# Patient Record
Sex: MALE | Race: ASIAN | NOT HISPANIC OR LATINO | URBAN - METROPOLITAN AREA
[De-identification: names, ages, dates, MRNs, and addresses within clinical notes are randomized per-mention and may not be internally consistent; named-entity substitution may affect disease eponyms.]

---

## 2018-07-05 ENCOUNTER — INPATIENT (INPATIENT)
Facility: HOSPITAL | Age: 48
LOS: 8 days | Discharge: HOME | DRG: 234 | End: 2018-07-14
Attending: THORACIC SURGERY (CARDIOTHORACIC VASCULAR SURGERY) | Admitting: HOSPITALIST
Payer: COMMERCIAL

## 2018-07-05 VITALS
SYSTOLIC BLOOD PRESSURE: 145 MMHG | OXYGEN SATURATION: 99 % | HEART RATE: 75 BPM | TEMPERATURE: 98 F | DIASTOLIC BLOOD PRESSURE: 94 MMHG | RESPIRATION RATE: 20 BRPM | HEIGHT: 64 IN | WEIGHT: 134.92 LBS

## 2018-07-05 DIAGNOSIS — E11.9 TYPE 2 DIABETES MELLITUS WITHOUT COMPLICATIONS: ICD-10-CM

## 2018-07-05 DIAGNOSIS — I20.0 UNSTABLE ANGINA: ICD-10-CM

## 2018-07-05 DIAGNOSIS — I10 ESSENTIAL (PRIMARY) HYPERTENSION: ICD-10-CM

## 2018-07-05 LAB
ALBUMIN SERPL ELPH-MCNC: 4 G/DL — SIGNIFICANT CHANGE UP (ref 3.3–5.2)
ALP SERPL-CCNC: 51 U/L — SIGNIFICANT CHANGE UP (ref 40–120)
ALT FLD-CCNC: 18 U/L — SIGNIFICANT CHANGE UP
ANION GAP SERPL CALC-SCNC: 14 MMOL/L — SIGNIFICANT CHANGE UP (ref 5–17)
APTT BLD: 27.7 SEC — SIGNIFICANT CHANGE UP (ref 27.5–37.4)
AST SERPL-CCNC: 21 U/L — SIGNIFICANT CHANGE UP
BASOPHILS # BLD AUTO: 0 K/UL — SIGNIFICANT CHANGE UP (ref 0–0.2)
BASOPHILS NFR BLD AUTO: 0.2 % — SIGNIFICANT CHANGE UP (ref 0–2)
BILIRUB SERPL-MCNC: 0.3 MG/DL — LOW (ref 0.4–2)
BUN SERPL-MCNC: 16 MG/DL — SIGNIFICANT CHANGE UP (ref 8–20)
CALCIUM SERPL-MCNC: 9.3 MG/DL — SIGNIFICANT CHANGE UP (ref 8.6–10.2)
CHLORIDE SERPL-SCNC: 102 MMOL/L — SIGNIFICANT CHANGE UP (ref 98–107)
CK MB CFR SERPL CALC: 3.9 NG/ML — SIGNIFICANT CHANGE UP (ref 0–6.7)
CK SERPL-CCNC: 422 U/L — HIGH (ref 30–200)
CO2 SERPL-SCNC: 25 MMOL/L — SIGNIFICANT CHANGE UP (ref 22–29)
CREAT SERPL-MCNC: 0.97 MG/DL — SIGNIFICANT CHANGE UP (ref 0.5–1.3)
EOSINOPHIL # BLD AUTO: 0.3 K/UL — SIGNIFICANT CHANGE UP (ref 0–0.5)
EOSINOPHIL NFR BLD AUTO: 6.2 % — HIGH (ref 0–5)
GLUCOSE SERPL-MCNC: 161 MG/DL — HIGH (ref 70–115)
HCT VFR BLD CALC: 38.2 % — LOW (ref 42–52)
HGB BLD-MCNC: 13.2 G/DL — LOW (ref 14–18)
INR BLD: 0.99 RATIO — SIGNIFICANT CHANGE UP (ref 0.88–1.16)
LYMPHOCYTES # BLD AUTO: 2.1 K/UL — SIGNIFICANT CHANGE UP (ref 1–4.8)
LYMPHOCYTES # BLD AUTO: 44.1 % — SIGNIFICANT CHANGE UP (ref 20–55)
MCHC RBC-ENTMCNC: 28.9 PG — SIGNIFICANT CHANGE UP (ref 27–31)
MCHC RBC-ENTMCNC: 34.6 G/DL — SIGNIFICANT CHANGE UP (ref 32–36)
MCV RBC AUTO: 83.6 FL — SIGNIFICANT CHANGE UP (ref 80–94)
MONOCYTES # BLD AUTO: 0.4 K/UL — SIGNIFICANT CHANGE UP (ref 0–0.8)
MONOCYTES NFR BLD AUTO: 8.5 % — SIGNIFICANT CHANGE UP (ref 3–10)
NEUTROPHILS # BLD AUTO: 2 K/UL — SIGNIFICANT CHANGE UP (ref 1.8–8)
NEUTROPHILS NFR BLD AUTO: 40.8 % — SIGNIFICANT CHANGE UP (ref 37–73)
NT-PROBNP SERPL-SCNC: 59 PG/ML — SIGNIFICANT CHANGE UP (ref 0–300)
PLATELET # BLD AUTO: 151 K/UL — SIGNIFICANT CHANGE UP (ref 150–400)
POTASSIUM SERPL-MCNC: 3.8 MMOL/L — SIGNIFICANT CHANGE UP (ref 3.5–5.3)
POTASSIUM SERPL-SCNC: 3.8 MMOL/L — SIGNIFICANT CHANGE UP (ref 3.5–5.3)
PROT SERPL-MCNC: 7 G/DL — SIGNIFICANT CHANGE UP (ref 6.6–8.7)
PROTHROM AB SERPL-ACNC: 10.9 SEC — SIGNIFICANT CHANGE UP (ref 9.8–12.7)
RBC # BLD: 4.57 M/UL — LOW (ref 4.6–6.2)
RBC # FLD: 13.5 % — SIGNIFICANT CHANGE UP (ref 11–15.6)
SODIUM SERPL-SCNC: 141 MMOL/L — SIGNIFICANT CHANGE UP (ref 135–145)
TROPONIN T SERPL-MCNC: 0.06 NG/ML — SIGNIFICANT CHANGE UP (ref 0–0.06)
WBC # BLD: 4.8 K/UL — SIGNIFICANT CHANGE UP (ref 4.8–10.8)
WBC # FLD AUTO: 4.8 K/UL — SIGNIFICANT CHANGE UP (ref 4.8–10.8)

## 2018-07-05 PROCEDURE — 71045 X-RAY EXAM CHEST 1 VIEW: CPT | Mod: 26

## 2018-07-05 PROCEDURE — 99223 1ST HOSP IP/OBS HIGH 75: CPT

## 2018-07-05 PROCEDURE — 93010 ELECTROCARDIOGRAM REPORT: CPT

## 2018-07-05 PROCEDURE — 99285 EMERGENCY DEPT VISIT HI MDM: CPT

## 2018-07-05 RX ORDER — LOSARTAN POTASSIUM 100 MG/1
50 TABLET, FILM COATED ORAL DAILY
Qty: 0 | Refills: 0 | Status: DISCONTINUED | OUTPATIENT
Start: 2018-07-05 | End: 2018-07-06

## 2018-07-05 RX ORDER — ATORVASTATIN CALCIUM 80 MG/1
40 TABLET, FILM COATED ORAL AT BEDTIME
Qty: 0 | Refills: 0 | Status: DISCONTINUED | OUTPATIENT
Start: 2018-07-05 | End: 2018-07-09

## 2018-07-05 RX ORDER — NITROGLYCERIN 6.5 MG
0.4 CAPSULE, EXTENDED RELEASE ORAL
Qty: 0 | Refills: 0 | Status: DISCONTINUED | OUTPATIENT
Start: 2018-07-05 | End: 2018-07-09

## 2018-07-05 RX ORDER — INSULIN LISPRO 100/ML
VIAL (ML) SUBCUTANEOUS
Qty: 0 | Refills: 0 | Status: DISCONTINUED | OUTPATIENT
Start: 2018-07-05 | End: 2018-07-09

## 2018-07-05 RX ORDER — DEXTROSE 50 % IN WATER 50 %
12.5 SYRINGE (ML) INTRAVENOUS ONCE
Qty: 0 | Refills: 0 | Status: DISCONTINUED | OUTPATIENT
Start: 2018-07-05 | End: 2018-07-09

## 2018-07-05 RX ORDER — GLUCAGON INJECTION, SOLUTION 0.5 MG/.1ML
1 INJECTION, SOLUTION SUBCUTANEOUS ONCE
Qty: 0 | Refills: 0 | Status: DISCONTINUED | OUTPATIENT
Start: 2018-07-05 | End: 2018-07-09

## 2018-07-05 RX ORDER — ASPIRIN/CALCIUM CARB/MAGNESIUM 324 MG
325 TABLET ORAL DAILY
Qty: 0 | Refills: 0 | Status: DISCONTINUED | OUTPATIENT
Start: 2018-07-05 | End: 2018-07-09

## 2018-07-05 RX ORDER — DEXTROSE 50 % IN WATER 50 %
25 SYRINGE (ML) INTRAVENOUS ONCE
Qty: 0 | Refills: 0 | Status: DISCONTINUED | OUTPATIENT
Start: 2018-07-05 | End: 2018-07-09

## 2018-07-05 RX ORDER — SODIUM CHLORIDE 9 MG/ML
1000 INJECTION, SOLUTION INTRAVENOUS
Qty: 0 | Refills: 0 | Status: DISCONTINUED | OUTPATIENT
Start: 2018-07-05 | End: 2018-07-09

## 2018-07-05 RX ORDER — SODIUM CHLORIDE 9 MG/ML
3 INJECTION INTRAMUSCULAR; INTRAVENOUS; SUBCUTANEOUS ONCE
Qty: 0 | Refills: 0 | Status: COMPLETED | OUTPATIENT
Start: 2018-07-05 | End: 2018-07-05

## 2018-07-05 RX ORDER — DEXTROSE 50 % IN WATER 50 %
15 SYRINGE (ML) INTRAVENOUS ONCE
Qty: 0 | Refills: 0 | Status: DISCONTINUED | OUTPATIENT
Start: 2018-07-05 | End: 2018-07-09

## 2018-07-05 RX ORDER — METOPROLOL TARTRATE 50 MG
25 TABLET ORAL
Qty: 0 | Refills: 0 | Status: DISCONTINUED | OUTPATIENT
Start: 2018-07-05 | End: 2018-07-09

## 2018-07-05 RX ORDER — SODIUM CHLORIDE 9 MG/ML
1000 INJECTION INTRAMUSCULAR; INTRAVENOUS; SUBCUTANEOUS
Qty: 0 | Refills: 0 | Status: COMPLETED | OUTPATIENT
Start: 2018-07-06 | End: 2018-07-06

## 2018-07-05 RX ADMIN — SODIUM CHLORIDE 3 MILLILITER(S): 9 INJECTION INTRAMUSCULAR; INTRAVENOUS; SUBCUTANEOUS at 21:48

## 2018-07-05 NOTE — CONSULT NOTE ADULT - PROBLEM SELECTOR RECOMMENDATION 9
Admit to monitored setting, check CBC, BMP, trend C@ x2, TFTs, A1C and FLP in am  ECG in am with CXR, O2 NC 2L  Continue ASA daily, with Statin therapy  Keep NPO after MN for Cath in am in light of acute ECG changes and ongoing chest pain,   case d/w Dr. Solis Admit to monitored setting, check CBC, BMP, trend C@ x2, TFTs, A1C and FLP in am  ECG in am with CXR, O2 NC 2L  Continue ASA daily, with Statin therapy  Keep NPO after MN for Cath in am in light of acute ECG changes and ongoing exertional chest pain,   case d/w Dr. Solis

## 2018-07-05 NOTE — ED PROVIDER NOTE - MEDICAL DECISION MAKING DETAILS
A 48 year old male pt presents to the ED c/o CP. Will check labs, EKG, cardiac enzymes, admit pt for cardiac evaluation in the am.

## 2018-07-05 NOTE — ED PROVIDER NOTE - OBJECTIVE STATEMENT
A 48 year old male pt with a hx of HTN, IDDM, on Insulin and Metformin, presents to the ED c/o CP, SOB. Over the past month the pt has been experiencing intermittent episodes of CP. Yesterday, the pt was playing "[employer] when he had a sudden onset of achy, non radiating substernal CP and SOB. The pt states that the episode lasted approx. 1 minute before dissipating. He was seen by his PMD Dr. Maxwell today where had an EKG done. Pt was told that his EKG had changes from a prior EKG 1 year ago and was told to come to [facility] to be evaluated by Dr. Caldera. He denies any current CP and has not had any associated N/V/D, recent fevers or chills. No further complaints at this time.

## 2018-07-05 NOTE — H&P ADULT - FAMILY HISTORY
Father  Still living? Unknown  Family history of cerebrovascular accident (CVA), Age at diagnosis: Age Unknown     Mother  Still living? Unknown  Family history of cerebrovascular accident (CVA), Age at diagnosis: Age Unknown

## 2018-07-05 NOTE — CONSULT NOTE ADULT - PROBLEM SELECTOR RECOMMENDATION 3
Check A1C, Insulin sliding scale, hold Metformin for cardiac procedure  Low carb diet once able to tolerate orals  Patient education provided for need of strict dietary adherence, weight goals and daily exercise, patient aware and understands

## 2018-07-05 NOTE — H&P ADULT - NSHPPHYSICALEXAM_GEN_ALL_CORE
Vital Signs   T(C): 36.6 (05 Jul 2018 18:07), Max: 36.6 (05 Jul 2018 18:07)  T(F): 97.9 (05 Jul 2018 18:07), Max: 97.9 (05 Jul 2018 18:07)  HR: 75 (05 Jul 2018 18:07) (75 - 75)  BP: 145/94 (05 Jul 2018 18:07) (145/94 - 145/94)  RR: 20 (05 Jul 2018 18:07) (20 - 20)  SpO2: 99% (05 Jul 2018 18:07) (99% - 99%)  General: Well developed. Well nourished. No acute distress  HEENT: PERRLA. EOMI. Clear conjunctivae. Moist mucus membrane  Neck: Supple. No JVD. No Thyromegaly   Chest: CTA bilaterally - no wheezing, rales or rhonchi. No chest wall tenderness.  Heart: Normal S1 & S2 with RRR. No murmur.   Abdomen: Soft. Non-tender. Non-distended. + BS  Ext: No pedal edema. No calf tenderness   Neuro: AAO x 3. No focal deficit. CN II-XII grossly WNL.  No speech disorder.  Skin: Warm and Dry  Psychiatry: Normal mood and affect

## 2018-07-05 NOTE — H&P ADULT - ASSESSMENT
48 years old male with PMH of DM, HTN and HLD comes with Chest Pain. As per patient, he has intermittent retrosternal chest pain for a month. It is mostly on exertion - dull and achy, non radiating, relieves with rest and is associated with shortness of breath and dizziness. Today, he went to his PMD for routine follow up and was found to have new EKG changes so he was sent to ER for further evaluation.  Denies fever, cough, palpitations, nausea, vomiting, diaphoresis, sick contacts or recent travel.    1) Chest Pain rule out ACS  - Cardiac Enzymes and ECHO  - HbA1c 7.2 on 7/3/18  - Lipid Profile (T. Chol 157; HDL 32; LDL 83 and ) on 7/3/18  - Aspirin 325 mg daily  - Lipitor 40 mg at bedtime  - Metoprolol 25 mg twice a day  - Continue Losartan 50 mg daily  - Cardiology Consult appreciated. NPO after midnight for cath in am.   2) DM  - HbA1c 7.2 on 7/3/18  - Accu checks, RISS and Lantus  3) HTN  - Losartan 50 mg daily  - Metoprolol 25 mg twice daily  4) HLD  - Lipid Profile (T. Chol 157; HDL 32; LDL 83 and ) on 7/3/18  - Lipitor 40 mg at bedtime  5) Anemia  - Outpatient work up  DVT Prophylaxis -- Lovenox 40 mg

## 2018-07-05 NOTE — H&P ADULT - NSHPLABSRESULTS_GEN_ALL_CORE
LABS:                        13.2   4.8   )-----------( 151      ( 05 Jul 2018 21:48 )             38.2     07-05    141  |  102  |  16.0  ----------------------------<  161<H>  3.8   |  25.0  |  0.97    Ca    9.3      05 Jul 2018 21:48    TPro  7.0  /  Alb  4.0  /  TBili  0.3<L>  /  DBili  x   /  AST  21  /  ALT  18  /  AlkPhos  51  07-05    PT/INR - ( 05 Jul 2018 21:48 )   PT: 10.9 sec;   INR: 0.99 ratio         PTT - ( 05 Jul 2018 21:48 )  PTT:27.7 sec  CARDIAC MARKERS ( 05 Jul 2018 21:48 )  x     / 0.06 ng/mL / 422 U/L / x     / 3.9 ng/mL    EKG: NSR at 74 bpm. Left axis deviation. TWI in lateral leads.

## 2018-07-05 NOTE — ED PROVIDER NOTE - PROGRESS NOTE DETAILS
Case d/w Cardio/Dr. Solis and Cardio PA will eval pt in ED and pt will most likely have cardiac cath tomorrow.  Case d/w Hospitalist/Dr. Augustine and will admit to Tele

## 2018-07-05 NOTE — CONSULT NOTE ADULT - SUBJECTIVE AND OBJECTIVE BOX
[facility]                                                        Office: [address] [address]                                                       Telephone: [phone]. Fax:[phone]                                                              CARDIOLOGY CONSULTATION NOTE                                                                                                   Chief complaint:  Male who presents with a chief complaint of chest pain     HPI: Patient is a 47 yo M sent in from PCPs office after experiencing intermittent left sided chest pain and CABALLERO.  About a month ago patient noticed that he was having chest pain "like heaviness in the middle" while doing physical activity.  Describes pain as 7/10 in intensity, dull and tightness is experienced without radiation.  For the past month he noticed that his symptoms are becaming worse; and yesterday while playing an outdoor game he again developed chest pressure along with palpitations and had to stop the activity due to the severity of his symptoms.  Went to see his PCP who performed an ECG and found that patient developed new changes (inverted Ts) from last year.  Patient sent in for further evaluation and management.  Currently, sitting up in bed and denies fever, chills, CP, palpitations, N/V, diaphoresis, leg edema or weakness. Family h/o CAD, CVA and early death.            REVIEW OF SYMPTOMS:   General: No distress presently  Cardiovascular: + chest pain, + dyspnea,  + palpitations, denies dizziness, orthopnea, Paroxsymal nocturnal dyspnea  Respiratory: + dyspnea, denies fever, cough, wheezing or PNA     Gastrointestinal: denies nausea, vomiting, diarrhea, abdominal pain  Neurological: denies headache, dizziness, or slurred speech;  Psychiatric: No agitation, no anxiety.  ALL OTHER REVIEW OF SYSTEMS ARE NEGATIVE.    ALLERGIES: No Known Allergies    HOME MEDICATIONS:  Insulin Lantus 16 Units SQ QHS  Hyzaar 50-12.5mg oral daily  Metformin 750 mg oral BID  Pravachol 20 mg oral QHS  ASA 81 mg oral daily     PAST MEDICAL HISTORY  DM T2  Essential HTN    PAST SURGICAL HISTORY      FAMILY HISTORY: Dad  at age 55--> CVA   Mom 75 alive -->CVA      SOCIAL HISTORY:  Denies smoking/alcohol/drugs    Vital Signs Last 24 Hrs  T(C): 36.6 (2018 18:07), Max: 36.6 (2018 18:07)  T(F): 97.9 (2018 18:07), Max: 97.9 (2018 18:07)  HR: 75 (2018 18:07) (75 - 75)  BP: 145/94 (2018 18:07) (145/94 - 145/94)  BP(mean): --  RR: 20 (2018 18:07) (20 - 20)  SpO2: 99% (2018 18:07) (99% - 99%)      PHYSICAL EXAM:  Constitutional: Comfortable and no acute distress   HEENT: Atraumatic, neck supple no JVD and no carotid bruit   CNS: A&Ox3, No focal deficits, PERRLA and EOMI Cranial nerves II-IX are intact   Respiratory: CTA b/l no wheezing noted  Cardiovascular: S1S2 RRR No murmur/rubs   Gastrointestinal: Soft non-tender and non distended with +Bowel sounds  Extremities: No edema noted  Psychiatric: Calm and no agitation  Skin: No skin rash/ulcers visualized to face, hands or feet.    LABS:                        13.2   4.8   )-----------( 151      ( 2018 21:48 )             38.2     141  |  102  |  16.0  ----------------------------<  161<H>  3.8   |  25.0  |  0.97    Ca    9.3      2018 21:48    TPro  7.0  /  Alb  4.0  /  TBili  0.3<L>  /  DBili  x   /  AST  21  /  ALT  18  /  AlkPhos  51  07-05    CARDIAC MARKERS ( 2018 21:48 )  x     / 0.06 ng/mL / 422 U/L / x     / x        ;p-BNP=Serum Pro-Brain Natriuretic Peptide: 59 pg/mL ( @ 21:48)    ECG: NSR @ 74 bpm with inverted T's in V4-V6, I, aVL (new)      RADIOLOGY & ADDITIONAL STUDIES:   X-ray:  Clear       ECHO FINDINGS: Date:                : LVEF=          ; RV function:       ; Valvular abnormalities: No significant valvular abnormality.  Mitral valve:           ;  Aortic valve:              ;Tricuspid valve:         ; Pulmonary pressures:        mm Hg. Pericardium:      STRESS  FINDINGS: Date:            ;      CATHETERIZATION FINDINGS:  Date:              :  LAD:                       ;  LCx:                        ; RCA:                ;

## 2018-07-05 NOTE — ED ADULT TRIAGE NOTE - CHIEF COMPLAINT QUOTE
Patient sent to the ED from urgent care, patient has been having intermittent CP and SOB with exertion x 1 month.

## 2018-07-06 ENCOUNTER — TRANSCRIPTION ENCOUNTER (OUTPATIENT)
Age: 48
End: 2018-07-06

## 2018-07-06 DIAGNOSIS — I25.118 ATHEROSCLEROTIC HEART DISEASE OF NATIVE CORONARY ARTERY WITH OTHER FORMS OF ANGINA PECTORIS: ICD-10-CM

## 2018-07-06 DIAGNOSIS — I24.9 ACUTE ISCHEMIC HEART DISEASE, UNSPECIFIED: ICD-10-CM

## 2018-07-06 LAB
CK MB CFR SERPL CALC: 3.1 NG/ML — SIGNIFICANT CHANGE UP (ref 0–6.7)
CK SERPL-CCNC: 212 U/L — HIGH (ref 30–200)
GLUCOSE BLDC GLUCOMTR-MCNC: 143 MG/DL — HIGH (ref 70–99)
GLUCOSE BLDC GLUCOMTR-MCNC: 166 MG/DL — HIGH (ref 70–99)
GLUCOSE BLDC GLUCOMTR-MCNC: 170 MG/DL — HIGH (ref 70–99)
TROPONIN T SERPL-MCNC: 0.05 NG/ML — SIGNIFICANT CHANGE UP (ref 0–0.06)
TSH SERPL-MCNC: 1.65 UIU/ML — SIGNIFICANT CHANGE UP (ref 0.27–4.2)

## 2018-07-06 PROCEDURE — 99152 MOD SED SAME PHYS/QHP 5/>YRS: CPT

## 2018-07-06 PROCEDURE — 99223 1ST HOSP IP/OBS HIGH 75: CPT

## 2018-07-06 PROCEDURE — 99232 SBSQ HOSP IP/OBS MODERATE 35: CPT

## 2018-07-06 PROCEDURE — 93458 L HRT ARTERY/VENTRICLE ANGIO: CPT | Mod: 26

## 2018-07-06 PROCEDURE — 99233 SBSQ HOSP IP/OBS HIGH 50: CPT

## 2018-07-06 RX ORDER — SODIUM CHLORIDE 9 MG/ML
1000 INJECTION INTRAMUSCULAR; INTRAVENOUS; SUBCUTANEOUS
Qty: 0 | Refills: 0 | Status: DISCONTINUED | OUTPATIENT
Start: 2018-07-06 | End: 2018-07-06

## 2018-07-06 RX ORDER — CHLORHEXIDINE GLUCONATE 213 G/1000ML
15 SOLUTION TOPICAL
Qty: 0 | Refills: 0 | Status: DISCONTINUED | OUTPATIENT
Start: 2018-07-06 | End: 2018-07-09

## 2018-07-06 RX ORDER — ENOXAPARIN SODIUM 100 MG/ML
40 INJECTION SUBCUTANEOUS DAILY
Qty: 0 | Refills: 0 | Status: DISCONTINUED | OUTPATIENT
Start: 2018-07-06 | End: 2018-07-09

## 2018-07-06 RX ORDER — PANTOPRAZOLE SODIUM 20 MG/1
40 TABLET, DELAYED RELEASE ORAL
Qty: 0 | Refills: 0 | Status: DISCONTINUED | OUTPATIENT
Start: 2018-07-06 | End: 2018-07-09

## 2018-07-06 RX ORDER — CHLORHEXIDINE GLUCONATE 213 G/1000ML
1 SOLUTION TOPICAL
Qty: 0 | Refills: 0 | Status: DISCONTINUED | OUTPATIENT
Start: 2018-07-07 | End: 2018-07-09

## 2018-07-06 RX ORDER — SODIUM CHLORIDE 9 MG/ML
200 INJECTION INTRAMUSCULAR; INTRAVENOUS; SUBCUTANEOUS
Qty: 0 | Refills: 0 | Status: DISCONTINUED | OUTPATIENT
Start: 2018-07-06 | End: 2018-07-09

## 2018-07-06 RX ORDER — INSULIN GLARGINE 100 [IU]/ML
16 INJECTION, SOLUTION SUBCUTANEOUS AT BEDTIME
Qty: 0 | Refills: 0 | Status: DISCONTINUED | OUTPATIENT
Start: 2018-07-06 | End: 2018-07-08

## 2018-07-06 RX ORDER — HYDRALAZINE HCL 50 MG
25 TABLET ORAL
Qty: 0 | Refills: 0 | Status: DISCONTINUED | OUTPATIENT
Start: 2018-07-06 | End: 2018-07-09

## 2018-07-06 RX ORDER — POTASSIUM CHLORIDE 20 MEQ
40 PACKET (EA) ORAL ONCE
Qty: 0 | Refills: 0 | Status: COMPLETED | OUTPATIENT
Start: 2018-07-06 | End: 2018-07-06

## 2018-07-06 RX ADMIN — Medication 325 MILLIGRAM(S): at 01:24

## 2018-07-06 RX ADMIN — Medication 25 MILLIGRAM(S): at 05:28

## 2018-07-06 RX ADMIN — ENOXAPARIN SODIUM 40 MILLIGRAM(S): 100 INJECTION SUBCUTANEOUS at 21:49

## 2018-07-06 RX ADMIN — Medication 1: at 21:48

## 2018-07-06 RX ADMIN — INSULIN GLARGINE 16 UNIT(S): 100 INJECTION, SOLUTION SUBCUTANEOUS at 21:49

## 2018-07-06 RX ADMIN — Medication 40 MILLIEQUIVALENT(S): at 16:13

## 2018-07-06 RX ADMIN — Medication 25 MILLIGRAM(S): at 18:06

## 2018-07-06 RX ADMIN — SODIUM CHLORIDE 50 MILLILITER(S): 9 INJECTION INTRAMUSCULAR; INTRAVENOUS; SUBCUTANEOUS at 17:24

## 2018-07-06 RX ADMIN — LOSARTAN POTASSIUM 50 MILLIGRAM(S): 100 TABLET, FILM COATED ORAL at 05:28

## 2018-07-06 RX ADMIN — ATORVASTATIN CALCIUM 40 MILLIGRAM(S): 80 TABLET, FILM COATED ORAL at 21:48

## 2018-07-06 RX ADMIN — Medication 325 MILLIGRAM(S): at 12:20

## 2018-07-06 RX ADMIN — SODIUM CHLORIDE 125 MILLILITER(S): 9 INJECTION INTRAMUSCULAR; INTRAVENOUS; SUBCUTANEOUS at 12:22

## 2018-07-06 NOTE — DISCHARGE NOTE ADULT - CARE PLAN
Principal Discharge DX:	Coronary artery disease of native artery of native heart with stable angina pectoris  Goal:	rest and recover  Assessment and plan of treatment:	Please come to ED or Call Cardio thoracic office at [phone] if Chest pain, Shortness of Breath,  Nausea & vomiting, oozing from wounds, 2 lb increase in weight in 24 hours.  No Ointments creams lotions to wounds  Shower daily, no bathing swimming in a pool or the ocean until instructed by MD.  We advise that you do not drive until instructed by MD. You may not return to work until instructed by MD. Please eat a low fat low salt diet. Weigh yourself daily and record it in the weight log in your red folder.

## 2018-07-06 NOTE — DISCHARGE NOTE ADULT - CARE PROVIDERS DIRECT ADDRESSES
[email or web address]

## 2018-07-06 NOTE — DISCHARGE NOTE ADULT - PLAN OF CARE
Please come to ED or Call Cardio thoracic office at [phone] if Chest pain, Shortness of Breath,  Nausea & vomiting, oozing from wounds, 2 lb increase in weight in 24 hours.  No Ointments creams lotions to wounds  Shower daily, no bathing swimming in a pool or the ocean until instructed by MD.  We advise that you do not drive until instructed by MD. You may not return to work until instructed by MD. Please eat a low fat low salt diet. Weigh yourself daily and record it in the weight log in your red folder. rest and recover

## 2018-07-06 NOTE — DISCHARGE NOTE ADULT - PATIENT PORTAL LINK FT
You can access the [facility] Patient Portal, offered by [facility], by registering with the following website: [email or web address]

## 2018-07-06 NOTE — DISCHARGE NOTE ADULT - MEDICATION SUMMARY - MEDICATIONS TO STOP TAKING
I will STOP taking the medications listed below when I get home from the hospital:    Hyzaar 50 mg-12.5 mg oral tablet  -- 1 tab(s) by mouth once a day

## 2018-07-06 NOTE — PROGRESS NOTE ADULT - SUBJECTIVE AND OBJECTIVE BOX
s/p LHC RRA  Severe and diffuse obstructive CAD  Centricity pending  Dr. Solis to discuss options with patient  Patient tolerated procedure well w/o complications          REVIEW OF SYSTEMS:  Denies SOB, CP, NV, HA, dizziness, palpitations, site pain    PHYSICAL EXAM: A&Ox3 NAD Skin warm and dry  NEURO: Speech intact +gag +swallow Tongue midline CRUZ  NECK: No JVD, trachea midline. Eupneic  HEART: RRR S12 no gm Tele w/o ectopy  PULMONARY:  CTA merlene  ABDOMEN: Soft nontender X4 +BS Vdg/eating  EXTREMITIES: Rt Radial site: Rt radial pulse + w/pulse ox on right index finger SaO2>95% RUE w/oneurovascular deficit. Capillary refill <3 sec

## 2018-07-06 NOTE — DISCHARGE NOTE ADULT - NS AS ACTIVITY OBS
Do not make important decisions/Do not drive or operate machinery/Showering allowed/No Heavy lifting/straining Walking-Outdoors allowed/Stairs allowed/Do not make important decisions/Do not drive or operate machinery/Showering allowed/Walking-Indoors allowed/No Heavy lifting/straining

## 2018-07-06 NOTE — CONSULT NOTE ADULT - ATTENDING COMMENTS
Above H& P reviewed and independently verified. Multivessel diffuse CAD. He has DM  and HT.    Extensively discussed the need for CABG and all questions answered. He will reach out to me if he has further questions.
Pt is seen and examined. Records reviewed.  Agre with H&P.  d/w Dr. Solis.  [facility] today.

## 2018-07-06 NOTE — DISCHARGE NOTE ADULT - ADDITIONAL INSTRUCTIONS
Follow up appointment with Dr Muhammad  Cardiac surgery office second floor at [facility]   Please follow up with your Cardiologist and Primary care Doctor 4 weeks from discharge Follow up appointment with Dr Muhammad *** Please call office Monday at [phone]  to arrange  Cardiac surgery office second floor at [facility]   Please follow up with your Cardiologist and Primary care Doctor 4 weeks from discharge

## 2018-07-06 NOTE — DISCHARGE NOTE ADULT - MEDICATION SUMMARY - MEDICATIONS TO TAKE
I will START or STAY ON the medications listed below when I get home from the hospital:    aspirin 325 mg oral delayed release tablet  -- 1 tab(s) by mouth once a day  -- Indication: For Aspirin    oxyCODONE-acetaminophen 5 mg-325 mg oral tablet  -- 1 tab(s) by mouth every 4 hours, As needed, Moderate Pain (4 - 6) MDD:4 tablets  -- Indication: For Pain    metFORMIN 1000 mg oral tablet  -- 1 tab(s) by mouth 2 times a day  -- Indication: For Dm    Lantus 100 units/mL subcutaneous solution  -- 20 unit(s) subcutaneous once a day (at bedtime)   -- Indication: For Dm    Pravachol 20 mg oral tablet  -- 1 tab(s) by mouth once a day  -- Indication: For Statin    clopidogrel 75 mg oral tablet  -- 1 tab(s) by mouth once a day  -- Indication: For Antiplatelet    atenolol 50 mg oral tablet  -- 1 tab(s) by mouth 2 times a day  -- Indication: For betablocker    ferrous sulfate 325 mg (65 mg elemental iron) oral tablet  -- orally 2 times a day  -- Indication: For Supplement  OTC    docusate sodium 100 mg oral capsule  -- 1 cap(s) by mouth 3 times a day  -- Indication: For OTC stool softener    polyethylene glycol 3350 oral powder for reconstitution  -- 17 gram(s) by mouth once a day  -- Indication: For OTC stool softener    senna oral tablet  -- 2 tab(s) by mouth once a day (at bedtime)  -- Indication: For OTC    pantoprazole 40 mg oral delayed release tablet  -- 1 tab(s) by mouth once a day  -- Indication: For GERD    ascorbic acid 500 mg oral tablet  -- 1 tab(s) by mouth 3 times a day  -- Indication: For Supplement    folic acid 0.8 mg oral tablet  -- 1 tab(s) by mouth once a day  -- Indication: For OTC supplement

## 2018-07-06 NOTE — DISCHARGE NOTE ADULT - MEDICATION SUMMARY - MEDICATIONS TO CHANGE
I will SWITCH the dose or number of times a day I take the medications listed below when I get home from the hospital:    Lantus 100 units/mL subcutaneous solution  -- 16 unit(s) subcutaneous once a day (at bedtime)    metFORMIN 750 mg oral tablet, extended release  -- 1 tab(s) by mouth 2 times a day    aspirin 81 mg oral tablet  -- 1 tab(s) by mouth once a day

## 2018-07-06 NOTE — DISCHARGE NOTE ADULT - HOSPITAL COURSE
s/p LHC RRA 7/9 C4L (om, diag, lad, rpda)  7/5 ER c/o exertional CP/dizziness x 1 month. 7/6 cath> Diffuse, severe obstructive CAD Patient is a 48 year old male with significant past medical history of DMII A1C 7.8%, HTN, HLD,   Inpatient History:  7/9/18- Four vessel CABG with Dr. Muhammad (SVG-OM, SVG-Diag, SVG- RPDA, LIMA-LAD), extubated without issue, HD stable, lactate down trending

## 2018-07-06 NOTE — CONSULT NOTE ADULT - SUBJECTIVE AND OBJECTIVE BOX
Surgeon: Jb    Consult requesting by: Irma    HISTORY OF PRESENT ILLNESS:  48y Male with PMH DM (On insulin), HTN,  and HLD.  Presented to the ER yesterday from PCP office c/o exertional CP and dizziness x 1 month.  Underwent cardiac cath today which revealed severe and diffuse obstructive CAD (offical report pending).  Currently sitting on the stretcher.  Wife and children at bedside.  Denies SOB or CP.  NAD noted.     PAST MEDICAL & SURGICAL HISTORY:  HLD (hyperlipidemia)  HTN (hypertension)  IDDM (insulin dependent diabetes mellitus)  No significant past surgical history      MEDICATIONS  (STANDING):  aspirin 325 milliGRAM(s) Oral daily  atorvastatin 40 milliGRAM(s) Oral at bedtime  dextrose 5%. 1000 milliLiter(s) (50 mL/Hr) IV Continuous <Continuous>  dextrose 50% Injectable 12.5 Gram(s) IV Push once  dextrose 50% Injectable 25 Gram(s) IV Push once  dextrose 50% Injectable 25 Gram(s) IV Push once  enoxaparin Injectable 40 milliGRAM(s) SubCutaneous daily  hydrALAZINE 25 milliGRAM(s) Oral two times a day  insulin glargine Injectable (LANTUS) 16 Unit(s) SubCutaneous at bedtime  insulin lispro (HumaLOG) corrective regimen sliding scale   SubCutaneous Before meals and at bedtime  metoprolol tartrate 25 milliGRAM(s) Oral two times a day  pantoprazole    Tablet 40 milliGRAM(s) Oral before breakfast  sodium chloride 0.9%. 200 milliLiter(s) (50 mL/Hr) IV Continuous <Continuous>    MEDICATIONS  (PRN):  dextrose 40% Gel 15 Gram(s) Oral once PRN Blood Glucose LESS THAN 70 milliGRAM(s)/deciLiter  glucagon  Injectable 1 milliGRAM(s) IntraMuscular once PRN Glucose <70 milliGRAM(s)/deciLiter  nitroglycerin     SubLingual 0.4 milliGRAM(s) SubLingual every 5 minutes PRN Chest Pain    Antiplatelet therapy:   ASA 81mg last dose today.                        Last dose/amt:    Allergies    No Known Allergies    Intolerances        SOCIAL HISTORY:  Smoker: [ ] Yes  [x ] No        PACK YEARS:                         WHEN QUIT?  ETOH use: [ ] Yes  [ x] No              FREQUENCY / QUANTITY:  Ilicit Drug use:  [ ] Yes  [ x] No  Occupation:  for a company.  Live with: Wife and children  Assisted device use: denies use    FAMILY HISTORY:  Family history of cerebrovascular accident (CVA) (Father, Mother)      Review of Systems  CONSTITUTIONAL:  Fevers[ ] chills[ ] sweats[ ] fatigue[ ] weight loss[ ] weight gain [ ]                                     NEGATIVE [x ]   NEURO:  parathesias[ ] seizures [ ]  syncope [ ]  confusion [ ]        dizziness with the CP episodes                        NEGATIVE[ ]   EYES: glasses[ ]  blurry vision[ ]  discharge[ ] pain[ ] glaucoma [ ]                                                                          NEGATIVE[ x]   ENMT:  difficulty hearing [ ]  vertigo[ ]  dysphagia[ ] epistaxis[ ] recent dental work [ ]                                    NEGATIVE[x ]   CV:  chest pain[x ] palpitations[ ] CABALLERO [ ] diaphoresis [ ]                                                                                           NEGATIVE[ ]   RESPIRATORY:  wheezing[ ] SOB[ ] cough [ ] sputum[ ] hemoptysis[ ]                                                                  NEGATIVE[x ]   GI:  nausea[ ]  vommiting [ ]  diarrhea[ ] constipation [ ] melena [ ]                                                                      NEGATIVE[x ]   : hematuria[ ]  dysuria[ ] urgency[ ] incontinence[ ]                                                                                            NEGATIVE[x ]   MUSKULOSKELETAL:  arthritis[ ]  joint swelling [ ] muscle weakness [ ]                                                                NEGATIVE[x ]   SKIN/BREAST:  rash[ ] itching [ ]  hair loss[ ] masses[ ]                                                                                              NEGATIVE[x ]   PSYCH:  dementia [ ] depresion [ ] anxiety[ ]                                                                                                               NEGATIVE[x ]   HEME/LYMPH:  bruises easily[ ] enlarged lymph nodes[ ] tender lymph nodes[ ]                                               NEGATIVE[x ]   ENDOCRINE:  cold intolerance[ ] heat intolerance[ ] polydipsia[ ]                                                                          NEGATIVE[x ]     PHYSICAL EXAM  Vital Signs Last 24 Hrs  T(C): 36.4 (06 Jul 2018 15:35), Max: 36.7 (06 Jul 2018 04:16)  T(F): 97.6 (06 Jul 2018 15:35), Max: 98.1 (06 Jul 2018 04:16)  HR: 77 (06 Jul 2018 19:00) (66 - 88)  BP: 159/80 (06 Jul 2018 19:00) (114/72 - 159/99)  BP(mean): --  RR: 18 (06 Jul 2018 19:00) (10 - 19)  SpO2: 100% (06 Jul 2018 19:00) (96% - 100%) on room air at rest.    CONSTITUTIONAL:                                                                          WNL[ x]   Neuro: WNL[x ] Normal exam oriented to person/place & time with no focal motor or sensory  deficits. Other                     Eyes: WNL[ x]   Normal exam of conjunctiva & lids, pupils equally reactive. Other     ENT: WNL[x ]    Normal exam of nasal/oral mucosa with absence of cyanosis. Other  Neck: WNL[x ]  Normal exam of jugular veins, trachea & thyroid. Other  Chest: WNL[x ] Normal lung exam with good air movement absence of wheezes, rales, or rhonchi: Other                                                                                CV:  Auscultation: normal [x ] S3[ ] S4[ ] Irregular [ ] Rub[ ] Clicks[ ]    Murmurs none:[x ]systolic [ ]  diastolic [ ] holosystolic [ ]  Carotids: No Bruits[x ] Other                 Abdominal Aorta: normal [x ] nonpalpable[ ]Other                                                                                      GI:           WNL[x ] Normal exam of abdomen, liver & spleen with no noted masses or tenderness. Other                                                                                                        Extremities: WNL[x ] Normal no evidence of cyanosis or deformity Edema: none[ ]trace[ ]1+[ ]2+[ ]3+[ ]4+[ ]  Lower Extremity Pulses: Right[ pp] Left[pp ]Varicosities[ -]  SKIN :WNL[ x] Normal exam to inspection & palpation. Other:                                                          LABS:                        13.2   4.8   )-----------( 151      ( 05 Jul 2018 21:48 )             38.2     07-05    141  |  102  |  16.0  ----------------------------<  161<H>  3.8   |  25.0  |  0.97    Ca    9.3      05 Jul 2018 21:48    TPro  7.0  /  Alb  4.0  /  TBili  0.3<L>  /  DBili  x   /  AST  21  /  ALT  18  /  AlkPhos  51  07-05    PT/INR - ( 05 Jul 2018 21:48 )   PT: 10.9 sec;   INR: 0.99 ratio         PTT - ( 05 Jul 2018 21:48 )  PTT:27.7 sec    CARDIAC MARKERS ( 06 Jul 2018 08:15 )  x     / 0.05 ng/mL / 212 U/L / x     / 3.1 ng/mL  CARDIAC MARKERS ( 05 Jul 2018 21:48 )  x     / 0.06 ng/mL / 422 U/L / x     / 3.9 ng/mL          Cardiac Cath: Awaiting official report    TTE / NITISH: pending.

## 2018-07-06 NOTE — PROGRESS NOTE ADULT - SUBJECTIVE AND OBJECTIVE BOX
Patient: SIMRAN VELASCO [id number] 48y Male                           Internal Medicine Hospitalist Progress Note  Chief Complaint: Patient is a 48y old  Male who presents with a chief complaint of Chest Pain (05 Jul 2018 23:34)    HPI:  48 years old male with PMH of DM, HTN and HLD comes with Chest Pain. As per patient, he has intermittent retrosternal chest pain for a month. It is mostly on exertion - dull and achy, non radiating, relieves with rest and is associated with shortness of breath and dizziness. Today, he went to his PMD for routine follow up and was found to have new EKG changes so he was sent to ER for further evaluation.  Denies fever, cough, palpitations, nausea, vomiting, diaphoresis, sick contacts or recent travel. (05 Jul 2018 23:34)    Interim History:   Pt seen with wife at bedside.  Chest pain resolved.  No sob.  No additional complaints.     ____________________PHYSICAL EXAM:  Vitals reviewed as indicated below  GENERAL:  NAD Alert and Oriented x 3   HEENT: NCAT  CARDIOVASCULAR:  S1, S2  LUNGS: CTAB  ABDOMEN:  soft, (-) tenderness, (-) distension, (+) bowel sounds, (-) guarding, (-) rebound (-) rigidity  EXTREMITIES:  no cyanosis / clubbing / edema.   ____________________      BACKGROUND:  HEALTH ISSUES - PROBLEM Dx:  Diabetes mellitus type 2, insulin dependent: Diabetes mellitus type 2, insulin dependent  Essential hypertension: Essential hypertension  Unstable angina pectoris: Unstable angina pectoris        Allergies    No Known Allergies    Intolerances      PAST MEDICAL & SURGICAL HISTORY:  HLD (hyperlipidemia)  HTN (hypertension)  IDDM (insulin dependent diabetes mellitus)  No significant past surgical history        VITALS:  Vital Signs Last 24 Hrs  T(C): 36.7 (06 Jul 2018 11:27), Max: 36.7 (06 Jul 2018 04:16)  T(F): 98.1 (06 Jul 2018 11:27), Max: 98.1 (06 Jul 2018 04:16)  HR: 66 (06 Jul 2018 11:27) (66 - 77)  BP: 159/99 (06 Jul 2018 11:27) (132/86 - 159/99)  BP(mean): --  RR: 16 (06 Jul 2018 11:27) (16 - 20)  SpO2: 99% (06 Jul 2018 11:27) (98% - 99%) Daily Height in cm: 162.56 (05 Jul 2018 18:07)    Daily   CAPILLARY BLOOD GLUCOSE      POCT Blood Glucose.: 143 mg/dL (06 Jul 2018 08:13)    I&O's Summary        LABS:                        13.2   4.8   )-----------( 151      ( 05 Jul 2018 21:48 )             38.2     07-05    141  |  102  |  16.0  ----------------------------<  161<H>  3.8   |  25.0  |  0.97    Ca    9.3      05 Jul 2018 21:48    TPro  7.0  /  Alb  4.0  /  TBili  0.3<L>  /  DBili  x   /  AST  21  /  ALT  18  /  AlkPhos  51  07-05    PT/INR - ( 05 Jul 2018 21:48 )   PT: 10.9 sec;   INR: 0.99 ratio         PTT - ( 05 Jul 2018 21:48 )  PTT:27.7 sec  LIVER FUNCTIONS - ( 05 Jul 2018 21:48 )  Alb: 4.0 g/dL / Pro: 7.0 g/dL / ALK PHOS: 51 U/L / ALT: 18 U/L / AST: 21 U/L / GGT: x             CARDIAC MARKERS ( 06 Jul 2018 08:15 )  x     / 0.05 ng/mL / 212 U/L / x     / 3.1 ng/mL  CARDIAC MARKERS ( 05 Jul 2018 21:48 )  x     / 0.06 ng/mL / 422 U/L / x     / 3.9 ng/mL          MEDICATIONS:  MEDICATIONS  (STANDING):  aspirin 325 milliGRAM(s) Oral daily  atorvastatin 40 milliGRAM(s) Oral at bedtime  dextrose 5%. 1000 milliLiter(s) (50 mL/Hr) IV Continuous <Continuous>  dextrose 50% Injectable 12.5 Gram(s) IV Push once  dextrose 50% Injectable 25 Gram(s) IV Push once  dextrose 50% Injectable 25 Gram(s) IV Push once  enoxaparin Injectable 40 milliGRAM(s) SubCutaneous daily  insulin glargine Injectable (LANTUS) 16 Unit(s) SubCutaneous at bedtime  insulin lispro (HumaLOG) corrective regimen sliding scale   SubCutaneous Before meals and at bedtime  losartan 50 milliGRAM(s) Oral daily  metoprolol tartrate 25 milliGRAM(s) Oral two times a day    MEDICATIONS  (PRN):  dextrose 40% Gel 15 Gram(s) Oral once PRN Blood Glucose LESS THAN 70 milliGRAM(s)/deciLiter  glucagon  Injectable 1 milliGRAM(s) IntraMuscular once PRN Glucose <70 milliGRAM(s)/deciLiter  nitroglycerin     SubLingual 0.4 milliGRAM(s) SubLingual every 5 minutes PRN Chest Pain

## 2018-07-06 NOTE — DISCHARGE NOTE ADULT - INSTRUCTIONS
Restricted use with no heavy lifting of affected arm for 48 hours.  No submerging the arm in water for 48 hours.  You may start showering today.  Call your doctor for any bleeding, swelling, loss of sensation in the hand or fingers, or fingers turning blue.  If heavy bleeding or large lumps form, hold pressure at the spot and come to the Emergency Room. A registered dietician can help you create a personalized plan for healthy eating. Make your calories count by choosin. Healthy carbohydrates such as fruits, vegetables, whole grains, legumes (beans, peas and lentils) and low-fat dairy products.  2. Fiber-rich foods such as vegetables, fruits, nuts, legumes, whole-wheat flour and wheat bran.  3. Heart-healthy fish at least twice a week such as cod, tuna and halibut, which are low-fat options as well as salmon, mackerel, tuna, sardines and bluefish, which are rich in omega-3 fatty acids. Avoid fish with high levels of mercury.  4. "Good" fats such as avocados, almonds, pecans, walnuts, olives and canola, olive and peanut oils.     Minimize foods with high saturated fats (beef, hot dogs, sausage and schaefer), trans fats (processed snacks, baked goods, shortening and stick margarines), high cholesterol foods (high fat dairy products and animal proteins, fried food) and high sodium foods (fast food, many frozen foods, processed food). NO CONCENTRATED SWEETS  LOW CARBOHYDRATES  1. Healthy carbohydrates such as fruits, vegetables, whole grains, legumes (beans, peas and lentils) and low-fat dairy products.  2. Fiber-rich foods such as vegetables, fruits, nuts, legumes, whole-wheat flour and wheat bran.  3. Heart-healthy fish at least twice a week such as cod, tuna and halibut, which are low-fat options as well as salmon, mackerel, tuna, sardines and bluefish, which are rich in omega-3 fatty acids. Avoid fish with high levels of mercury.  4. "Good" fats such as avocados, almonds, pecans, walnuts, olives and canola, olive and peanut oils.     Minimize foods with high saturated fats (beef, hot dogs, sausage and schaefer), trans fats (processed snacks, baked goods, shortening and stick margarines), high cholesterol foods (high fat dairy products and animal proteins, fried food) and high sodium foods (fast food, many frozen foods, processed food).

## 2018-07-06 NOTE — ED ADULT NURSE NOTE - OBJECTIVE STATEMENT
Pt states "Noelle been having chest pains for a few weeks, noelle been trying to do yard work and play [employer] but I got short of breath and my chest pains came back", pt denies n/v, denies dizziness/weakness, hx of DM, no recent injury/trauma to chest, went to PMD and they sent him to ED for possible cath

## 2018-07-06 NOTE — DISCHARGE NOTE ADULT - CARE PROVIDER_API CALL
Manny Martinez), Cardiovascular Disease  [address] [place] [address]  Phone: [phone]  Fax: [phone] Manny Martinez), Cardiovascular Disease  [address] [facility]  [address]  [place] [address]  Phone: [phone]  Fax: [phone]    Dusty Muhammad), [facility]  [address] [place] [address]  Phone: [phone]  Fax: [phone]

## 2018-07-06 NOTE — CONSULT NOTE ADULT - PROBLEM SELECTOR RECOMMENDATION 9
Preop workup including labs, TTE, carotid duplex, and PFT's.  Maintain on medicine service for now.  Plan for possible surgery Monday or Tuesday.  Discussed with Dr. Muhammad.    Further plans to follow.

## 2018-07-06 NOTE — PROGRESS NOTE ADULT - SUBJECTIVE AND OBJECTIVE BOX
CARDIOLOGY PROGRESS NOTE   ([facility])                                                                                                        Subjective:     laying in bed  Denied dyspnea, CP, palpitation  Denied SOB.  NAD    Vitals:  T(C): 36.7 (07-06-18 @ 04:16), Max: 36.7 (07-06-18 @ 04:16)  HR: 77 (07-06-18 @ 04:16) (75 - 77)  BP: 132/86 (07-06-18 @ 04:16) (132/86 - 145/94)  RR: 18 (07-06-18 @ 04:16) (18 - 20)  SpO2: 98% (07-06-18 @ 04:16) (98% - 99%)  Wt(kg): --  I&O's Summary    Height (cm): 162.56 (07-05 @ 18:07)  Weight (kg): 61.2 (07-05 @ 18:07)  BMI (kg/m2): 23.2 (07-05 @ 18:07)  BSA (m2): 1.65 (07-05 @ 18:07)    PHYSICAL EXAM:  Appearance: Normal	  HEENT:   Atraumatic  Cardiovascular: Normal S1 S2, No JVD, No murmurs, No edema  Respiratory: Lungs clear to auscultation	  Gastrointestinal:  Soft, Non-tender, + BS	  Skin: No rashes, No ecchymoses, No cyanosis  Neurologic: Alert and awake, able to move extremities  Extremities: No edema    TELEMETRY: 	      CURRENT MEDICATIONS:  losartan 50 milliGRAM(s) Oral daily  metoprolol tartrate 25 milliGRAM(s) Oral two times a day  nitroglycerin     SubLingual 0.4 milliGRAM(s) SubLingual every 5 minutes PRN        aspirin 325 milliGRAM(s) Oral daily      atorvastatin 40 milliGRAM(s) Oral at bedtime  dextrose 40% Gel 15 Gram(s) Oral once PRN  dextrose 50% Injectable 12.5 Gram(s) IV Push once  dextrose 50% Injectable 25 Gram(s) IV Push once  dextrose 50% Injectable 25 Gram(s) IV Push once  glucagon  Injectable 1 milliGRAM(s) IntraMuscular once PRN  insulin glargine Injectable (LANTUS) 16 Unit(s) SubCutaneous at bedtime  insulin lispro (HumaLOG) corrective regimen sliding scale   SubCutaneous Before meals and at bedtime    dextrose 5%. 1000 milliLiter(s) IV Continuous <Continuous>  enoxaparin Injectable 40 milliGRAM(s) SubCutaneous daily  sodium chloride 0.9%. 1000 milliLiter(s) IV Continuous <Continuous>      LABS:	 	                            13.2   4.8   )-----------( 151      ( 05 Jul 2018 21:48 )             38.2     07-05    141  |  102  |  16.0  ----------------------------<  161<H>  3.8   |  25.0  |  0.97    Ca    9.3      05 Jul 2018 21:48    TPro  7.0  /  Alb  4.0  /  TBili  0.3<L>  /  DBili  x   /  AST  21  /  ALT  18  /  AlkPhos  51  07-05    proBNP: Serum Pro-Brain Natriuretic Peptide: 59 pg/mL (07-05 @ 21:48)

## 2018-07-06 NOTE — PROGRESS NOTE ADULT - PROBLEM SELECTOR PLAN 1
DC ARB in the event of CTS  Add PPI/hydralazine  RRA site care w/instructions to pt  ECHO pending  Dr. Solis to review findings/options with patient  FU outpt [place] [facility] Dr. Dickerson

## 2018-07-06 NOTE — DISCHARGE NOTE ADULT - OTHER SIGNIFICANT FINDINGS
Patient assessment, examination, discharge planning, coordination of care, patient and family education and counseling took me 45 minutes to complete. Patient assessment, examination, discharge planning, coordination of care, patient and family education and counseling took me 45 minutes to complete. c

## 2018-07-06 NOTE — ED ADULT NURSE REASSESSMENT NOTE - NS ED NURSE REASSESS COMMENT FT1
Francoise 195, 1 unit of humalog given, pt NPO starting at midnight and aware of plan of care, resting comfortably in stretcher, awaiting room assignment.
Pt resting comfortably in stretcher, resp even and unlabored, pt aware of plan of care, continues to show NSR on monitor, offers no complaints at this time, awaiting room assignment, will continue to monitor.

## 2018-07-07 LAB
ALBUMIN SERPL ELPH-MCNC: 3.9 G/DL — SIGNIFICANT CHANGE UP (ref 3.3–5.2)
ALP SERPL-CCNC: 43 U/L — SIGNIFICANT CHANGE UP (ref 40–120)
ALT FLD-CCNC: 19 U/L — SIGNIFICANT CHANGE UP
ANION GAP SERPL CALC-SCNC: 11 MMOL/L — SIGNIFICANT CHANGE UP (ref 5–17)
APPEARANCE UR: CLEAR — SIGNIFICANT CHANGE UP
APTT BLD: 33.3 SEC — SIGNIFICANT CHANGE UP (ref 27.5–37.4)
AST SERPL-CCNC: 19 U/L — SIGNIFICANT CHANGE UP
BACTERIA # UR AUTO: ABNORMAL
BILIRUB SERPL-MCNC: 0.4 MG/DL — SIGNIFICANT CHANGE UP (ref 0.4–2)
BILIRUB UR-MCNC: NEGATIVE — SIGNIFICANT CHANGE UP
BUN SERPL-MCNC: 16 MG/DL — SIGNIFICANT CHANGE UP (ref 8–20)
CALCIUM SERPL-MCNC: 8.7 MG/DL — SIGNIFICANT CHANGE UP (ref 8.6–10.2)
CHLORIDE SERPL-SCNC: 104 MMOL/L — SIGNIFICANT CHANGE UP (ref 98–107)
CO2 SERPL-SCNC: 26 MMOL/L — SIGNIFICANT CHANGE UP (ref 22–29)
COLOR SPEC: YELLOW — SIGNIFICANT CHANGE UP
CREAT SERPL-MCNC: 0.89 MG/DL — SIGNIFICANT CHANGE UP (ref 0.5–1.3)
DIFF PNL FLD: NEGATIVE — SIGNIFICANT CHANGE UP
EOSINOPHIL # BLD AUTO: 0.3 K/UL — SIGNIFICANT CHANGE UP (ref 0–0.5)
EOSINOPHIL NFR BLD AUTO: 4.9 % — SIGNIFICANT CHANGE UP (ref 0–5)
EPI CELLS # UR: SIGNIFICANT CHANGE UP
GLUCOSE BLDC GLUCOMTR-MCNC: 117 MG/DL — HIGH (ref 70–99)
GLUCOSE BLDC GLUCOMTR-MCNC: 157 MG/DL — HIGH (ref 70–99)
GLUCOSE BLDC GLUCOMTR-MCNC: 189 MG/DL — HIGH (ref 70–99)
GLUCOSE BLDC GLUCOMTR-MCNC: 88 MG/DL — SIGNIFICANT CHANGE UP (ref 70–99)
GLUCOSE SERPL-MCNC: 111 MG/DL — SIGNIFICANT CHANGE UP (ref 70–115)
GLUCOSE UR QL: NEGATIVE MG/DL — SIGNIFICANT CHANGE UP
HBA1C BLD-MCNC: 7.4 % — HIGH (ref 4–5.6)
HCT VFR BLD CALC: 40.2 % — LOW (ref 42–52)
HGB BLD-MCNC: 13.5 G/DL — LOW (ref 14–18)
INR BLD: 1.04 RATIO — SIGNIFICANT CHANGE UP (ref 0.88–1.16)
KETONES UR-MCNC: NEGATIVE — SIGNIFICANT CHANGE UP
LEUKOCYTE ESTERASE UR-ACNC: NEGATIVE — SIGNIFICANT CHANGE UP
LYMPHOCYTES # BLD AUTO: 2.1 K/UL — SIGNIFICANT CHANGE UP (ref 1–4.8)
LYMPHOCYTES # BLD AUTO: 34.9 % — SIGNIFICANT CHANGE UP (ref 20–55)
MAGNESIUM SERPL-MCNC: 2 MG/DL — SIGNIFICANT CHANGE UP (ref 1.6–2.6)
MCHC RBC-ENTMCNC: 28.1 PG — SIGNIFICANT CHANGE UP (ref 27–31)
MCHC RBC-ENTMCNC: 33.6 G/DL — SIGNIFICANT CHANGE UP (ref 32–36)
MCV RBC AUTO: 83.6 FL — SIGNIFICANT CHANGE UP (ref 80–94)
MONOCYTES # BLD AUTO: 0.4 K/UL — SIGNIFICANT CHANGE UP (ref 0–0.8)
MONOCYTES NFR BLD AUTO: 5.9 % — SIGNIFICANT CHANGE UP (ref 3–10)
MRSA PCR RESULT.: SIGNIFICANT CHANGE UP
NEUTROPHILS # BLD AUTO: 3.2 K/UL — SIGNIFICANT CHANGE UP (ref 1.8–8)
NEUTROPHILS NFR BLD AUTO: 54.1 % — SIGNIFICANT CHANGE UP (ref 37–73)
NITRITE UR-MCNC: NEGATIVE — SIGNIFICANT CHANGE UP
NT-PROBNP SERPL-SCNC: 103 PG/ML — SIGNIFICANT CHANGE UP (ref 0–300)
PH UR: 6 — SIGNIFICANT CHANGE UP (ref 5–8)
PHOSPHATE SERPL-MCNC: 3.3 MG/DL — SIGNIFICANT CHANGE UP (ref 2.4–4.7)
PLATELET # BLD AUTO: 157 K/UL — SIGNIFICANT CHANGE UP (ref 150–400)
POTASSIUM SERPL-MCNC: 4 MMOL/L — SIGNIFICANT CHANGE UP (ref 3.5–5.3)
POTASSIUM SERPL-SCNC: 4 MMOL/L — SIGNIFICANT CHANGE UP (ref 3.5–5.3)
PREALB SERPL-MCNC: 24 MG/DL — SIGNIFICANT CHANGE UP (ref 18–38)
PROT SERPL-MCNC: 6.8 G/DL — SIGNIFICANT CHANGE UP (ref 6.6–8.7)
PROT UR-MCNC: NEGATIVE MG/DL — SIGNIFICANT CHANGE UP
PROTHROM AB SERPL-ACNC: 11.5 SEC — SIGNIFICANT CHANGE UP (ref 9.8–12.7)
RBC # BLD: 4.81 M/UL — SIGNIFICANT CHANGE UP (ref 4.6–6.2)
RBC # FLD: 13.4 % — SIGNIFICANT CHANGE UP (ref 11–15.6)
RBC CASTS # UR COMP ASSIST: SIGNIFICANT CHANGE UP /HPF (ref 0–4)
S AUREUS DNA NOSE QL NAA+PROBE: SIGNIFICANT CHANGE UP
SODIUM SERPL-SCNC: 141 MMOL/L — SIGNIFICANT CHANGE UP (ref 135–145)
SP GR SPEC: 1.01 — SIGNIFICANT CHANGE UP (ref 1.01–1.02)
UROBILINOGEN FLD QL: NEGATIVE MG/DL — SIGNIFICANT CHANGE UP
WBC # BLD: 5.9 K/UL — SIGNIFICANT CHANGE UP (ref 4.8–10.8)
WBC # FLD AUTO: 5.9 K/UL — SIGNIFICANT CHANGE UP (ref 4.8–10.8)
WBC UR QL: SIGNIFICANT CHANGE UP

## 2018-07-07 PROCEDURE — 99232 SBSQ HOSP IP/OBS MODERATE 35: CPT

## 2018-07-07 PROCEDURE — 93306 TTE W/DOPPLER COMPLETE: CPT | Mod: 26

## 2018-07-07 PROCEDURE — 93880 EXTRACRANIAL BILAT STUDY: CPT | Mod: 26

## 2018-07-07 RX ADMIN — CHLORHEXIDINE GLUCONATE 1 APPLICATION(S): 213 SOLUTION TOPICAL at 21:27

## 2018-07-07 RX ADMIN — CHLORHEXIDINE GLUCONATE 15 MILLILITER(S): 213 SOLUTION TOPICAL at 05:58

## 2018-07-07 RX ADMIN — Medication 1: at 12:35

## 2018-07-07 RX ADMIN — CHLORHEXIDINE GLUCONATE 15 MILLILITER(S): 213 SOLUTION TOPICAL at 17:55

## 2018-07-07 RX ADMIN — Medication 1: at 22:27

## 2018-07-07 RX ADMIN — ATORVASTATIN CALCIUM 40 MILLIGRAM(S): 80 TABLET, FILM COATED ORAL at 22:26

## 2018-07-07 RX ADMIN — PANTOPRAZOLE SODIUM 40 MILLIGRAM(S): 20 TABLET, DELAYED RELEASE ORAL at 05:58

## 2018-07-07 RX ADMIN — INSULIN GLARGINE 16 UNIT(S): 100 INJECTION, SOLUTION SUBCUTANEOUS at 22:26

## 2018-07-07 RX ADMIN — Medication 325 MILLIGRAM(S): at 17:55

## 2018-07-07 RX ADMIN — ENOXAPARIN SODIUM 40 MILLIGRAM(S): 100 INJECTION SUBCUTANEOUS at 22:26

## 2018-07-07 RX ADMIN — Medication 25 MILLIGRAM(S): at 05:58

## 2018-07-07 RX ADMIN — Medication 25 MILLIGRAM(S): at 17:55

## 2018-07-07 RX ADMIN — CHLORHEXIDINE GLUCONATE 1 APPLICATION(S): 213 SOLUTION TOPICAL at 09:21

## 2018-07-07 NOTE — PROGRESS NOTE ADULT - SUBJECTIVE AND OBJECTIVE BOX
Post cath check  Feels fine  aware he needs cabg  no further cp sob or palp  Cm sinus sharon  Labs reviewed    b/p 124/70  Hr 56  Lungs clear  Heart s1&s2 bradycardic  Abd soft non tender  Ext no edema right radial site benign  Neuro alert and oriented    49y/o male with DM, Htn, Hld presents with cp, neg trop  cathed and found to have multi vessel cad    CAD  seen by CV team  CAbg planned early next week  Echo, carotids pending  continue asa, metoprolol and lipitor  Discussed plan of care with wife    DM  metformin d/haylie  sliding scale coverage [facility]                                                                          [facility]                                                                                                   [address], [place]  [address]                                                                                           T [phone]  F [phone]      Patient seen follow up on cath    Feels fine  aware he needs cabg        ROS:  CV:  No cp, sob or palpitations  Resp:  No cough, wheeze, mucus production  GI:  no abd pain, nausea, melana  :  no hematuria no dysuria  SKIN: no rashes, hematomas          b/p 124/70  Hr 56  Lungs clear  Heart s1&s2 bradycardic  Abd soft non tender  Ext no edema right radial site benign  Neuro alert and oriented      aspirin 325 milliGRAM(s) Oral daily  atorvastatin 40 milliGRAM(s) Oral at bedtime  chlorhexidine 0.12% Liquid 15 milliLiter(s) Swish and Spit two times a day  chlorhexidine 4% Liquid 1 Application(s) Topical two times a day  enoxaparin Injectable 40 milliGRAM(s) SubCutaneous daily  glucagon  Injectable 1 milliGRAM(s) IntraMuscular once PRN  hydrALAZINE 25 milliGRAM(s) Oral two times a day  insulin glargine Injectable (LANTUS) 16 Unit(s) SubCutaneous at bedtime  insulin lispro (HumaLOG) corrective regimen sliding scale   SubCutaneous Before meals and at bedtime  metoprolol tartrate 25 milliGRAM(s) Oral two times a day  nitroglycerin     SubLingual 0.4 milliGRAM(s) SubLingual every 5 minutes PRN  pantoprazole    Tablet 40 milliGRAM(s) Oral before breakfast  sodium chloride 0.9%. 200 milliLiter(s) IV Continuous <Continuous>    I&O's Summary    2018 07:01  -  2018 07:00  --------------------------------------------------------  IN: 240 mL / OUT: 0 mL / NET: 240 mL      Daily Height in cm: 160.02 (2018 05:55)    Daily Weight in k.6 (2018 04:21)    LABS    CBC Full  -  ( 2018 06:20 )  WBC Count : 5.9 K/uL  Hemoglobin : 13.5 g/dL  Hematocrit : 40.2 %  Platelet Count - Automated : 157 K/uL  Mean Cell Volume : 83.6 fl  Mean Cell Hemoglobin : 28.1 pg  Mean Cell Hemoglobin Concentration : 33.6 g/dL  Auto Neutrophil # : 3.2 K/uL  Auto Lymphocyte # : 2.1 K/uL  Auto Monocyte # : 0.4 K/uL  Auto Eosinophil # : 0.3 K/uL  Auto Basophil # : x  Auto Neutrophil % : 54.1 %  Auto Lymphocyte % : 34.9 %  Auto Monocyte % : 5.9 %  Auto Eosinophil % : 4.9 %  Auto Basophil % : x    07-    141  |  104  |  16.0  ----------------------------<  111  4.0   |  26.0  |  0.89    Ca    8.7      2018 06:20  Phos  3.3     07-  Mg     2.0     07-    TPro  6.8  /  Alb  3.9  /  TBili  0.4  /  DBili  x   /  AST  19  /  ALT  19  /  AlkPhos  43  07-07    CARDIAC MARKERS ( 2018 08:15 )  x     / 0.05 ng/mL / 212 U/L / x     / 3.1 ng/mL  CARDIAC MARKERS ( 2018 21:48 )  x     / 0.06 ng/mL / 422 U/L / x     / 3.9 ng/mL        TELEMETRY:  Ekg:  Echo:  Cath:    PE Exam  NECK supple no jvd  Lungs clear to ausculatation  Heart s1&s2   Abd soft active bowel sounds non tender  Ext No c/c/e  right radial site benign  Neuro alert and oriented no focal findings    A/P  49y/o male with DM, Htn, Hld presents with cp, neg trop  cathed and found to have multi vessel cad    CAD  seen by CV team  CAbg planned early next week  Echo, carotids pending  continue asa, metoprolol and lipitor  Discussed plan of care with wife    DM  metformin d/haylie  sliding scale coverage [facility]                                                                          [facility]                                                                                                   [address], [place]  [address]                                                                                           T [phone]  F [phone]      Patient seen follow up on cath    Feels fine  aware he needs cabg        ROS:  CV:  No cp, sob or palpitations  Resp:  No cough, wheeze, mucus production  GI:  no abd pain, nausea, melana  :  no hematuria no dysuria  SKIN: no rashes, hematomas          b/p 124/70  Hr 56  Lungs clear  Heart s1&s2 bradycardic  Abd soft non tender  Ext no edema right radial site benign  Neuro alert and oriented      aspirin 325 milliGRAM(s) Oral daily  atorvastatin 40 milliGRAM(s) Oral at bedtime  chlorhexidine 0.12% Liquid 15 milliLiter(s) Swish and Spit two times a day  chlorhexidine 4% Liquid 1 Application(s) Topical two times a day  enoxaparin Injectable 40 milliGRAM(s) SubCutaneous daily  glucagon  Injectable 1 milliGRAM(s) IntraMuscular once PRN  hydrALAZINE 25 milliGRAM(s) Oral two times a day  insulin glargine Injectable (LANTUS) 16 Unit(s) SubCutaneous at bedtime  insulin lispro (HumaLOG) corrective regimen sliding scale   SubCutaneous Before meals and at bedtime  metoprolol tartrate 25 milliGRAM(s) Oral two times a day  nitroglycerin     SubLingual 0.4 milliGRAM(s) SubLingual every 5 minutes PRN  pantoprazole    Tablet 40 milliGRAM(s) Oral before breakfast  sodium chloride 0.9%. 200 milliLiter(s) IV Continuous <Continuous>    I&O's Summary    2018 07:01  -  2018 07:00  --------------------------------------------------------  IN: 240 mL / OUT: 0 mL / NET: 240 mL      Daily Height in cm: 160.02 (2018 05:55)    Daily Weight in k.6 (2018 04:21)    LABS    CBC Full  -  ( 2018 06:20 )  WBC Count : 5.9 K/uL  Hemoglobin : 13.5 g/dL  Hematocrit : 40.2 %  Platelet Count - Automated : 157 K/uL  Mean Cell Volume : 83.6 fl  Mean Cell Hemoglobin : 28.1 pg  Mean Cell Hemoglobin Concentration : 33.6 g/dL  Auto Neutrophil # : 3.2 K/uL  Auto Lymphocyte # : 2.1 K/uL  Auto Monocyte # : 0.4 K/uL  Auto Eosinophil # : 0.3 K/uL  Auto Basophil # : x  Auto Neutrophil % : 54.1 %  Auto Lymphocyte % : 34.9 %  Auto Monocyte % : 5.9 %  Auto Eosinophil % : 4.9 %  Auto Basophil % : x    07-    141  |  104  |  16.0  ----------------------------<  111  4.0   |  26.0  |  0.89    Ca    8.7      2018 06:20  Phos  3.3     07-  Mg     2.0     07-    TPro  6.8  /  Alb  3.9  /  TBili  0.4  /  DBili  x   /  AST  19  /  ALT  19  /  AlkPhos  43  07-07    CARDIAC MARKERS ( 2018 08:15 )  x     / 0.05 ng/mL / 212 U/L / x     / 3.1 ng/mL  CARDIAC MARKERS ( 2018 21:48 )  x     / 0.06 ng/mL / 422 U/L / x     / 3.9 ng/mL          PE Exam  NECK supple no jvd  Lungs clear to ausculatation  Heart s1&s2   Abd soft active bowel sounds non tender  Ext No c/c/e  right radial site benign  Neuro alert and oriented no focal findings    A/P  49y/o male with DM, Htn, Hld presents with cp, neg trop  cathed and found to have multi vessel cad    CAD  seen by CV team  CAbg planned early next week  Echo, carotids pending  continue asa, metoprolol and lipitor  Discussed plan of care with wife    DM  metformin d/haylie  sliding scale coverage

## 2018-07-07 NOTE — PROGRESS NOTE ADULT - SUBJECTIVE AND OBJECTIVE BOX
Patient: SIMRAN VELASCO [id number] 48y Male                           Internal Medicine Hospitalist Progress Note  Chief Complaint: Patient is a 48y old  Male who presents with a chief complaint of Chest Pain (2018 23:34)    HPI:  48 years old male with PMH of DM, HTN and HLD comes with Chest Pain. As per patient, he has intermittent retrosternal chest pain for a month. It is mostly on exertion - dull and achy, non radiating, relieves with rest and is associated with shortness of breath and dizziness. Today, he went to his PMD for routine follow up and was found to have new EKG changes so he was sent to ER for further evaluation.  Denies fever, cough, palpitations, nausea, vomiting, diaphoresis, sick contacts or recent travel. (2018 23:34)    Interim History:   s/p cath showing multivessel CAD per cardiology.  Pt denies chest pain / palpitations.  no SOB.  Awaiting workup for possible CABG.      ____________________PHYSICAL EXAM:  Vitals reviewed as indicated below  GENERAL:  NAD Alert and Oriented x 3   HEENT: NCAT  CARDIOVASCULAR:  S1, S2  LUNGS: CTAB  ABDOMEN:  soft, (-) tenderness, (-) distension, (+) bowel sounds, (-) guarding, (-) rebound (-) rigidity  EXTREMITIES:  no cyanosis / clubbing / edema.   ____________________    VITALS:  Vital Signs Last 24 Hrs  T(C): 36.7 (2018 10:13), Max: 37.1 (2018 21:43)  T(F): 98.1 (2018 10:13), Max: 98.7 (2018 21:43)  HR: 60 (2018 10:13) (60 - 88)  BP: 129/89 (2018 10:13) (114/72 - 159/80)  BP(mean): --  RR: 17 (2018 10:13) (10 - 19)  SpO2: 98% (2018 10:13) (96% - 100%) Daily Height in cm: 160.02 (2018 05:55)    Daily Weight in k.6 (2018 04:21)  CAPILLARY BLOOD GLUCOSE      POCT Blood Glucose.: 189 mg/dL (2018 12:24)  POCT Blood Glucose.: 117 mg/dL (2018 08:20)  POCT Blood Glucose.: 170 mg/dL (2018 21:07)  POCT Blood Glucose.: 166 mg/dL (2018 15:44)    I&O's Summary    2018 07:01  -  2018 07:00  --------------------------------------------------------  IN: 240 mL / OUT: 0 mL / NET: 240 mL        LABS:                        13.5   5.9   )-----------( 157      ( 2018 06:20 )             40.2     07-07    141  |  104  |  16.0  ----------------------------<  111  4.0   |  26.0  |  0.89    Ca    8.7      2018 06:20  Phos  3.3     07-07  Mg     2.0     07-07    TPro  6.8  /  Alb  3.9  /  TBili  0.4  /  DBili  x   /  AST  19  /  ALT  19  /  AlkPhos  43  07-07    PT/INR - ( 2018 06:05 )   PT: 11.5 sec;   INR: 1.04 ratio         PTT - ( 2018 06:05 )  PTT:33.3 sec  LIVER FUNCTIONS - ( 2018 06:20 )  Alb: 3.9 g/dL / Pro: 6.8 g/dL / ALK PHOS: 43 U/L / ALT: 19 U/L / AST: 19 U/L / GGT: x           Urinalysis Basic - ( 2018 11:56 )    Color: Yellow / Appearance: Clear / S.010 / pH: x  Gluc: x / Ketone: Negative  / Bili: Negative / Urobili: Negative mg/dL   Blood: x / Protein: Negative mg/dL / Nitrite: Negative   Leuk Esterase: Negative / RBC: x / WBC x   Sq Epi: x / Non Sq Epi: x / Bacteria: x      CARDIAC MARKERS ( 2018 08:15 )  x     / 0.05 ng/mL / 212 U/L / x     / 3.1 ng/mL  CARDIAC MARKERS ( 2018 21:48 )  x     / 0.06 ng/mL / 422 U/L / x     / 3.9 ng/mL        MEDICATIONS:  aspirin 325 milliGRAM(s) Oral daily  atorvastatin 40 milliGRAM(s) Oral at bedtime  chlorhexidine 0.12% Liquid 15 milliLiter(s) Swish and Spit two times a day  chlorhexidine 4% Liquid 1 Application(s) Topical two times a day  dextrose 40% Gel 15 Gram(s) Oral once PRN  dextrose 5%. 1000 milliLiter(s) IV Continuous <Continuous>  dextrose 50% Injectable 12.5 Gram(s) IV Push once  dextrose 50% Injectable 25 Gram(s) IV Push once  dextrose 50% Injectable 25 Gram(s) IV Push once  enoxaparin Injectable 40 milliGRAM(s) SubCutaneous daily  glucagon  Injectable 1 milliGRAM(s) IntraMuscular once PRN  hydrALAZINE 25 milliGRAM(s) Oral two times a day  insulin glargine Injectable (LANTUS) 16 Unit(s) SubCutaneous at bedtime  insulin lispro (HumaLOG) corrective regimen sliding scale   SubCutaneous Before meals and at bedtime  metoprolol tartrate 25 milliGRAM(s) Oral two times a day  nitroglycerin     SubLingual 0.4 milliGRAM(s) SubLingual every 5 minutes PRN  pantoprazole    Tablet 40 milliGRAM(s) Oral before breakfast  sodium chloride 0.9%. 200 milliLiter(s) IV Continuous <Continuous>

## 2018-07-07 NOTE — PROGRESS NOTE ADULT - PROBLEM SELECTOR PLAN 2
COnt Dm Diet, MARK   may need endocrine consult COnt Dm Diet, MARK   may need endocrine consult  cont lantus

## 2018-07-07 NOTE — PROGRESS NOTE ADULT - SUBJECTIVE AND OBJECTIVE BOX
Subjective: " Hello" Pt in bed NAD     VITAL SIGNS  T(C): 36.7 (18 @ 10:13), Max: 37.1 (18 @ 21:43)  HR: 60 (18 @ 10:13) (60 - 88)  BP: 129/89 (18 @ 10:13) (114/72 - 159/80)  RR: 17 (18 @ 10:13) (10 - 19)  SpO2: 98% (18 @ 10:13) (96% - 100%) RA        Daily Height in cm: 160.02 (2018 05:55)    Daily Weight in k.6 (2018 04:21)Height (cm): 160.02 ( @ 05:55)  Weight (kg): 61.2 ( 05:55)  BMI (kg/m2): 23.9 ( 05:55)  BSA (m2): 1.64 ( 05:55)  Admit Wt: Drug Dosing Weight  Height (cm): 160.02 (2018 05:55)  Weight (kg): 61.2 (2018 05:55)    Telemetry:   SR   LVEF: Pending     MEDICATIONS  aspirin 325 milliGRAM(s) Oral daily  atorvastatin 40 milliGRAM(s) Oral at bedtime  chlorhexidine 0.12% Liquid 15 milliLiter(s) Swish and Spit two times a day  chlorhexidine 4% Liquid 1 Application(s) Topical two times a day  dextrose 40% Gel 15 Gram(s) Oral once PRN  dextrose 5%. 1000 milliLiter(s) IV Continuous <Continuous>  dextrose 50% Injectable 12.5 Gram(s) IV Push once  dextrose 50% Injectable 25 Gram(s) IV Push once  dextrose 50% Injectable 25 Gram(s) IV Push once  enoxaparin Injectable 40 milliGRAM(s) SubCutaneous daily  glucagon  Injectable 1 milliGRAM(s) IntraMuscular once PRN  hydrALAZINE 25 milliGRAM(s) Oral two times a day  insulin glargine Injectable (LANTUS) 16 Unit(s) SubCutaneous at bedtime  insulin lispro (HumaLOG) corrective regimen sliding scale   SubCutaneous Before meals and at bedtime  metoprolol tartrate 25 milliGRAM(s) Oral two times a day  nitroglycerin     SubLingual 0.4 milliGRAM(s) SubLingual every 5 minutes PRN  pantoprazole    Tablet 40 milliGRAM(s) Oral before breakfast  sodium chloride 0.9%. 200 milliLiter(s) IV Continuous <Continuous>    MEDICATIONS  (PRN):  dextrose 40% Gel 15 Gram(s) Oral once PRN Blood Glucose LESS THAN 70 milliGRAM(s)/deciLiter  glucagon  Injectable 1 milliGRAM(s) IntraMuscular once PRN Glucose <70 milliGRAM(s)/deciLiter  nitroglycerin     SubLingual 0.4 milliGRAM(s) SubLingual every 5 minutes PRN Chest Pain        PHYSICAL EXAM  General :Alert  Awake NAD   Respiratory:  clear b/l   CV: RRR S1 S2   Abdomen:  soft NT ND + BS   Extremities: trace edema b/l LE             I&O's Detail    2018 07:01  -  2018 07:00  --------------------------------------------------------  IN:    Oral Fluid: 240 mL  Total IN: 240 mL    OUT:  Total OUT: 0 mL    Total NET: 240 mL          LABS      141  |  104  |  16.0  ----------------------------<  111  4.0   |  26.0  |  0.89    Ca    8.7      2018 06:20  Phos  3.3     07-  Mg     2.0         TPro  6.8  /  Alb  3.9  /  TBili  0.4  /  DBili  x   /  AST  19  /  ALT  19  /  AlkPhos  43  -07                                 13.5   5.9   )-----------( 157      ( 2018 06:20 )             40.2          PT/INR - ( 2018 06:05 )   PT: 11.5 sec;   INR: 1.04 ratio         PTT - ( 2018 06:05 )  PTT:33.3 sec      LIVER FUNCTIONS - ( 2018 06:20 )  Alb: 3.9 g/dL / Pro: 6.8 g/dL / ALK PHOS: 43 U/L / ALT: 19 U/L / AST: 19 U/L / GGT: x              CAPILLARY BLOOD GLUCOSE      POCT Blood Glucose.: 189 mg/dL (2018 12:24)  POCT Blood Glucose.: 117 mg/dL (2018 08:20)  POCT Blood Glucose.: 170 mg/dL (2018 21:07)  POCT Blood Glucose.: 166 mg/dL (2018 15:44)           Today's CXR: < from: Xray Chest 1 View- PORTABLE-Urgent (18 @ 21:53) >  Single frontal view of the chest demonstrates the lungs to be clear. The   cardiomediastinal silhouette is normal. No acute osseous abnormalities.   Overlying EKG leads and wires are noted.    IMPRESSION: No acute cardiopulmonary disease process.    < end of copied text >      PAST MEDICAL & SURGICAL HISTORY:  HLD (hyperlipidemia)  HTN (hypertension)  IDDM (insulin dependent diabetes mellitus)  No significant past surgical history       ASSESSMENT  48y Male       PLAN  Neuro: Pain management  Pulm: Encourage coughing, deep breathing and use of incentive spirometry. Wean off supplemental oxygen as able. Daily CXR.   Cardio: Continue Aspirin, Lipitor. (BB)  GI: Tolerating diet. Continue stool softeners.  Renal: Keep negative fluid balance. (Diuretics) Trend BUN/Cr. Supplement electrolytes prn.   :   Vasc: Lovenox/SCDs for DVT prophylaxis  Heme: Stable H/H. Trend CBC daily.   Endo:  ID: Off antibiotics. Stable.  Therapy: PT daily as tolerated.  Tubes/Wires:   Disposition: plan to d/c (?)  Discussed with Cardiothoracic Team at AM rounds.

## 2018-07-07 NOTE — PROGRESS NOTE ADULT - PROBLEM SELECTOR PLAN 1
pre op workup in progress  Plan for surgery Monday/Tuesday. Awaiting Dr Hammond decision  Cont lipitor BB and statin

## 2018-07-08 ENCOUNTER — TRANSCRIPTION ENCOUNTER (OUTPATIENT)
Age: 48
End: 2018-07-08

## 2018-07-08 LAB
ABO RH CONFIRMATION: SIGNIFICANT CHANGE UP
BLD GP AB SCN SERPL QL: SIGNIFICANT CHANGE UP
GLUCOSE BLDC GLUCOMTR-MCNC: 146 MG/DL — HIGH (ref 70–99)
GLUCOSE BLDC GLUCOMTR-MCNC: 185 MG/DL — HIGH (ref 70–99)
GLUCOSE BLDC GLUCOMTR-MCNC: 234 MG/DL — HIGH (ref 70–99)
GLUCOSE BLDC GLUCOMTR-MCNC: 96 MG/DL — SIGNIFICANT CHANGE UP (ref 70–99)
TYPE + AB SCN PNL BLD: SIGNIFICANT CHANGE UP

## 2018-07-08 PROCEDURE — 99232 SBSQ HOSP IP/OBS MODERATE 35: CPT | Mod: 24

## 2018-07-08 RX ORDER — VANCOMYCIN HCL 1 G
1000 VIAL (EA) INTRAVENOUS ONCE
Qty: 0 | Refills: 0 | Status: COMPLETED | OUTPATIENT
Start: 2018-07-09 | End: 2018-07-09

## 2018-07-08 RX ORDER — INSULIN LISPRO 100/ML
4 VIAL (ML) SUBCUTANEOUS
Qty: 0 | Refills: 0 | Status: DISCONTINUED | OUTPATIENT
Start: 2018-07-08 | End: 2018-07-08

## 2018-07-08 RX ORDER — CEFUROXIME AXETIL 250 MG
1500 TABLET ORAL ONCE
Qty: 0 | Refills: 0 | Status: COMPLETED | OUTPATIENT
Start: 2018-07-09 | End: 2018-07-09

## 2018-07-08 RX ADMIN — CHLORHEXIDINE GLUCONATE 1 APPLICATION(S): 213 SOLUTION TOPICAL at 21:06

## 2018-07-08 RX ADMIN — Medication 1: at 12:36

## 2018-07-08 RX ADMIN — CHLORHEXIDINE GLUCONATE 1 APPLICATION(S): 213 SOLUTION TOPICAL at 10:34

## 2018-07-08 RX ADMIN — CHLORHEXIDINE GLUCONATE 15 MILLILITER(S): 213 SOLUTION TOPICAL at 06:07

## 2018-07-08 RX ADMIN — ENOXAPARIN SODIUM 40 MILLIGRAM(S): 100 INJECTION SUBCUTANEOUS at 21:11

## 2018-07-08 RX ADMIN — Medication 2: at 21:58

## 2018-07-08 RX ADMIN — Medication 25 MILLIGRAM(S): at 06:07

## 2018-07-08 RX ADMIN — PANTOPRAZOLE SODIUM 40 MILLIGRAM(S): 20 TABLET, DELAYED RELEASE ORAL at 06:07

## 2018-07-08 RX ADMIN — Medication 25 MILLIGRAM(S): at 18:56

## 2018-07-08 RX ADMIN — CHLORHEXIDINE GLUCONATE 15 MILLILITER(S): 213 SOLUTION TOPICAL at 21:11

## 2018-07-08 RX ADMIN — ATORVASTATIN CALCIUM 40 MILLIGRAM(S): 80 TABLET, FILM COATED ORAL at 21:11

## 2018-07-08 RX ADMIN — Medication 325 MILLIGRAM(S): at 18:57

## 2018-07-08 NOTE — PROGRESS NOTE ADULT - SUBJECTIVE AND OBJECTIVE BOX
Cardiac Surgery Pre-op Note:                                                                                                                  Surgeon: Jb    Procedure: CABG    Allergies    No Known Allergies    Intolerances      HPI:  48y Male with PMH DM (On insulin), HTN,  and HLD.  Presented to the ER yesterday from PCP office c/o exertional CP and dizziness x 1 month.  Underwent cardiac cath, which revealed severe and diffuse obstructive CAD (offical report pending).  Currently ambulating around room.  Denies SOB or CP.  NAD noted.       PAST MEDICAL & SURGICAL HISTORY:  HLD (hyperlipidemia)  HTN (hypertension)  IDDM (insulin dependent diabetes mellitus)  No significant past surgical history      MEDICATIONS  (STANDING):  aspirin 325 milliGRAM(s) Oral daily  atorvastatin 40 milliGRAM(s) Oral at bedtime  chlorhexidine 0.12% Liquid 15 milliLiter(s) Swish and Spit two times a day  chlorhexidine 4% Liquid 1 Application(s) Topical two times a day  dextrose 5%. 1000 milliLiter(s) (50 mL/Hr) IV Continuous <Continuous>  dextrose 50% Injectable 12.5 Gram(s) IV Push once  dextrose 50% Injectable 25 Gram(s) IV Push once  dextrose 50% Injectable 25 Gram(s) IV Push once  enoxaparin Injectable 40 milliGRAM(s) SubCutaneous daily  hydrALAZINE 25 milliGRAM(s) Oral two times a day  insulin glargine Injectable (LANTUS) 16 Unit(s) SubCutaneous at bedtime  insulin lispro (HumaLOG) corrective regimen sliding scale   SubCutaneous Before meals and at bedtime  metoprolol tartrate 25 milliGRAM(s) Oral two times a day  pantoprazole    Tablet 40 milliGRAM(s) Oral before breakfast  sodium chloride 0.9%. 200 milliLiter(s) (50 mL/Hr) IV Continuous <Continuous>    MEDICATIONS  (PRN):  dextrose 40% Gel 15 Gram(s) Oral once PRN Blood Glucose LESS THAN 70 milliGRAM(s)/deciLiter  glucagon  Injectable 1 milliGRAM(s) IntraMuscular once PRN Glucose <70 milliGRAM(s)/deciLiter  nitroglycerin     SubLingual 0.4 milliGRAM(s) SubLingual every 5 minutes PRN Chest Pain        Labs:                        13.5   5.9   )-----------( 157      ( 2018 06:20 )             40.2     07-07    141  |  104  |  16.0  ----------------------------<  111  4.0   |  26.0  |  0.89    Ca    8.7      2018 06:20  Phos  3.3     -  Mg     2.0     -    TPro  6.8  /  Alb  3.9  /  TBili  0.4  /  DBili  x   /  AST  19  /  ALT  19  /  AlkPhos  43  07-07    PT/INR - ( 2018 06:05 )   PT: 11.5 sec;   INR: 1.04 ratio         PTT - ( 2018 06:05 )  PTT:33.3 sec  Urinalysis Basic - ( 2018 11:56 )    Color: Yellow / Appearance: Clear / S.010 / pH: x  Gluc: x / Ketone: Negative  / Bili: Negative / Urobili: Negative mg/dL   Blood: x / Protein: Negative mg/dL / Nitrite: Negative   Leuk Esterase: Negative / RBC: 0-2 /HPF / WBC 0-2   Sq Epi: x / Non Sq Epi: Occasional / Bacteria: Occasional        Hemoglobin A1C, Whole Blood: 7.4 % ( @ 06:05)      Serum Pro-Brain Natriuretic Peptide: 103 pg/mL (18 @ 06:05)  Serum Pro-Brain Natriuretic Peptide: 59 pg/mL (18 @ 21:48)      MRSA PCR Result.: NotDetec (18 @ 06:56)    Prealbumin, Serum: 24 mg/dL (18 @ 06:05)        CXR: < from: Xray Chest 1 View- PORTABLE-Urgent (18 @ 21:53) >  EXAM:  XR CHEST PORTABLE URGENT 1V                          PROCEDURE DATE:  2018          INTERPRETATION:  TECHNIQUE: Single portable view of the chest.    COMPARISON: None.    CLINICAL HISTORY: Chest pain.     FINDINGS:    Single frontal view of the chest demonstrates the lungs to be clear. The   cardiomediastinal silhouette is normal. No acute osseous abnormalities.   Overlying EKG leads and wires are noted.    IMPRESSION: No acute cardiopulmonary disease process.      HARLEEN CARTWRIGHT M.D., ATTENDING RADIOLOGIST  This document has been electronically signed. 2018  9:46AM    < end of copied text >      EKG:   < from: 12 Lead ECG (18 @ 18:18) >  Ventricular Rate 74 BPM    Atrial Rate 74 BPM    P-R Interval 166 ms    QRS Duration 102 ms    Q-T Interval 392 ms    QTC Calculation(Bezet) 435 ms    P Axis 47 degrees    R Axis -21 degrees    T Axis 130 degrees    Diagnosis Line Normal sinus rhythm  ST & T wave abnormality, consider lateral ischemia  Abnormal ECG    Confirmed by ADOLPH HOOKS ([id number]) on 2018 11:29:52 AM    < end of copied text >    Carotid Duplex: < from: US Duplex Carotid Arteries Complete, Bilateral (18 @ 11:45) >  Impression: No evidence of hemodynamically significant stenosis.                 THEO PABLO M.D., ATTENDING RADIOLOGIST  This document has been electronically signed. 2018 12:13PM    < end of copied text >      PFT's: FEV1 67%   FVC 67%    Echo: < from: TTE Echo Complete w/Doppler (18 @ 10:50) >  Summary:   1. There is moderate septal left ventricular hypertrophy.   2. Left ventricular ejection fraction, by visual estimation, is 55 to   60%.   3. Normal right ventricular size and function.   4. The left atrium is normal in size.   5. The right atrium is normal in size.   6. Thickening of the anterior and posterior mitral valve leaflets.   7. Normal trileaflet aortic valve with normal opening.   8. Mild mitral valve regurgitation.   9. Trace tricuspid regurgitation.  10. There is no evidence of pericardial effusion.    [id number] Emmanuel Ambriz MD, Electronically signed on 2018 at 4:03:54 PM       < end of copied text >      Cath report: < from: Cardiac Cath Lab - Adult (18 @ 16:16) >  VENTRICLES: EF calculated by contrast ventriculography was 55 %.  CORONARY VESSELS: The coronary circulation is right dominant.  LM:   --  Distal left main: There was a 20 % stenosis.  LAD:   --  LAD: The vessel was medium sized and mildly calcified.  --  Proximal LAD: There was a tubular 50 % stenosis in the proximal third  of the vessel segment. In a second lesion, there was a discrete 90 %  stenosis in the middle third of the vessel segment.  --  Mid LAD: There was a tubular 50 % stenosis in the distal third of the  vessel segment.  --  Distal LAD: There was a tubular 80 % stenosis in the middle third of  the vessel segment.  --  D1: The vessel was medium sized. There was a tubular 90 % stenosis in  the proximal third of the vessel segment.  CX:   --  Proximal circumflex: There was a 70 % stenosis.  --  Mid circumflex: There was a tubular 90 % stenosis.  --  OM1: There was a tubular 80 % stenosis in the proximal third of the  vessel segment.  --  OM2: The vessel was medium sized. There was a discrete 60 % stenosis in  the middle third of the vessel segment.  RCA:   --  Proximal RCA: There was a 30 % stenosis.  --  Mid RCA: There was a 40 % stenosis.  --  Distal RCA: There was a discrete 90 % stenosis.  --  RPDA: The vessel was medium sized. There was a tubular 80 % stenosis in  the proximal third of the vessel segment. In a second lesion, there was a  tubular 70 % stenosis in the middle third of the vessel segment. In a  third lesion, there was a discrete 100 % stenosis. Distally fills by  collaterals from the left coronary system  --  RPLS: The vessel was small to medium sized. Angiography showed moderate  atherosclerosis. The artery was supplied by collaterals.  COMPLICATIONS: No complications occurred during the cath lab visit.  DIAGNOSTIC IMPRESSIONS: Multivessel CAD  Normal LV systolic function  DIAGNOSTIC RECOMMENDATIONS: Recommend evaluation for CABG with LIMA to LAD  and grafts to Diagonal 1, OM1, OM2 and if possible RPDA.  Prepared and signed by  Escobar Solis MD  Signed 2018 11:21:27    < end of copied text >        Neurology: A&Ox3, nonfocal, CRUZ x 4  Respiratory: CTA B/L  CV: RRR, S1S2,   Abdominal: Soft, NT, ND +BS  Extremities: No edema, + peripheral pulses   Right radial cath site without hematoma or drainage          Pt has AICD/PPm [ ] Yes  [x ] No             Brand Name:  Pre-op Beta Blocker ordered within 24 hrs of surgery?  [x ] Yes  [ ] No  If not, Why?  Type & Cross  [x ] Yes  [ ] No  NPO after Midnight [x ] Yes  [ ] No  Pre-op ABX ordered, to be taped on chart:  [ x] Yes  [ ] No   ( Vanco only if Anaphylaxis, or MRSA)  Consent obtained  [ ] Yes  [x ] No

## 2018-07-08 NOTE — PROGRESS NOTE ADULT - SUBJECTIVE AND OBJECTIVE BOX
Patient: SIMRAN VELASCO [id number] 48y Male                           Internal Medicine Hospitalist Progress Note  Chief Complaint: Patient is a 48y old  Male who presents with a chief complaint of Chest Pain (2018 23:34)    HPI:  48 years old male with PMH of DM, HTN and HLD comes with Chest Pain. As per patient, he has intermittent retrosternal chest pain for a month. It is mostly on exertion - dull and achy, non radiating, relieves with rest and is associated with shortness of breath and dizziness. Today, he went to his PMD for routine follow up and was found to have new EKG changes so he was sent to ER for further evaluation.  Denies fever, cough, palpitations, nausea, vomiting, diaphoresis, sick contacts or recent travel. (2018 23:34)    Interim History:   s/p cath showing multivessel CAD per cardiology.  Pt denies chest pain / palpitations.  no SOB.  TTE showed EF 55-60%.  Carotid dopplers unremarkable.     ____________________PHYSICAL EXAM:  Vitals reviewed as indicated below  GENERAL:  NAD Alert and Oriented x 3   HEENT: NCAT  CARDIOVASCULAR:  S1, S2  LUNGS: CTAB  ABDOMEN:  soft, (-) tenderness, (-) distension, (+) bowel sounds, (-) guarding, (-) rebound (-) rigidity  EXTREMITIES:  no cyanosis / clubbing / edema.   ____________________    VITALS:  Vital Signs Last 24 Hrs  T(C): 36.6 (2018 10:28), Max: 36.8 (2018 22:25)  T(F): 97.9 (2018 10:28), Max: 98.3 (2018 22:25)  HR: 65 (2018 10:28) (61 - 81)  BP: 122/83 (2018 10:28) (122/83 - 135/88)  BP(mean): --  RR: 16 (2018 10:28) (16 - 18)  SpO2: 100% (2018 10:28) (97% - 100%) Daily     Daily   CAPILLARY BLOOD GLUCOSE      POCT Blood Glucose.: 185 mg/dL (2018 11:57)  POCT Blood Glucose.: 96 mg/dL (2018 08:06)  POCT Blood Glucose.: 157 mg/dL (2018 22:19)  POCT Blood Glucose.: 88 mg/dL (2018 17:18)    I&O's Summary    2018 07:01  -  2018 07:00  --------------------------------------------------------  IN: 240 mL / OUT: 0 mL / NET: 240 mL        LABS:                        13.5   5.9   )-----------( 157      ( 2018 06:20 )             40.2     07-07    141  |  104  |  16.0  ----------------------------<  111  4.0   |  26.0  |  0.89    Ca    8.7      2018 06:20  Phos  3.3     07-07  Mg     2.0     07-07    TPro  6.8  /  Alb  3.9  /  TBili  0.4  /  DBili  x   /  AST  19  /  ALT  19  /  AlkPhos  43  07-07    PT/INR - ( 2018 06:05 )   PT: 11.5 sec;   INR: 1.04 ratio         PTT - ( 2018 06:05 )  PTT:33.3 sec  LIVER FUNCTIONS - ( 2018 06:20 )  Alb: 3.9 g/dL / Pro: 6.8 g/dL / ALK PHOS: 43 U/L / ALT: 19 U/L / AST: 19 U/L / GGT: x           Urinalysis Basic - ( 2018 11:56 )    Color: Yellow / Appearance: Clear / S.010 / pH: x  Gluc: x / Ketone: Negative  / Bili: Negative / Urobili: Negative mg/dL   Blood: x / Protein: Negative mg/dL / Nitrite: Negative   Leuk Esterase: Negative / RBC: 0-2 /HPF / WBC 0-2   Sq Epi: x / Non Sq Epi: Occasional / Bacteria: Occasional            MEDICATIONS:  aspirin 325 milliGRAM(s) Oral daily  atorvastatin 40 milliGRAM(s) Oral at bedtime  chlorhexidine 0.12% Liquid 15 milliLiter(s) Swish and Spit two times a day  chlorhexidine 4% Liquid 1 Application(s) Topical two times a day  dextrose 40% Gel 15 Gram(s) Oral once PRN  dextrose 5%. 1000 milliLiter(s) IV Continuous <Continuous>  dextrose 50% Injectable 12.5 Gram(s) IV Push once  dextrose 50% Injectable 25 Gram(s) IV Push once  dextrose 50% Injectable 25 Gram(s) IV Push once  enoxaparin Injectable 40 milliGRAM(s) SubCutaneous daily  glucagon  Injectable 1 milliGRAM(s) IntraMuscular once PRN  hydrALAZINE 25 milliGRAM(s) Oral two times a day  insulin glargine Injectable (LANTUS) 16 Unit(s) SubCutaneous at bedtime  insulin lispro (HumaLOG) corrective regimen sliding scale   SubCutaneous Before meals and at bedtime  metoprolol tartrate 25 milliGRAM(s) Oral two times a day  nitroglycerin     SubLingual 0.4 milliGRAM(s) SubLingual every 5 minutes PRN  pantoprazole    Tablet 40 milliGRAM(s) Oral before breakfast  sodium chloride 0.9%. 200 milliLiter(s) IV Continuous <Continuous>

## 2018-07-08 NOTE — PROGRESS NOTE ADULT - SUBJECTIVE AND OBJECTIVE BOX
[facility]                                                        Office: [address] [address]                                                       Telephone: [phone]. Fax:[phone]                                                                             PROGRESS NOTE   Reason for follow up: Severe Multi vessel CAD                            Overnight: No new events.   Update: Patient agreed to OHS    Subjective: " I feel nervous about the surgery"   Complains of: No complaints at this time  Review of symptoms: Cardiac:  No chest pain. No dyspnea. No palpitations.  Respiratory: no cough. No dyspnea  Gastrointestinal: No diarrhea. No abdominal pain. No bleeding.     Past medical history: No updates.   Chronic conditions:  Hypertension: controlled. CHF: Stable. CAD: Stable ischemic heart disease. DM: Stable;    	  Vitals:  T(C): 36.6 (07-08-18 @ 10:28), Max: 36.8 (07-07-18 @ 22:25)  HR: 65 (07-08-18 @ 10:28) (61 - 81)  BP: 122/83 (07-08-18 @ 10:28) (122/83 - 135/88)  RR: 16 (07-08-18 @ 10:28) (16 - 18)  SpO2: 100% (07-08-18 @ 10:28) (97% - 100%)  Wt(kg): --  I&O's Summary    07 Jul 2018 07:01  -  08 Jul 2018 07:00  --------------------------------------------------------  IN: 240 mL / OUT: 0 mL / NET: 240 mL      Weight (kg): 61.2 (07-07 @ 05:55)    PHYSICAL EXAM:  Appearance: Comfortable. No acute distress  HEENT:  Head and neck: Atraumatic. Normocephalic.  Normal oral mucosa, PERRL, Neck is supple. No JVD, No carotid bruit.   Neurologic: A & O x 3, no focal deficits. EOMI , Cranial nerves are intact.  Lymphatic: No cervical lymphadenopathy  Cardiovascular: Normal S1 S2, No murmur, rubs/gallops. No JVD, No edema  Respiratory: Lungs clear to auscultation  Gastrointestinal:  Soft, Non-tender, + BS  Lower Extremities: No edema  Psychiatry: Patient is calm. No agitation. Mood & affect appropriate  Skin: No rashes/ ecchymoses/cyanosis/ulcers visualized on the face, hands or feet.    CURRENT MEDICATIONS:  hydrALAZINE 25 milliGRAM(s) Oral two times a day  metoprolol tartrate 25 milliGRAM(s) Oral two times a day  nitroglycerin     SubLingual 0.4 milliGRAM(s) SubLingual every 5 minutes PRN    aspirin  pantoprazole    Tablet  atorvastatin  dextrose 50% Injectable  dextrose 50% Injectable  dextrose 50% Injectable  insulin lispro (HumaLOG) corrective regimen sliding scale  chlorhexidine 0.12% Liquid  chlorhexidine 4% Liquid  dextrose 5%.  enoxaparin Injectable  sodium chloride 0.9%.      LABS:	 	  CARDIAC MARKERS ( 07 Jul 2018 06:05 )  x     / x     / x     / x     / x      p-BNP 07 Jul 2018 06:05: 103 pg/mL, CARDIAC MARKERS ( 06 Jul 2018 08:15 )  x     / 0.05 ng/mL / 212 U/L / x     / 3.1 ng/mL  p-BNP 06 Jul 2018 08:15: x    , CARDIAC MARKERS ( 05 Jul 2018 21:48 )  x     / 0.06 ng/mL / 422 U/L / x     / 3.9 ng/mL  p-BNP 05 Jul 2018 21:48: 59 pg/mL                         13.5   5.9   )-----------( 157      ( 07 Jul 2018 06:20 )             40.2     07-07    141  |  104  |  16.0  ----------------------------<  111  4.0   |  26.0  |  0.89    Ca    8.7      07 Jul 2018 06:20  Phos  3.3     07-07  Mg     2.0     07-07    TPro  6.8  /  Alb  3.9  /  TBili  0.4  /  DBili  x   /  AST  19  /  ALT  19  /  AlkPhos  43  07-07    proBNP: Serum Pro-Brain Natriuretic Peptide: 103 pg/mL (07-07 @ 06:05)  Serum Pro-Brain Natriuretic Peptide: 59 pg/mL (07-05 @ 21:48)    Lipid Profile:   HgA1c: Hemoglobin A1C, Whole Blood: 7.4 %  TSH: Thyroid Stimulating Hormone, Serum: 1.65 uIU/mL      TELEMETRY: Reviewed  no events  ECG:  Reviewed by me. 	    DIAGNOSTIC TESTING: no new testing today  [ ] Echocardiogram:   [ ]  Catheterization:  [ ] Stress Test:    OTHER:

## 2018-07-09 ENCOUNTER — APPOINTMENT (OUTPATIENT)
Dept: CARDIOTHORACIC SURGERY | Facility: HOSPITAL | Age: 48
End: 2018-07-09
Payer: MEDICAID

## 2018-07-09 LAB
ALBUMIN SERPL ELPH-MCNC: 3.1 G/DL — LOW (ref 3.3–5.2)
ALP SERPL-CCNC: 28 U/L — LOW (ref 40–120)
ALT FLD-CCNC: 25 U/L — SIGNIFICANT CHANGE UP
ANION GAP SERPL CALC-SCNC: 12 MMOL/L — SIGNIFICANT CHANGE UP (ref 5–17)
APTT BLD: 27.9 SEC — SIGNIFICANT CHANGE UP (ref 27.5–37.4)
AST SERPL-CCNC: 52 U/L — HIGH
BASOPHILS # BLD AUTO: 0 K/UL — SIGNIFICANT CHANGE UP (ref 0–0.2)
BASOPHILS NFR BLD AUTO: 0.1 % — SIGNIFICANT CHANGE UP (ref 0–2)
BILIRUB SERPL-MCNC: 1.1 MG/DL — SIGNIFICANT CHANGE UP (ref 0.4–2)
BUN SERPL-MCNC: 11 MG/DL — SIGNIFICANT CHANGE UP (ref 8–20)
CALCIUM SERPL-MCNC: 7.9 MG/DL — LOW (ref 8.6–10.2)
CHLORIDE SERPL-SCNC: 105 MMOL/L — SIGNIFICANT CHANGE UP (ref 98–107)
CK MB CFR SERPL CALC: 36.8 NG/ML — HIGH (ref 0–6.7)
CK SERPL-CCNC: 489 U/L — HIGH (ref 30–200)
CO2 SERPL-SCNC: 23 MMOL/L — SIGNIFICANT CHANGE UP (ref 22–29)
CREAT SERPL-MCNC: 0.76 MG/DL — SIGNIFICANT CHANGE UP (ref 0.5–1.3)
EOSINOPHIL # BLD AUTO: 0.1 K/UL — SIGNIFICANT CHANGE UP (ref 0–0.5)
EOSINOPHIL NFR BLD AUTO: 0.5 % — SIGNIFICANT CHANGE UP (ref 0–5)
GAS PNL BLDA: SIGNIFICANT CHANGE UP
GLUCOSE BLDC GLUCOMTR-MCNC: 130 MG/DL — HIGH (ref 70–99)
GLUCOSE BLDC GLUCOMTR-MCNC: 136 MG/DL — HIGH (ref 70–99)
GLUCOSE BLDC GLUCOMTR-MCNC: 137 MG/DL — HIGH (ref 70–99)
GLUCOSE BLDC GLUCOMTR-MCNC: 139 MG/DL — HIGH (ref 70–99)
GLUCOSE BLDC GLUCOMTR-MCNC: 139 MG/DL — HIGH (ref 70–99)
GLUCOSE SERPL-MCNC: 157 MG/DL — HIGH (ref 70–115)
HCT VFR BLD CALC: 31.4 % — LOW (ref 42–52)
HGB BLD-MCNC: 10.7 G/DL — LOW (ref 14–18)
INR BLD: 1.46 RATIO — HIGH (ref 0.88–1.16)
LYMPHOCYTES # BLD AUTO: 2.7 K/UL — SIGNIFICANT CHANGE UP (ref 1–4.8)
LYMPHOCYTES # BLD AUTO: 20.7 % — SIGNIFICANT CHANGE UP (ref 20–55)
MAGNESIUM SERPL-MCNC: 2.2 MG/DL — SIGNIFICANT CHANGE UP (ref 1.6–2.6)
MCHC RBC-ENTMCNC: 28.2 PG — SIGNIFICANT CHANGE UP (ref 27–31)
MCHC RBC-ENTMCNC: 34.1 G/DL — SIGNIFICANT CHANGE UP (ref 32–36)
MCV RBC AUTO: 82.8 FL — SIGNIFICANT CHANGE UP (ref 80–94)
MONOCYTES # BLD AUTO: 1.1 K/UL — HIGH (ref 0–0.8)
MONOCYTES NFR BLD AUTO: 8.5 % — SIGNIFICANT CHANGE UP (ref 3–10)
NEUTROPHILS # BLD AUTO: 9.1 K/UL — HIGH (ref 1.8–8)
NEUTROPHILS NFR BLD AUTO: 69.7 % — SIGNIFICANT CHANGE UP (ref 37–73)
PLATELET # BLD AUTO: 105 K/UL — LOW (ref 150–400)
POTASSIUM SERPL-MCNC: 3.9 MMOL/L — SIGNIFICANT CHANGE UP (ref 3.5–5.3)
POTASSIUM SERPL-SCNC: 3.9 MMOL/L — SIGNIFICANT CHANGE UP (ref 3.5–5.3)
PROT SERPL-MCNC: 4.9 G/DL — LOW (ref 6.6–8.7)
PROTHROM AB SERPL-ACNC: 16.2 SEC — HIGH (ref 9.8–12.7)
RBC # BLD: 3.79 M/UL — LOW (ref 4.6–6.2)
RBC # FLD: 13.2 % — SIGNIFICANT CHANGE UP (ref 11–15.6)
SODIUM SERPL-SCNC: 140 MMOL/L — SIGNIFICANT CHANGE UP (ref 135–145)
TROPONIN T SERPL-MCNC: 0.7 NG/ML — HIGH (ref 0–0.06)
WBC # BLD: 13.1 K/UL — HIGH (ref 4.8–10.8)
WBC # FLD AUTO: 13.1 K/UL — HIGH (ref 4.8–10.8)

## 2018-07-09 PROCEDURE — 33519 CABG ARTERY-VEIN THREE: CPT | Mod: AS

## 2018-07-09 PROCEDURE — 33508 ENDOSCOPIC VEIN HARVEST: CPT

## 2018-07-09 PROCEDURE — 33519 CABG ARTERY-VEIN THREE: CPT

## 2018-07-09 PROCEDURE — 71045 X-RAY EXAM CHEST 1 VIEW: CPT | Mod: 26

## 2018-07-09 PROCEDURE — 33508 ENDOSCOPIC VEIN HARVEST: CPT | Mod: AS

## 2018-07-09 PROCEDURE — 33533 CABG ARTERIAL SINGLE: CPT | Mod: AS

## 2018-07-09 PROCEDURE — 33533 CABG ARTERIAL SINGLE: CPT

## 2018-07-09 PROCEDURE — 93010 ELECTROCARDIOGRAM REPORT: CPT

## 2018-07-09 RX ORDER — FENTANYL CITRATE 50 UG/ML
25 INJECTION INTRAVENOUS ONCE
Qty: 0 | Refills: 0 | Status: DISCONTINUED | OUTPATIENT
Start: 2018-07-09 | End: 2018-07-09

## 2018-07-09 RX ORDER — POTASSIUM CHLORIDE 20 MEQ
10 PACKET (EA) ORAL
Qty: 0 | Refills: 0 | Status: COMPLETED | OUTPATIENT
Start: 2018-07-09 | End: 2018-07-09

## 2018-07-09 RX ORDER — POTASSIUM CHLORIDE 20 MEQ
10 PACKET (EA) ORAL
Qty: 0 | Refills: 0 | Status: DISCONTINUED | OUTPATIENT
Start: 2018-07-09 | End: 2018-07-10

## 2018-07-09 RX ORDER — FENTANYL CITRATE 50 UG/ML
12.5 INJECTION INTRAVENOUS ONCE
Qty: 0 | Refills: 0 | Status: DISCONTINUED | OUTPATIENT
Start: 2018-07-09 | End: 2018-07-09

## 2018-07-09 RX ORDER — ASPIRIN/CALCIUM CARB/MAGNESIUM 324 MG
325 TABLET ORAL DAILY
Qty: 0 | Refills: 0 | Status: DISCONTINUED | OUTPATIENT
Start: 2018-07-10 | End: 2018-07-14

## 2018-07-09 RX ORDER — ASPIRIN/CALCIUM CARB/MAGNESIUM 324 MG
325 TABLET ORAL ONCE
Qty: 0 | Refills: 0 | Status: COMPLETED | OUTPATIENT
Start: 2018-07-09 | End: 2018-07-09

## 2018-07-09 RX ORDER — PANTOPRAZOLE SODIUM 20 MG/1
40 TABLET, DELAYED RELEASE ORAL DAILY
Qty: 0 | Refills: 0 | Status: DISCONTINUED | OUTPATIENT
Start: 2018-07-10 | End: 2018-07-14

## 2018-07-09 RX ORDER — ATORVASTATIN CALCIUM 80 MG/1
40 TABLET, FILM COATED ORAL AT BEDTIME
Qty: 0 | Refills: 0 | Status: DISCONTINUED | OUTPATIENT
Start: 2018-07-09 | End: 2018-07-14

## 2018-07-09 RX ORDER — CLOPIDOGREL BISULFATE 75 MG/1
75 TABLET, FILM COATED ORAL DAILY
Qty: 0 | Refills: 0 | Status: DISCONTINUED | OUTPATIENT
Start: 2018-07-10 | End: 2018-07-14

## 2018-07-09 RX ORDER — DEXTROSE 50 % IN WATER 50 %
25 SYRINGE (ML) INTRAVENOUS
Qty: 0 | Refills: 0 | Status: DISCONTINUED | OUTPATIENT
Start: 2018-07-09 | End: 2018-07-11

## 2018-07-09 RX ORDER — PANTOPRAZOLE SODIUM 20 MG/1
40 TABLET, DELAYED RELEASE ORAL ONCE
Qty: 0 | Refills: 0 | Status: COMPLETED | OUTPATIENT
Start: 2018-07-09 | End: 2018-07-09

## 2018-07-09 RX ORDER — DOCUSATE SODIUM 100 MG
100 CAPSULE ORAL THREE TIMES A DAY
Qty: 0 | Refills: 0 | Status: DISCONTINUED | OUTPATIENT
Start: 2018-07-09 | End: 2018-07-14

## 2018-07-09 RX ORDER — ALBUMIN HUMAN 25 %
250 VIAL (ML) INTRAVENOUS ONCE
Qty: 0 | Refills: 0 | Status: COMPLETED | OUTPATIENT
Start: 2018-07-09 | End: 2018-07-09

## 2018-07-09 RX ORDER — SODIUM CHLORIDE 9 MG/ML
500 INJECTION, SOLUTION INTRAVENOUS ONCE
Qty: 0 | Refills: 0 | Status: COMPLETED | OUTPATIENT
Start: 2018-07-09 | End: 2018-07-09

## 2018-07-09 RX ORDER — CALCIUM GLUCONATE 100 MG/ML
2 VIAL (ML) INTRAVENOUS ONCE
Qty: 0 | Refills: 0 | Status: COMPLETED | OUTPATIENT
Start: 2018-07-09 | End: 2018-07-09

## 2018-07-09 RX ORDER — DEXTROSE 50 % IN WATER 50 %
50 SYRINGE (ML) INTRAVENOUS
Qty: 0 | Refills: 0 | Status: DISCONTINUED | OUTPATIENT
Start: 2018-07-09 | End: 2018-07-11

## 2018-07-09 RX ORDER — CLOPIDOGREL BISULFATE 75 MG/1
75 TABLET, FILM COATED ORAL DAILY
Qty: 0 | Refills: 0 | Status: DISCONTINUED | OUTPATIENT
Start: 2018-07-09 | End: 2018-07-09

## 2018-07-09 RX ORDER — INSULIN HUMAN 100 [IU]/ML
1 INJECTION, SOLUTION SUBCUTANEOUS
Qty: 50 | Refills: 0 | Status: DISCONTINUED | OUTPATIENT
Start: 2018-07-09 | End: 2018-07-11

## 2018-07-09 RX ORDER — SODIUM CHLORIDE 9 MG/ML
200 INJECTION INTRAMUSCULAR; INTRAVENOUS; SUBCUTANEOUS
Qty: 0 | Refills: 0 | Status: DISCONTINUED | OUTPATIENT
Start: 2018-07-09 | End: 2018-07-11

## 2018-07-09 RX ORDER — MEPERIDINE HYDROCHLORIDE 50 MG/ML
25 INJECTION INTRAMUSCULAR; INTRAVENOUS; SUBCUTANEOUS ONCE
Qty: 0 | Refills: 0 | Status: DISCONTINUED | OUTPATIENT
Start: 2018-07-09 | End: 2018-07-10

## 2018-07-09 RX ORDER — CEFUROXIME AXETIL 250 MG
1500 TABLET ORAL EVERY 8 HOURS
Qty: 0 | Refills: 0 | Status: COMPLETED | OUTPATIENT
Start: 2018-07-09 | End: 2018-07-10

## 2018-07-09 RX ORDER — CHLORHEXIDINE GLUCONATE 213 G/1000ML
5 SOLUTION TOPICAL EVERY 4 HOURS
Qty: 0 | Refills: 0 | Status: DISCONTINUED | OUTPATIENT
Start: 2018-07-09 | End: 2018-07-10

## 2018-07-09 RX ORDER — NOREPINEPHRINE BITARTRATE/D5W 8 MG/250ML
0.05 PLASTIC BAG, INJECTION (ML) INTRAVENOUS
Qty: 8 | Refills: 0 | Status: DISCONTINUED | OUTPATIENT
Start: 2018-07-09 | End: 2018-07-10

## 2018-07-09 RX ORDER — SODIUM CHLORIDE 9 MG/ML
1000 INJECTION INTRAMUSCULAR; INTRAVENOUS; SUBCUTANEOUS
Qty: 0 | Refills: 0 | Status: DISCONTINUED | OUTPATIENT
Start: 2018-07-09 | End: 2018-07-11

## 2018-07-09 RX ORDER — PROPOFOL 10 MG/ML
27.23 INJECTION, EMULSION INTRAVENOUS
Qty: 1000 | Refills: 0 | Status: DISCONTINUED | OUTPATIENT
Start: 2018-07-09 | End: 2018-07-09

## 2018-07-09 RX ORDER — CLOPIDOGREL BISULFATE 75 MG/1
75 TABLET, FILM COATED ORAL ONCE
Qty: 0 | Refills: 0 | Status: COMPLETED | OUTPATIENT
Start: 2018-07-09 | End: 2018-07-09

## 2018-07-09 RX ADMIN — FENTANYL CITRATE 12.5 MICROGRAM(S): 50 INJECTION INTRAVENOUS at 22:00

## 2018-07-09 RX ADMIN — FENTANYL CITRATE 12.5 MICROGRAM(S): 50 INJECTION INTRAVENOUS at 21:45

## 2018-07-09 RX ADMIN — PANTOPRAZOLE SODIUM 40 MILLIGRAM(S): 20 TABLET, DELAYED RELEASE ORAL at 16:46

## 2018-07-09 RX ADMIN — ATORVASTATIN CALCIUM 40 MILLIGRAM(S): 80 TABLET, FILM COATED ORAL at 21:00

## 2018-07-09 RX ADMIN — Medication 125 MILLILITER(S): at 22:56

## 2018-07-09 RX ADMIN — CHLORHEXIDINE GLUCONATE 5 MILLILITER(S): 213 SOLUTION TOPICAL at 17:47

## 2018-07-09 RX ADMIN — Medication 325 MILLIGRAM(S): at 21:00

## 2018-07-09 RX ADMIN — FENTANYL CITRATE 25 MICROGRAM(S): 50 INJECTION INTRAVENOUS at 22:45

## 2018-07-09 RX ADMIN — Medication 25 MILLIGRAM(S): at 06:24

## 2018-07-09 RX ADMIN — SODIUM CHLORIDE 2000 MILLILITER(S): 9 INJECTION, SOLUTION INTRAVENOUS at 15:37

## 2018-07-09 RX ADMIN — Medication 100 MILLIGRAM(S): at 23:06

## 2018-07-09 RX ADMIN — Medication 500 MILLILITER(S): at 16:47

## 2018-07-09 RX ADMIN — Medication 200 GRAM(S): at 23:06

## 2018-07-09 RX ADMIN — Medication 100 MILLIGRAM(S): at 16:32

## 2018-07-09 RX ADMIN — CHLORHEXIDINE GLUCONATE 15 MILLILITER(S): 213 SOLUTION TOPICAL at 06:23

## 2018-07-09 RX ADMIN — FENTANYL CITRATE 25 MICROGRAM(S): 50 INJECTION INTRAVENOUS at 22:30

## 2018-07-09 RX ADMIN — Medication 25 MILLIGRAM(S): at 06:23

## 2018-07-09 RX ADMIN — CLOPIDOGREL BISULFATE 75 MILLIGRAM(S): 75 TABLET, FILM COATED ORAL at 21:00

## 2018-07-09 RX ADMIN — Medication 100 MILLIEQUIVALENT(S): at 18:33

## 2018-07-09 RX ADMIN — Medication 100 MILLIEQUIVALENT(S): at 15:34

## 2018-07-09 RX ADMIN — Medication 100 MILLIEQUIVALENT(S): at 16:08

## 2018-07-09 RX ADMIN — Medication 100 MILLIEQUIVALENT(S): at 16:33

## 2018-07-09 RX ADMIN — CHLORHEXIDINE GLUCONATE 1 APPLICATION(S): 213 SOLUTION TOPICAL at 06:23

## 2018-07-09 RX ADMIN — Medication 125 MILLILITER(S): at 21:32

## 2018-07-09 RX ADMIN — PANTOPRAZOLE SODIUM 40 MILLIGRAM(S): 20 TABLET, DELAYED RELEASE ORAL at 06:24

## 2018-07-09 RX ADMIN — CHLORHEXIDINE GLUCONATE 5 MILLILITER(S): 213 SOLUTION TOPICAL at 21:01

## 2018-07-09 RX ADMIN — SODIUM CHLORIDE 1000 MILLILITER(S): 9 INJECTION, SOLUTION INTRAVENOUS at 16:46

## 2018-07-09 NOTE — BRIEF OPERATIVE NOTE - OPERATION/FINDINGS
coronary artery disease    ** Cross clamp times 138 coronary artery disease  Diffuse coronary disease with small and calcified targets.  ** Cross clamp times 138

## 2018-07-09 NOTE — PRE-OP CHECKLIST - SELECT TESTS ORDERED
POCT Blood Glucose/CXR/INR/Type and Cross/EKG/CBC/Spirometry/BMP/CMP/PT/PTT/Type and Screen/Urinalysis

## 2018-07-09 NOTE — BRIEF OPERATIVE NOTE - COMMENTS
Invasive lines placed intra op: right IJ double lumen with slick, r radial willy   Bellamy placed intra op     gtts: levophed, insulin to CICU   intubated

## 2018-07-09 NOTE — BRIEF OPERATIVE NOTE - PROCEDURE
<<-----Click on this checkbox to enter Procedure CABG  07/09/2018    Active  MHOERNING  Endoscopic harvest of vein  07/09/2018  EVH of right greater saphenous vein  Active  MHOERNING

## 2018-07-09 NOTE — BRIEF OPERATIVE NOTE - PRE-OP DX
Coronary artery disease involving native coronary artery of native heart without angina pectoris  07/09/2018    Active  Reena Butler

## 2018-07-10 DIAGNOSIS — Z29.9 ENCOUNTER FOR PROPHYLACTIC MEASURES, UNSPECIFIED: ICD-10-CM

## 2018-07-10 DIAGNOSIS — R79.89 OTHER SPECIFIED ABNORMAL FINDINGS OF BLOOD CHEMISTRY: ICD-10-CM

## 2018-07-10 DIAGNOSIS — Z95.1 PRESENCE OF AORTOCORONARY BYPASS GRAFT: ICD-10-CM

## 2018-07-10 DIAGNOSIS — E78.00 PURE HYPERCHOLESTEROLEMIA, UNSPECIFIED: ICD-10-CM

## 2018-07-10 DIAGNOSIS — D62 ACUTE POSTHEMORRHAGIC ANEMIA: ICD-10-CM

## 2018-07-10 LAB
ANION GAP SERPL CALC-SCNC: 14 MMOL/L — SIGNIFICANT CHANGE UP (ref 5–17)
ANION GAP SERPL CALC-SCNC: 16 MMOL/L — SIGNIFICANT CHANGE UP (ref 5–17)
APTT BLD: 30.6 SEC — SIGNIFICANT CHANGE UP (ref 27.5–37.4)
BUN SERPL-MCNC: 13 MG/DL — SIGNIFICANT CHANGE UP (ref 8–20)
BUN SERPL-MCNC: 15 MG/DL — SIGNIFICANT CHANGE UP (ref 8–20)
CALCIUM SERPL-MCNC: 7.6 MG/DL — LOW (ref 8.6–10.2)
CALCIUM SERPL-MCNC: 8.2 MG/DL — LOW (ref 8.6–10.2)
CHLORIDE SERPL-SCNC: 103 MMOL/L — SIGNIFICANT CHANGE UP (ref 98–107)
CHLORIDE SERPL-SCNC: 99 MMOL/L — SIGNIFICANT CHANGE UP (ref 98–107)
CK MB CFR SERPL CALC: 29.2 NG/ML — HIGH (ref 0–6.7)
CK SERPL-CCNC: 703 U/L — HIGH (ref 30–200)
CO2 SERPL-SCNC: 20 MMOL/L — LOW (ref 22–29)
CO2 SERPL-SCNC: 23 MMOL/L — SIGNIFICANT CHANGE UP (ref 22–29)
CREAT SERPL-MCNC: 0.84 MG/DL — SIGNIFICANT CHANGE UP (ref 0.5–1.3)
CREAT SERPL-MCNC: 1.06 MG/DL — SIGNIFICANT CHANGE UP (ref 0.5–1.3)
EOSINOPHIL # BLD AUTO: 0 K/UL — SIGNIFICANT CHANGE UP (ref 0–0.5)
EOSINOPHIL NFR BLD AUTO: 0 % — SIGNIFICANT CHANGE UP (ref 0–5)
GAS PNL BLDA: SIGNIFICANT CHANGE UP
GAS PNL BLDA: SIGNIFICANT CHANGE UP
GLUCOSE BLDC GLUCOMTR-MCNC: 101 MG/DL — HIGH (ref 70–99)
GLUCOSE BLDC GLUCOMTR-MCNC: 106 MG/DL — HIGH (ref 70–99)
GLUCOSE BLDC GLUCOMTR-MCNC: 109 MG/DL — HIGH (ref 70–99)
GLUCOSE BLDC GLUCOMTR-MCNC: 125 MG/DL — HIGH (ref 70–99)
GLUCOSE BLDC GLUCOMTR-MCNC: 132 MG/DL — HIGH (ref 70–99)
GLUCOSE BLDC GLUCOMTR-MCNC: 139 MG/DL — HIGH (ref 70–99)
GLUCOSE BLDC GLUCOMTR-MCNC: 140 MG/DL — HIGH (ref 70–99)
GLUCOSE BLDC GLUCOMTR-MCNC: 145 MG/DL — HIGH (ref 70–99)
GLUCOSE BLDC GLUCOMTR-MCNC: 154 MG/DL — HIGH (ref 70–99)
GLUCOSE BLDC GLUCOMTR-MCNC: 168 MG/DL — HIGH (ref 70–99)
GLUCOSE BLDC GLUCOMTR-MCNC: 168 MG/DL — HIGH (ref 70–99)
GLUCOSE BLDC GLUCOMTR-MCNC: 170 MG/DL — HIGH (ref 70–99)
GLUCOSE BLDC GLUCOMTR-MCNC: 211 MG/DL — HIGH (ref 70–99)
GLUCOSE BLDC GLUCOMTR-MCNC: 223 MG/DL — HIGH (ref 70–99)
GLUCOSE BLDC GLUCOMTR-MCNC: 226 MG/DL — HIGH (ref 70–99)
GLUCOSE BLDC GLUCOMTR-MCNC: 249 MG/DL — HIGH (ref 70–99)
GLUCOSE BLDC GLUCOMTR-MCNC: 270 MG/DL — HIGH (ref 70–99)
GLUCOSE BLDC GLUCOMTR-MCNC: 292 MG/DL — HIGH (ref 70–99)
GLUCOSE SERPL-MCNC: 127 MG/DL — HIGH (ref 70–115)
GLUCOSE SERPL-MCNC: 277 MG/DL — HIGH (ref 70–115)
HCT VFR BLD CALC: 23 % — LOW (ref 42–52)
HCT VFR BLD CALC: 24 % — LOW (ref 42–52)
HGB BLD-MCNC: 7.8 G/DL — LOW (ref 14–18)
HGB BLD-MCNC: 8.1 G/DL — LOW (ref 14–18)
INR BLD: 1.31 RATIO — HIGH (ref 0.88–1.16)
LYMPHOCYTES # BLD AUTO: 0.7 K/UL — LOW (ref 1–4.8)
LYMPHOCYTES # BLD AUTO: 7.8 % — LOW (ref 20–55)
MAGNESIUM SERPL-MCNC: 1.9 MG/DL — SIGNIFICANT CHANGE UP (ref 1.6–2.6)
MAGNESIUM SERPL-MCNC: 2.2 MG/DL — SIGNIFICANT CHANGE UP (ref 1.6–2.6)
MCHC RBC-ENTMCNC: 28.1 PG — SIGNIFICANT CHANGE UP (ref 27–31)
MCHC RBC-ENTMCNC: 28.1 PG — SIGNIFICANT CHANGE UP (ref 27–31)
MCHC RBC-ENTMCNC: 33.8 G/DL — SIGNIFICANT CHANGE UP (ref 32–36)
MCHC RBC-ENTMCNC: 33.9 G/DL — SIGNIFICANT CHANGE UP (ref 32–36)
MCV RBC AUTO: 82.7 FL — SIGNIFICANT CHANGE UP (ref 80–94)
MCV RBC AUTO: 83.3 FL — SIGNIFICANT CHANGE UP (ref 80–94)
MONOCYTES # BLD AUTO: 0.7 K/UL — SIGNIFICANT CHANGE UP (ref 0–0.8)
MONOCYTES NFR BLD AUTO: 8.3 % — SIGNIFICANT CHANGE UP (ref 3–10)
NEUTROPHILS # BLD AUTO: 7.2 K/UL — SIGNIFICANT CHANGE UP (ref 1.8–8)
NEUTROPHILS NFR BLD AUTO: 83.7 % — HIGH (ref 37–73)
PLATELET # BLD AUTO: 80 K/UL — LOW (ref 150–400)
PLATELET # BLD AUTO: 89 K/UL — LOW (ref 150–400)
POTASSIUM SERPL-MCNC: 4.4 MMOL/L — SIGNIFICANT CHANGE UP (ref 3.5–5.3)
POTASSIUM SERPL-MCNC: 4.8 MMOL/L — SIGNIFICANT CHANGE UP (ref 3.5–5.3)
POTASSIUM SERPL-SCNC: 4.4 MMOL/L — SIGNIFICANT CHANGE UP (ref 3.5–5.3)
POTASSIUM SERPL-SCNC: 4.8 MMOL/L — SIGNIFICANT CHANGE UP (ref 3.5–5.3)
PROTHROM AB SERPL-ACNC: 14.5 SEC — HIGH (ref 9.8–12.7)
RBC # BLD: 2.78 M/UL — LOW (ref 4.6–6.2)
RBC # BLD: 2.88 M/UL — LOW (ref 4.6–6.2)
RBC # FLD: 13.3 % — SIGNIFICANT CHANGE UP (ref 11–15.6)
RBC # FLD: 13.8 % — SIGNIFICANT CHANGE UP (ref 11–15.6)
SODIUM SERPL-SCNC: 135 MMOL/L — SIGNIFICANT CHANGE UP (ref 135–145)
SODIUM SERPL-SCNC: 140 MMOL/L — SIGNIFICANT CHANGE UP (ref 135–145)
TROPONIN T SERPL-MCNC: 0.62 NG/ML — HIGH (ref 0–0.06)
WBC # BLD: 8.6 K/UL — SIGNIFICANT CHANGE UP (ref 4.8–10.8)
WBC # BLD: 9.9 K/UL — SIGNIFICANT CHANGE UP (ref 4.8–10.8)
WBC # FLD AUTO: 8.6 K/UL — SIGNIFICANT CHANGE UP (ref 4.8–10.8)
WBC # FLD AUTO: 9.9 K/UL — SIGNIFICANT CHANGE UP (ref 4.8–10.8)

## 2018-07-10 PROCEDURE — 71045 X-RAY EXAM CHEST 1 VIEW: CPT | Mod: 26

## 2018-07-10 PROCEDURE — 93010 ELECTROCARDIOGRAM REPORT: CPT

## 2018-07-10 RX ORDER — FENTANYL CITRATE 50 UG/ML
25 INJECTION INTRAVENOUS ONCE
Qty: 0 | Refills: 0 | Status: DISCONTINUED | OUTPATIENT
Start: 2018-07-10 | End: 2018-07-10

## 2018-07-10 RX ORDER — METOPROLOL TARTRATE 50 MG
25 TABLET ORAL
Qty: 0 | Refills: 0 | Status: DISCONTINUED | OUTPATIENT
Start: 2018-07-10 | End: 2018-07-10

## 2018-07-10 RX ORDER — INSULIN LISPRO 100/ML
VIAL (ML) SUBCUTANEOUS
Qty: 0 | Refills: 0 | Status: DISCONTINUED | OUTPATIENT
Start: 2018-07-10 | End: 2018-07-14

## 2018-07-10 RX ORDER — FUROSEMIDE 40 MG
40 TABLET ORAL ONCE
Qty: 0 | Refills: 0 | Status: COMPLETED | OUTPATIENT
Start: 2018-07-10 | End: 2018-07-10

## 2018-07-10 RX ORDER — FUROSEMIDE 40 MG
20 TABLET ORAL
Qty: 0 | Refills: 0 | Status: DISCONTINUED | OUTPATIENT
Start: 2018-07-11 | End: 2018-07-11

## 2018-07-10 RX ORDER — METOPROLOL TARTRATE 50 MG
25 TABLET ORAL ONCE
Qty: 0 | Refills: 0 | Status: COMPLETED | OUTPATIENT
Start: 2018-07-10 | End: 2018-07-10

## 2018-07-10 RX ORDER — OXYCODONE AND ACETAMINOPHEN 5; 325 MG/1; MG/1
2 TABLET ORAL EVERY 4 HOURS
Qty: 0 | Refills: 0 | Status: DISCONTINUED | OUTPATIENT
Start: 2018-07-10 | End: 2018-07-14

## 2018-07-10 RX ORDER — FOLIC ACID 0.8 MG
1 TABLET ORAL DAILY
Qty: 0 | Refills: 0 | Status: DISCONTINUED | OUTPATIENT
Start: 2018-07-10 | End: 2018-07-14

## 2018-07-10 RX ORDER — ASCORBIC ACID 60 MG
500 TABLET,CHEWABLE ORAL THREE TIMES A DAY
Qty: 0 | Refills: 0 | Status: DISCONTINUED | OUTPATIENT
Start: 2018-07-10 | End: 2018-07-14

## 2018-07-10 RX ORDER — GLUCAGON INJECTION, SOLUTION 0.5 MG/.1ML
1 INJECTION, SOLUTION SUBCUTANEOUS ONCE
Qty: 0 | Refills: 0 | Status: DISCONTINUED | OUTPATIENT
Start: 2018-07-10 | End: 2018-07-11

## 2018-07-10 RX ORDER — POTASSIUM CHLORIDE 20 MEQ
10 PACKET (EA) ORAL ONCE
Qty: 0 | Refills: 0 | Status: COMPLETED | OUTPATIENT
Start: 2018-07-10 | End: 2018-07-10

## 2018-07-10 RX ORDER — DEXTROSE 50 % IN WATER 50 %
25 SYRINGE (ML) INTRAVENOUS ONCE
Qty: 0 | Refills: 0 | Status: DISCONTINUED | OUTPATIENT
Start: 2018-07-10 | End: 2018-07-14

## 2018-07-10 RX ORDER — INSULIN LISPRO 100/ML
2 VIAL (ML) SUBCUTANEOUS
Qty: 0 | Refills: 0 | Status: DISCONTINUED | OUTPATIENT
Start: 2018-07-10 | End: 2018-07-10

## 2018-07-10 RX ORDER — ACETAMINOPHEN 500 MG
1000 TABLET ORAL ONCE
Qty: 0 | Refills: 0 | Status: COMPLETED | OUTPATIENT
Start: 2018-07-10 | End: 2018-07-10

## 2018-07-10 RX ORDER — DEXTROSE 50 % IN WATER 50 %
15 SYRINGE (ML) INTRAVENOUS ONCE
Qty: 0 | Refills: 0 | Status: DISCONTINUED | OUTPATIENT
Start: 2018-07-10 | End: 2018-07-11

## 2018-07-10 RX ORDER — POLYETHYLENE GLYCOL 3350 17 G/17G
17 POWDER, FOR SOLUTION ORAL DAILY
Qty: 0 | Refills: 0 | Status: DISCONTINUED | OUTPATIENT
Start: 2018-07-10 | End: 2018-07-14

## 2018-07-10 RX ORDER — MAGNESIUM SULFATE 500 MG/ML
2 VIAL (ML) INJECTION ONCE
Qty: 0 | Refills: 0 | Status: COMPLETED | OUTPATIENT
Start: 2018-07-10 | End: 2018-07-11

## 2018-07-10 RX ORDER — HYDROMORPHONE HYDROCHLORIDE 2 MG/ML
0.5 INJECTION INTRAMUSCULAR; INTRAVENOUS; SUBCUTANEOUS ONCE
Qty: 0 | Refills: 0 | Status: DISCONTINUED | OUTPATIENT
Start: 2018-07-10 | End: 2018-07-10

## 2018-07-10 RX ORDER — MAGNESIUM SULFATE 500 MG/ML
2 VIAL (ML) INJECTION ONCE
Qty: 0 | Refills: 0 | Status: COMPLETED | OUTPATIENT
Start: 2018-07-10 | End: 2018-07-10

## 2018-07-10 RX ORDER — OXYCODONE AND ACETAMINOPHEN 5; 325 MG/1; MG/1
1 TABLET ORAL EVERY 4 HOURS
Qty: 0 | Refills: 0 | Status: DISCONTINUED | OUTPATIENT
Start: 2018-07-10 | End: 2018-07-14

## 2018-07-10 RX ORDER — ACETAMINOPHEN 500 MG
1000 TABLET ORAL ONCE
Qty: 0 | Refills: 0 | Status: COMPLETED | OUTPATIENT
Start: 2018-07-10 | End: 2018-07-11

## 2018-07-10 RX ORDER — INSULIN GLARGINE 100 [IU]/ML
20 INJECTION, SOLUTION SUBCUTANEOUS AT BEDTIME
Qty: 0 | Refills: 0 | Status: DISCONTINUED | OUTPATIENT
Start: 2018-07-10 | End: 2018-07-14

## 2018-07-10 RX ORDER — DEXTROSE 50 % IN WATER 50 %
12.5 SYRINGE (ML) INTRAVENOUS ONCE
Qty: 0 | Refills: 0 | Status: DISCONTINUED | OUTPATIENT
Start: 2018-07-10 | End: 2018-07-14

## 2018-07-10 RX ORDER — METOPROLOL TARTRATE 50 MG
50 TABLET ORAL
Qty: 0 | Refills: 0 | Status: DISCONTINUED | OUTPATIENT
Start: 2018-07-11 | End: 2018-07-11

## 2018-07-10 RX ORDER — FERROUS SULFATE 325(65) MG
325 TABLET ORAL THREE TIMES A DAY
Qty: 0 | Refills: 0 | Status: DISCONTINUED | OUTPATIENT
Start: 2018-07-10 | End: 2018-07-14

## 2018-07-10 RX ORDER — ALBUMIN HUMAN 25 %
250 VIAL (ML) INTRAVENOUS ONCE
Qty: 0 | Refills: 0 | Status: DISCONTINUED | OUTPATIENT
Start: 2018-07-10 | End: 2018-07-10

## 2018-07-10 RX ORDER — ALBUMIN HUMAN 25 %
250 VIAL (ML) INTRAVENOUS ONCE
Qty: 0 | Refills: 0 | Status: COMPLETED | OUTPATIENT
Start: 2018-07-10 | End: 2018-07-10

## 2018-07-10 RX ORDER — SODIUM CHLORIDE 9 MG/ML
1000 INJECTION, SOLUTION INTRAVENOUS
Qty: 0 | Refills: 0 | Status: DISCONTINUED | OUTPATIENT
Start: 2018-07-10 | End: 2018-07-11

## 2018-07-10 RX ADMIN — HYDROMORPHONE HYDROCHLORIDE 0.5 MILLIGRAM(S): 2 INJECTION INTRAMUSCULAR; INTRAVENOUS; SUBCUTANEOUS at 05:48

## 2018-07-10 RX ADMIN — Medication 100 MILLIGRAM(S): at 06:32

## 2018-07-10 RX ADMIN — OXYCODONE AND ACETAMINOPHEN 2 TABLET(S): 5; 325 TABLET ORAL at 13:37

## 2018-07-10 RX ADMIN — Medication 2 UNIT(S): at 13:08

## 2018-07-10 RX ADMIN — Medication 1 TABLET(S): at 11:40

## 2018-07-10 RX ADMIN — FENTANYL CITRATE 25 MICROGRAM(S): 50 INJECTION INTRAVENOUS at 04:25

## 2018-07-10 RX ADMIN — Medication 100 MILLIGRAM(S): at 21:25

## 2018-07-10 RX ADMIN — Medication 325 MILLIGRAM(S): at 21:25

## 2018-07-10 RX ADMIN — Medication 40 MILLIGRAM(S): at 08:41

## 2018-07-10 RX ADMIN — Medication 500 MILLIGRAM(S): at 13:09

## 2018-07-10 RX ADMIN — Medication 100 MILLIGRAM(S): at 13:09

## 2018-07-10 RX ADMIN — Medication 1000 MILLIGRAM(S): at 13:33

## 2018-07-10 RX ADMIN — Medication 2 UNIT(S): at 16:30

## 2018-07-10 RX ADMIN — Medication 500 MILLIGRAM(S): at 21:25

## 2018-07-10 RX ADMIN — Medication 1000 MILLIGRAM(S): at 00:54

## 2018-07-10 RX ADMIN — Medication 400 MILLIGRAM(S): at 00:39

## 2018-07-10 RX ADMIN — OXYCODONE AND ACETAMINOPHEN 2 TABLET(S): 5; 325 TABLET ORAL at 19:56

## 2018-07-10 RX ADMIN — POLYETHYLENE GLYCOL 3350 17 GRAM(S): 17 POWDER, FOR SOLUTION ORAL at 11:40

## 2018-07-10 RX ADMIN — Medication 100 MILLIGRAM(S): at 09:22

## 2018-07-10 RX ADMIN — Medication 25 MILLIGRAM(S): at 06:32

## 2018-07-10 RX ADMIN — Medication 325 MILLIGRAM(S): at 16:31

## 2018-07-10 RX ADMIN — OXYCODONE AND ACETAMINOPHEN 2 TABLET(S): 5; 325 TABLET ORAL at 14:54

## 2018-07-10 RX ADMIN — ATORVASTATIN CALCIUM 40 MILLIGRAM(S): 80 TABLET, FILM COATED ORAL at 21:25

## 2018-07-10 RX ADMIN — FENTANYL CITRATE 25 MICROGRAM(S): 50 INJECTION INTRAVENOUS at 04:10

## 2018-07-10 RX ADMIN — PANTOPRAZOLE SODIUM 40 MILLIGRAM(S): 20 TABLET, DELAYED RELEASE ORAL at 11:40

## 2018-07-10 RX ADMIN — Medication 400 MILLIGRAM(S): at 10:10

## 2018-07-10 RX ADMIN — Medication 1 MILLIGRAM(S): at 13:09

## 2018-07-10 RX ADMIN — CLOPIDOGREL BISULFATE 75 MILLIGRAM(S): 75 TABLET, FILM COATED ORAL at 11:40

## 2018-07-10 RX ADMIN — Medication 2 UNIT(S): at 08:12

## 2018-07-10 RX ADMIN — Medication 500 MILLILITER(S): at 16:32

## 2018-07-10 RX ADMIN — Medication 25 MILLIGRAM(S): at 21:25

## 2018-07-10 RX ADMIN — Medication 325 MILLIGRAM(S): at 11:40

## 2018-07-10 RX ADMIN — Medication 325 MILLIGRAM(S): at 08:41

## 2018-07-10 RX ADMIN — Medication 50 GRAM(S): at 05:00

## 2018-07-10 RX ADMIN — Medication 500 MILLIGRAM(S): at 06:32

## 2018-07-10 RX ADMIN — HYDROMORPHONE HYDROCHLORIDE 0.5 MILLIGRAM(S): 2 INJECTION INTRAMUSCULAR; INTRAVENOUS; SUBCUTANEOUS at 05:32

## 2018-07-10 RX ADMIN — OXYCODONE AND ACETAMINOPHEN 2 TABLET(S): 5; 325 TABLET ORAL at 20:26

## 2018-07-10 RX ADMIN — Medication 50 MILLIEQUIVALENT(S): at 05:00

## 2018-07-10 NOTE — DIETITIAN INITIAL EVALUATION ADULT. - NUTRITION INTERVENTION
Assessment and Plan


Continue with monitoring vital signs/physical assessment/signs or symptoms of 

sepsis/feeds/void/stool/bilirubin.  Consider d/c with mother if she discharges 

tomorrow.  Physical exam was performed at mother's bedside and she verbalized 

understanding of the POC and all of her questions were answered.





- Patient Problems


(1) Single liveborn infant, delivered by 


Current Visit: Yes   Status: Acute   





Subjective


Date of service: 18


Principal diagnosis: 


Interval history: 


Term male infant delivered via primary  for fetal distress; maternal 

serologies are negative withe exception of HSV ll status, no active lesions on 

mother.  Infant is po feeding fair/well at breast and some with formula 

supplementation via syringe.  Infant is having adequate void and stool for age 

and TSB at 24 hrs is low intermediate risk.  





Objective





- Vital Signs


Vital Signs: 


 Vital Signs











  Temp Pulse Resp


 


 18 08:40  98.7 F  146  42


 


 18 00:30  98.6 F  142  44


 


 18 17:03  98.2 F  125  52








 Intake and Output











 18





 23:59 07:59 15:59


 


Intake Total  35 


 


Balance  35 


 


Intake:   


 


  Oral Amount (ml)  35 


 


    Similac Advance  35 


 


Other:   


 


  # Voids   


 


    Diaper 2 1 1


 


  # Bowel Movements 1 1 1


 


  Weight   3.451 kg








 Patient Weight











 18





 23:59


 


Weight 3.451 kg














- General Appearance


well appearing, alert, comfortable, no distress





- HENT


HENT: EOM normal, ears normal, nose normal, oropharynx normal


Pupils: bilateral: normal





- Neck


normal position





- Respiratory- Lungs


Inspection: symmetric


Auscultation: clear and equal





- Cardiovascular


Cardiovascular: pulse normal, regular rhythm, S1 (normal), S2 (normal), S3 (not 

detected), S4 (not detected), click (not detected), gallop (not detected), 

friction rub (not detected), no murmur


Precordial activity: normal





- Gastrointestinal


cylindrical, soft, normal BS





- Genitourinary


Genitourinary: normal


Rectum/Anus: normal





- Integumentary


intact, nevi





- Neurological


CN II-XII intact, normal motor function, reflexes normal





- Musculoskeletal


normal





- Labs


 Abnormal lab results











  18 Range/Units





  08:45 


 


Total Bilirubin  5.70 H  (0.1-1.2)  mg/dL














- Allied Health Notes Reviewed


nursing Vitamin/Nutrition Education

## 2018-07-10 NOTE — PROGRESS NOTE ADULT - PROBLEM SELECTOR PLAN 2
Tight glucose control  Titrate insulin and add premeal as required by CTICU insulin protocol   Endocrine consult  Diabetes education consult

## 2018-07-10 NOTE — DIETITIAN INITIAL EVALUATION ADULT. - PERTINENT LABORATORY DATA
07-10 Na140 mmol/L Glu 127 mg/dL<H> K+ 4.8 mmol/L Cr  0.84 mg/dL BUN 13.0 mg/dL Phos n/a   Alb n/a   PAB n/a

## 2018-07-10 NOTE — DIETITIAN INITIAL EVALUATION ADULT. - OTHER INFO
Pt reports following a Diabetic diet PTA, no recent wt changes. Currently feeling nauseous post surgery, able to tolerate sips of liquids.

## 2018-07-10 NOTE — PROGRESS NOTE ADULT - PROBLEM SELECTOR PLAN 1
Start beta blocker as tolerated by HR and SBP  Start lipitor for chronic graft patency prophylaxis  Encourage PO intake  Encourage OOB to chair and ambulation with PT  Encourage deep breathing exercised and coughing  Chest PT and IS with nursing staff

## 2018-07-10 NOTE — CONSULT NOTE ADULT - SUBJECTIVE AND OBJECTIVE BOX
HPI:  48 years old male with PMH of DM, HTN and HLD comes with Chest Pain. As per patient, he has intermittent retrosternal chest pain for a month. It is mostly on exertion - dull and achy, non radiating, relieves with rest and is associated with shortness of breath and dizziness. Today, he went to his PMD for routine follow up and was found to have new EKG changes so he was sent to ER for further evaluation. (05 Jul 2018 23:34) He underwent  LHC which showed multivessel disease and he subsequently underwent CABGx4 on 7/9/18.      He stated that he was diagnosed with diabetes about 4 years ago and had been on Metformin.  Upon returning from trip from [facility] his sugars were too high and PCP added Lantus insulin.  At home he takes Lantus 16 units at bedtime and Metformin 750 mg BID.  He tests hi sugars every other day and reports 's or less.  He denies diabetic microvacular disease.       PAST MEDICAL & SURGICAL HISTORY:  HLD (hyperlipidemia)  HTN (hypertension)  IDDM (insulin dependent diabetes mellitus)  No significant past surgical history    FAMILY HISTORY:  Family history of cerebrovascular accident (CVA) (Father, Mother)    SOCIAL HISTORY: denies EtOH/illicit drugs/tobacco, from [facility]    REVIEW OF SYSTEMS:  Constitutional: No fever, no change in weight.  Eyes: No  blurry vision  Lungs: No shortness of breath, no wheezing, no cough  CV: (+) pain at incision site  GI: No nausea, no vomiting, no constipation, no diarrhea, no abdominal pain  Skin: No rash, no infections.  Neurologic: No burning or pain in feet  Endocrine: No polyuria/polydipsia/nocturia  Psych: No depression, no anxiety, no trouble concentrating    MEDICATIONS  (STANDING):  ascorbic acid 500 milliGRAM(s) Oral three times a day  aspirin enteric coated 325 milliGRAM(s) Oral daily  atorvastatin 40 milliGRAM(s) Oral at bedtime  clopidogrel Tablet 75 milliGRAM(s) Oral daily  dextrose 50% Injectable 50 milliLiter(s) IV Push every 15 minutes  dextrose 50% Injectable 25 milliLiter(s) IV Push every 15 minutes  docusate sodium 100 milliGRAM(s) Oral three times a day  ferrous    sulfate 325 milliGRAM(s) Oral three times a day  folic acid 1 milliGRAM(s) Oral daily  insulin Infusion 1 Unit(s)/Hr (1 mL/Hr) IV Continuous <Continuous>  insulin lispro Injectable (HumaLOG) 2 Unit(s) SubCutaneous three times a day before meals  metoprolol tartrate 25 milliGRAM(s) Oral two times a day  multivitamin 1 Tablet(s) Oral daily  pantoprazole    Tablet 40 milliGRAM(s) Oral daily  polyethylene glycol 3350 17 Gram(s) Oral daily  sodium chloride 0.9%. 1000 milliLiter(s) (10 mL/Hr) IV Continuous <Continuous>  sodium chloride 0.9%. 200 milliLiter(s) (5 mL/Hr) IV Continuous <Continuous>    MEDICATIONS  (PRN):  oxyCODONE    5 mG/acetaminophen 325 mG 1 Tablet(s) Oral every 4 hours PRN Moderate Pain (4 - 6)  oxyCODONE    5 mG/acetaminophen 325 mG 2 Tablet(s) Oral every 4 hours PRN Severe Pain (7 - 10)      Allergies  No Known Allergies    PHYSICAL EXAM:    Vital Signs Last 24 Hrs  T(C): 37.2 (10 Jul 2018 16:00), Max: 37.6 (09 Jul 2018 23:00)  T(F): 98.9 (10 Jul 2018 16:00), Max: 99.7 (09 Jul 2018 23:00)  HR: 95 (10 Jul 2018 18:00) (77 - 105)  BP: 104/73 (09 Jul 2018 19:15) (104/73 - 108/73)  BP(mean): 85 (09 Jul 2018 19:15) (85 - 86)  RR: 21 (10 Jul 2018 18:00) (12 - 28)  SpO2: 98% (10 Jul 2018 18:00) (95% - 100%)    General appearance: Well developed, well nourished. NAD  Eyes: Pupils equal. EOMI  Neck: (+) central line  Lungs: Normal respiratory excursion. Lungs clear. chest tubes  CV: Regular cardiac rhythm. No murmur. .  Abdomen: Soft, non tender, (+) BS  Skin: Warm and moist.   Neuro: Cranial nerves intact.   Psych: Normal affect, good judgement.            LABS:                        7.8    9.9   )-----------( 89       ( 10 Jul 2018 13:52 )             23.0     07-10    135  |  99  |  15.0  ----------------------------<  277<H>  4.4   |  20.0<L>  |  1.06    Ca    7.6<L>      10 Jul 2018 13:52  Mg     2.2     07-10    TPro  4.9<L>  /  Alb  3.1<L>  /  TBili  1.1  /  DBili  x   /  AST  52<H>  /  ALT  25  /  AlkPhos  28<L>  07-09      LIVER FUNCTIONS - ( 09 Jul 2018 15:42 )  Alb: 3.1 g/dL / Pro: 4.9 g/dL / ALK PHOS: 28 U/L / ALT: 25 U/L / AST: 52 U/L / GGT: x           Hemoglobin A1C, Whole Blood: 7.4 % <H> [4.0 - 5.6] (07-07-18 @ 06:05)  CAPILLARY BLOOD GLUCOSE  POCT Blood Glucose.: 168 mg/dL (10 Jul 2018 17:57)  POCT Blood Glucose.: 211 mg/dL (10 Jul 2018 17:19)  POCT Blood Glucose.: 170 mg/dL (10 Jul 2018 16:26)  POCT Blood Glucose.: 249 mg/dL (10 Jul 2018 14:52)  POCT Blood Glucose.: 292 mg/dL (10 Jul 2018 13:42)  POCT Blood Glucose.: 270 mg/dL (10 Jul 2018 13:13)  POCT Blood Glucose.: 226 mg/dL (10 Jul 2018 12:31)  POCT Blood Glucose.: 223 mg/dL (10 Jul 2018 11:33)  POCT Blood Glucose.: 168 mg/dL (10 Jul 2018 10:16)  POCT Blood Glucose.: 139 mg/dL (10 Jul 2018 08:50)  POCT Blood Glucose.: 145 mg/dL (10 Jul 2018 08:08)  POCT Blood Glucose.: 154 mg/dL (10 Jul 2018 07:00)  POCT Blood Glucose.: 101 mg/dL (10 Jul 2018 05:57)  POCT Blood Glucose.: 140 mg/dL (10 Jul 2018 02:03)  POCT Blood Glucose.: 137 mg/dL (09 Jul 2018 20:57)  POCT Blood Glucose.: 139 mg/dL (09 Jul 2018 18:53)

## 2018-07-10 NOTE — PROGRESS NOTE ADULT - SUBJECTIVE AND OBJECTIVE BOX
Overnight events:  No acute events overnight. Extubated, pain controlled.    Past Medical History:  HLD (hyperlipidemia)  IDDM (insulin dependent diabetes mellitus)  Coronary artery disease involving native coronary artery of native heart without angina pectoris  Essential hypertension  Unstable angina pectoris    Medications:  aspirin enteric coated 325 milliGRAM(s) Oral daily  atorvastatin 40 milliGRAM(s) Oral at bedtime  cefuroxime  IVPB 1500 milliGRAM(s) IV Intermittent every 8 hours  chlorhexidine 0.12% Liquid 5 milliLiter(s) Swish and Spit every 4 hours  clopidogrel Tablet 75 milliGRAM(s) Oral daily  docusate sodium 100 milliGRAM(s) Oral three times a day  insulin Infusion 1 Unit(s)/Hr IV Continuous <Continuous>  meperidine     Injectable 25 milliGRAM(s) IV Push once  norepinephrine Infusion 0.05 MICROgram(s)/kG/Min IV Continuous <Continuous>  pantoprazole    Tablet 40 milliGRAM(s) Oral daily      Mode: CPAP with PS, FiO2: 40, PEEP: 5, PS: 5, MAP: 9  Daily     Daily Weight in k.3 (2018 06:11)      ABG - ( 10 Jul 2018 00:21 )  pH, Arterial: 7.38  pH, Blood: x     /  pCO2: 40    /  pO2: 94    / HCO3: 24    / Base Excess: -1.0  /  SaO2: 98                              10.7   13.1  )-----------( 105      ( 2018 15:42 )             31.4       140  |  105  |  11.0  ----------------------------<  157<H>  3.9   |  23.0  |  0.76    Ca    7.9<L>      2018 15:42  Mg     2.2         TPro  4.9<L>  /  Alb  3.1<L>  /  TBili  1.1  /  DBili  x   /  AST  52<H>  /  ALT  25  /  AlkPhos  28<L>      CARDIAC MARKERS ( 2018 15:42 )  x     / 0.70 ng/mL / 489 U/L / x     / 36.8 ng/mL    PT/INR - ( 2018 15:42 )   PT: 16.2 sec;   INR: 1.46 ratio         PTT - ( 2018 15:42 )  PTT:27.9 sec      Objective:  T(C): 37.5 (07-10-18 @ 01:00), Max: 37.6 (18 @ 23:00)  HR: 97 (07-10-18 @ 01:45) (62 - 100)  BP: 104/73 (18 @ 19:15) (84/50 - 156/97)  RR: 18 (07-10-18 @ 01:45) (11 - 28)  SpO2: 100% (07-10-18 @ 01:45) (98% - 100%)  Wt(kg): --CAPILLARY BLOOD GLUCOSE      POCT Blood Glucose.: 137 mg/dL (2018 20:57)  POCT Blood Glucose.: 139 mg/dL (2018 18:53)  POCT Blood Glucose.: 130 mg/dL (2018 16:57)  POCT Blood Glucose.: 136 mg/dL (2018 16:01)  POCT Blood Glucose.: 139 mg/dL (2018 06:28)  I&O's Summary    2018 07:01  -  10 Jul 2018 01:57  --------------------------------------------------------  IN: 2491.5 mL / OUT: 1945 mL / NET: 546.5 mL        Physical Exam  Neuro: A+O x 3, non-focal, speech clear and intact  Pulm: CTA, equal bilaterally  CV: RRR, no murmurs, +S1S2  Abd: soft, NT, ND, +BS  Ext: +DP Pulses b/l, +PT pulses, no edema  Inc: MSI C/D/I/stable w/ dsg, right C/D/I w/ dsg/Ace wrap

## 2018-07-11 LAB
ANION GAP SERPL CALC-SCNC: 11 MMOL/L — SIGNIFICANT CHANGE UP (ref 5–17)
ANION GAP SERPL CALC-SCNC: 11 MMOL/L — SIGNIFICANT CHANGE UP (ref 5–17)
BUN SERPL-MCNC: 18 MG/DL — SIGNIFICANT CHANGE UP (ref 8–20)
BUN SERPL-MCNC: 19 MG/DL — SIGNIFICANT CHANGE UP (ref 8–20)
CALCIUM SERPL-MCNC: 8.1 MG/DL — LOW (ref 8.6–10.2)
CALCIUM SERPL-MCNC: 8.1 MG/DL — LOW (ref 8.6–10.2)
CHLORIDE SERPL-SCNC: 103 MMOL/L — SIGNIFICANT CHANGE UP (ref 98–107)
CHLORIDE SERPL-SCNC: 105 MMOL/L — SIGNIFICANT CHANGE UP (ref 98–107)
CO2 SERPL-SCNC: 25 MMOL/L — SIGNIFICANT CHANGE UP (ref 22–29)
CO2 SERPL-SCNC: 25 MMOL/L — SIGNIFICANT CHANGE UP (ref 22–29)
CREAT SERPL-MCNC: 0.89 MG/DL — SIGNIFICANT CHANGE UP (ref 0.5–1.3)
CREAT SERPL-MCNC: 0.91 MG/DL — SIGNIFICANT CHANGE UP (ref 0.5–1.3)
EOSINOPHIL # BLD AUTO: 0 K/UL — SIGNIFICANT CHANGE UP (ref 0–0.5)
EOSINOPHIL NFR BLD AUTO: 0 % — SIGNIFICANT CHANGE UP (ref 0–5)
GLUCOSE BLDC GLUCOMTR-MCNC: 108 MG/DL — HIGH (ref 70–99)
GLUCOSE BLDC GLUCOMTR-MCNC: 111 MG/DL — HIGH (ref 70–99)
GLUCOSE BLDC GLUCOMTR-MCNC: 111 MG/DL — HIGH (ref 70–99)
GLUCOSE BLDC GLUCOMTR-MCNC: 133 MG/DL — HIGH (ref 70–99)
GLUCOSE BLDC GLUCOMTR-MCNC: 144 MG/DL — HIGH (ref 70–99)
GLUCOSE BLDC GLUCOMTR-MCNC: 163 MG/DL — HIGH (ref 70–99)
GLUCOSE BLDC GLUCOMTR-MCNC: 166 MG/DL — HIGH (ref 70–99)
GLUCOSE BLDC GLUCOMTR-MCNC: 168 MG/DL — HIGH (ref 70–99)
GLUCOSE BLDC GLUCOMTR-MCNC: 187 MG/DL — HIGH (ref 70–99)
GLUCOSE SERPL-MCNC: 105 MG/DL — SIGNIFICANT CHANGE UP (ref 70–115)
GLUCOSE SERPL-MCNC: 106 MG/DL — SIGNIFICANT CHANGE UP (ref 70–115)
HCT VFR BLD CALC: 22.5 % — LOW (ref 42–52)
HGB BLD-MCNC: 7.7 G/DL — LOW (ref 14–18)
LYMPHOCYTES # BLD AUTO: 1.1 K/UL — SIGNIFICANT CHANGE UP (ref 1–4.8)
LYMPHOCYTES # BLD AUTO: 10 % — LOW (ref 20–55)
MAGNESIUM SERPL-MCNC: 2.4 MG/DL — SIGNIFICANT CHANGE UP (ref 1.6–2.6)
MCHC RBC-ENTMCNC: 28.6 PG — SIGNIFICANT CHANGE UP (ref 27–31)
MCHC RBC-ENTMCNC: 34.2 G/DL — SIGNIFICANT CHANGE UP (ref 32–36)
MCV RBC AUTO: 83.6 FL — SIGNIFICANT CHANGE UP (ref 80–94)
MONOCYTES # BLD AUTO: 1 K/UL — HIGH (ref 0–0.8)
MONOCYTES NFR BLD AUTO: 9 % — SIGNIFICANT CHANGE UP (ref 3–10)
NEUTROPHILS # BLD AUTO: 8.7 K/UL — HIGH (ref 1.8–8)
NEUTROPHILS NFR BLD AUTO: 80.7 % — HIGH (ref 37–73)
PF4 HEPARIN CMPLX AB SER-ACNC: NEGATIVE — SIGNIFICANT CHANGE UP
PF4 HEPARIN CMPLX AB SERPL QL IA: 0.05 ABS — SIGNIFICANT CHANGE UP
PLATELET # BLD AUTO: 104 K/UL — LOW (ref 150–400)
POTASSIUM SERPL-MCNC: 4.5 MMOL/L — SIGNIFICANT CHANGE UP (ref 3.5–5.3)
POTASSIUM SERPL-MCNC: 4.7 MMOL/L — SIGNIFICANT CHANGE UP (ref 3.5–5.3)
POTASSIUM SERPL-SCNC: 4.5 MMOL/L — SIGNIFICANT CHANGE UP (ref 3.5–5.3)
POTASSIUM SERPL-SCNC: 4.7 MMOL/L — SIGNIFICANT CHANGE UP (ref 3.5–5.3)
RBC # BLD: 2.69 M/UL — LOW (ref 4.6–6.2)
RBC # FLD: 14.2 % — SIGNIFICANT CHANGE UP (ref 11–15.6)
SODIUM SERPL-SCNC: 139 MMOL/L — SIGNIFICANT CHANGE UP (ref 135–145)
SODIUM SERPL-SCNC: 141 MMOL/L — SIGNIFICANT CHANGE UP (ref 135–145)
WBC # BLD: 10.7 K/UL — SIGNIFICANT CHANGE UP (ref 4.8–10.8)
WBC # FLD AUTO: 10.7 K/UL — SIGNIFICANT CHANGE UP (ref 4.8–10.8)

## 2018-07-11 PROCEDURE — 93010 ELECTROCARDIOGRAM REPORT: CPT

## 2018-07-11 PROCEDURE — 71045 X-RAY EXAM CHEST 1 VIEW: CPT | Mod: 26

## 2018-07-11 PROCEDURE — 99232 SBSQ HOSP IP/OBS MODERATE 35: CPT

## 2018-07-11 RX ORDER — SODIUM CHLORIDE 9 MG/ML
3 INJECTION INTRAMUSCULAR; INTRAVENOUS; SUBCUTANEOUS EVERY 8 HOURS
Qty: 0 | Refills: 0 | Status: DISCONTINUED | OUTPATIENT
Start: 2018-07-11 | End: 2018-07-14

## 2018-07-11 RX ORDER — SENNA PLUS 8.6 MG/1
2 TABLET ORAL AT BEDTIME
Qty: 0 | Refills: 0 | Status: DISCONTINUED | OUTPATIENT
Start: 2018-07-11 | End: 2018-07-14

## 2018-07-11 RX ORDER — METOPROLOL TARTRATE 50 MG
50 TABLET ORAL EVERY 8 HOURS
Qty: 0 | Refills: 0 | Status: DISCONTINUED | OUTPATIENT
Start: 2018-07-11 | End: 2018-07-12

## 2018-07-11 RX ORDER — METOPROLOL TARTRATE 50 MG
50 TABLET ORAL EVERY 8 HOURS
Qty: 0 | Refills: 0 | Status: DISCONTINUED | OUTPATIENT
Start: 2018-07-11 | End: 2018-07-11

## 2018-07-11 RX ORDER — OXYCODONE HYDROCHLORIDE 5 MG/1
5 TABLET ORAL ONCE
Qty: 0 | Refills: 0 | Status: DISCONTINUED | OUTPATIENT
Start: 2018-07-11 | End: 2018-07-11

## 2018-07-11 RX ORDER — ENOXAPARIN SODIUM 100 MG/ML
40 INJECTION SUBCUTANEOUS DAILY
Qty: 0 | Refills: 0 | Status: DISCONTINUED | OUTPATIENT
Start: 2018-07-11 | End: 2018-07-14

## 2018-07-11 RX ORDER — INSULIN LISPRO 100/ML
5 VIAL (ML) SUBCUTANEOUS
Qty: 0 | Refills: 0 | Status: DISCONTINUED | OUTPATIENT
Start: 2018-07-11 | End: 2018-07-12

## 2018-07-11 RX ADMIN — OXYCODONE HYDROCHLORIDE 5 MILLIGRAM(S): 5 TABLET ORAL at 17:15

## 2018-07-11 RX ADMIN — Medication 50 MILLIGRAM(S): at 21:00

## 2018-07-11 RX ADMIN — Medication 500 MILLIGRAM(S): at 13:01

## 2018-07-11 RX ADMIN — Medication 500 MILLIGRAM(S): at 05:23

## 2018-07-11 RX ADMIN — Medication 500 MILLIGRAM(S): at 21:00

## 2018-07-11 RX ADMIN — ENOXAPARIN SODIUM 40 MILLIGRAM(S): 100 INJECTION SUBCUTANEOUS at 12:23

## 2018-07-11 RX ADMIN — OXYCODONE AND ACETAMINOPHEN 1 TABLET(S): 5; 325 TABLET ORAL at 09:00

## 2018-07-11 RX ADMIN — Medication 5 UNIT(S): at 12:20

## 2018-07-11 RX ADMIN — Medication 100 MILLIGRAM(S): at 05:23

## 2018-07-11 RX ADMIN — Medication 2: at 12:19

## 2018-07-11 RX ADMIN — OXYCODONE AND ACETAMINOPHEN 1 TABLET(S): 5; 325 TABLET ORAL at 16:36

## 2018-07-11 RX ADMIN — Medication 100 MILLIGRAM(S): at 21:00

## 2018-07-11 RX ADMIN — PANTOPRAZOLE SODIUM 40 MILLIGRAM(S): 20 TABLET, DELAYED RELEASE ORAL at 12:21

## 2018-07-11 RX ADMIN — SODIUM CHLORIDE 3 MILLILITER(S): 9 INJECTION INTRAMUSCULAR; INTRAVENOUS; SUBCUTANEOUS at 13:27

## 2018-07-11 RX ADMIN — Medication 325 MILLIGRAM(S): at 21:00

## 2018-07-11 RX ADMIN — Medication 50 MILLIGRAM(S): at 05:23

## 2018-07-11 RX ADMIN — Medication 325 MILLIGRAM(S): at 13:01

## 2018-07-11 RX ADMIN — Medication 50 GRAM(S): at 00:09

## 2018-07-11 RX ADMIN — Medication 1 TABLET(S): at 12:20

## 2018-07-11 RX ADMIN — OXYCODONE AND ACETAMINOPHEN 2 TABLET(S): 5; 325 TABLET ORAL at 17:42

## 2018-07-11 RX ADMIN — Medication 2: at 17:41

## 2018-07-11 RX ADMIN — INSULIN GLARGINE 20 UNIT(S): 100 INJECTION, SOLUTION SUBCUTANEOUS at 21:00

## 2018-07-11 RX ADMIN — OXYCODONE AND ACETAMINOPHEN 2 TABLET(S): 5; 325 TABLET ORAL at 22:30

## 2018-07-11 RX ADMIN — ATORVASTATIN CALCIUM 40 MILLIGRAM(S): 80 TABLET, FILM COATED ORAL at 21:00

## 2018-07-11 RX ADMIN — OXYCODONE AND ACETAMINOPHEN 1 TABLET(S): 5; 325 TABLET ORAL at 15:36

## 2018-07-11 RX ADMIN — OXYCODONE HYDROCHLORIDE 5 MILLIGRAM(S): 5 TABLET ORAL at 16:28

## 2018-07-11 RX ADMIN — Medication 25 MILLIGRAM(S): at 00:21

## 2018-07-11 RX ADMIN — Medication 325 MILLIGRAM(S): at 05:23

## 2018-07-11 RX ADMIN — Medication 50 MILLIGRAM(S): at 13:25

## 2018-07-11 RX ADMIN — OXYCODONE AND ACETAMINOPHEN 2 TABLET(S): 5; 325 TABLET ORAL at 13:01

## 2018-07-11 RX ADMIN — CLOPIDOGREL BISULFATE 75 MILLIGRAM(S): 75 TABLET, FILM COATED ORAL at 12:20

## 2018-07-11 RX ADMIN — Medication 5 UNIT(S): at 17:42

## 2018-07-11 RX ADMIN — SENNA PLUS 2 TABLET(S): 8.6 TABLET ORAL at 21:00

## 2018-07-11 RX ADMIN — Medication 5 UNIT(S): at 07:56

## 2018-07-11 RX ADMIN — POLYETHYLENE GLYCOL 3350 17 GRAM(S): 17 POWDER, FOR SOLUTION ORAL at 12:21

## 2018-07-11 RX ADMIN — Medication 1 MILLIGRAM(S): at 12:20

## 2018-07-11 RX ADMIN — Medication 100 MILLIGRAM(S): at 13:04

## 2018-07-11 RX ADMIN — Medication 2: at 07:56

## 2018-07-11 RX ADMIN — OXYCODONE AND ACETAMINOPHEN 1 TABLET(S): 5; 325 TABLET ORAL at 08:03

## 2018-07-11 RX ADMIN — Medication 400 MILLIGRAM(S): at 05:30

## 2018-07-11 RX ADMIN — INSULIN GLARGINE 20 UNIT(S): 100 INJECTION, SOLUTION SUBCUTANEOUS at 00:21

## 2018-07-11 RX ADMIN — SODIUM CHLORIDE 3 MILLILITER(S): 9 INJECTION INTRAMUSCULAR; INTRAVENOUS; SUBCUTANEOUS at 20:54

## 2018-07-11 RX ADMIN — OXYCODONE AND ACETAMINOPHEN 2 TABLET(S): 5; 325 TABLET ORAL at 14:01

## 2018-07-11 RX ADMIN — OXYCODONE AND ACETAMINOPHEN 2 TABLET(S): 5; 325 TABLET ORAL at 22:19

## 2018-07-11 RX ADMIN — Medication 325 MILLIGRAM(S): at 12:21

## 2018-07-11 NOTE — PROGRESS NOTE ADULT - SUBJECTIVE AND OBJECTIVE BOX
INTERVAL HPI/OVERNIGHT EVENTS: f.u DM  pt complains of pain, eating a little    MEDICATIONS  (STANDING):  ascorbic acid 500 milliGRAM(s) Oral three times a day  aspirin enteric coated 325 milliGRAM(s) Oral daily  atorvastatin 40 milliGRAM(s) Oral at bedtime  clopidogrel Tablet 75 milliGRAM(s) Oral daily  dextrose 50% Injectable 12.5 Gram(s) IV Push once  dextrose 50% Injectable 25 Gram(s) IV Push once  dextrose 50% Injectable 25 Gram(s) IV Push once  docusate sodium 100 milliGRAM(s) Oral three times a day  enoxaparin Injectable 40 milliGRAM(s) SubCutaneous daily  ferrous    sulfate 325 milliGRAM(s) Oral three times a day  folic acid 1 milliGRAM(s) Oral daily  insulin glargine Injectable (LANTUS) 20 Unit(s) SubCutaneous at bedtime  insulin lispro (HumaLOG) corrective regimen sliding scale   SubCutaneous Before meals and at bedtime  insulin lispro Injectable (HumaLOG) 5 Unit(s) SubCutaneous three times a day before meals  metoprolol tartrate 50 milliGRAM(s) Oral every 8 hours  multivitamin 1 Tablet(s) Oral daily  pantoprazole    Tablet 40 milliGRAM(s) Oral daily  polyethylene glycol 3350 17 Gram(s) Oral daily  senna 2 Tablet(s) Oral at bedtime  sodium chloride 0.9% lock flush 3 milliLiter(s) IV Push every 8 hours    MEDICATIONS  (PRN):  oxyCODONE    5 mG/acetaminophen 325 mG 1 Tablet(s) Oral every 4 hours PRN Moderate Pain (4 - 6)  oxyCODONE    5 mG/acetaminophen 325 mG 2 Tablet(s) Oral every 4 hours PRN Severe Pain (7 - 10)      Allergies  No Known Allergies      Vital Signs Last 24 Hrs  T(C): 36.7 (11 Jul 2018 16:34), Max: 37.2 (11 Jul 2018 04:00)  T(F): 98.1 (11 Jul 2018 16:34), Max: 98.9 (11 Jul 2018 04:00)  HR: 99 (11 Jul 2018 16:34) (90 - 103)  BP: 125/94 (11 Jul 2018 16:34) (125/94 - 134/80)  BP(mean): --  RR: 18 (11 Jul 2018 16:34) (13 - 31)  SpO2: 96% (11 Jul 2018 16:34) (93% - 99%)    PHYSICAL EXAM:  Constitutional: NAD, well-groomed, well-developed  HEENT: pupil equal, EOMI  Respiratory: CTAB  Cardiovascular: S1 and S2, RRR, no M/G/R  Gastrointestinal: BS+, soft NT/ND  Extremities: No peripheral edema  Neurological: A/O x 3, no focal deficits  Psychiatric: Normal mood, normal affect    LABS:                        7.7    10.7  )-----------( 104      ( 11 Jul 2018 02:25 )             22.5     07-11    139  |  103  |  19.0  ----------------------------<  106  4.7   |  25.0  |  0.91    Ca    8.1<L>      11 Jul 2018 02:25  Mg     2.4     07-11      Hemoglobin A1C, Whole Blood: 7.4 % <H> [4.0 - 5.6] (07-07-18 @ 06:05)  CAPILLARY BLOOD GLUCOSE  POCT Blood Glucose.: 166 mg/dL (11 Jul 2018 15:41)  POCT Blood Glucose.: 187 mg/dL (11 Jul 2018 12:18)  POCT Blood Glucose.: 168 mg/dL (11 Jul 2018 07:54)  POCT Blood Glucose.: 144 mg/dL (11 Jul 2018 04:19)  POCT Blood Glucose.: 108 mg/dL (11 Jul 2018 02:13)  POCT Blood Glucose.: 111 mg/dL (11 Jul 2018 01:10)  POCT Blood Glucose.: 111 mg/dL (11 Jul 2018 00:12)  POCT Blood Glucose.: 125 mg/dL (10 Jul 2018 22:27)  POCT Blood Glucose.: 106 mg/dL (10 Jul 2018 21:30)  POCT Blood Glucose.: 109 mg/dL (10 Jul 2018 20:22)  POCT Blood Glucose.: 132 mg/dL (10 Jul 2018 18:50)  POCT Blood Glucose.: 168 mg/dL (10 Jul 2018 17:57)  POCT Blood Glucose.: 211 mg/dL (10 Jul 2018 17:19)

## 2018-07-11 NOTE — PROGRESS NOTE ADULT - PROBLEM SELECTOR PLAN 7
Continue protonix and colace for GI ppx  lovenox held, encourage ambulation
Continue protonix and colace for GI ppx  Continue Lovenox for DVT ppx

## 2018-07-11 NOTE — PROGRESS NOTE ADULT - SUBJECTIVE AND OBJECTIVE BOX
Subjective:  without acute complaints overnight     HPI:  48 years old male with PMH of DM, HTN and HLD comes with Chest Pain. As per patient, he has intermittent retrosternal chest pain for a month. It is mostly on exertion - dull and achy, non radiating, relieves with rest and is associated with shortness of breath and dizziness. Today, he went to his PMD for routine follow up and was found to have new EKG changes so he was sent to ER for further evaluation.  Denies fever, cough, palpitations, nausea, vomiting, diaphoresis, sick contacts or recent travel. (05 Jul 2018 23:34)    PAST MEDICAL & SURGICAL HISTORY:  HLD (hyperlipidemia)  HTN (hypertension)  IDDM (insulin dependent diabetes mellitus)  No significant past surgical history          T(C): 37.2 (07-10-18 @ 16:00), Max: 37.4 (07-10-18 @ 03:00)  HR: 93 (07-11-18 @ 01:00) (88 - 105)  ABP: 109/61 (07-11-18 @ 01:00) (98/49 - 145/68)  ABP(mean): 77 (07-11-18 @ 01:00) (62 - 93)  RR: 13 (07-11-18 @ 01:00) (13 - 31)  SpO2: 93% (07-11-18 @ 01:00) (93% - 100%)  CVP(mm Hg): 13 (07-11-18 @ 01:00) (-1 - 14)      07-11    141  |  105  |  18.0  ----------------------------<  105  4.5   |  25.0  |  0.89    Ca    8.1<L>      11 Jul 2018 00:32  Mg     2.4     07-11    TPro  4.9<L>  /  Alb  3.1<L>  /  TBili  1.1  /  DBili  x   /  AST  52<H>  /  ALT  25  /  AlkPhos  28<L>  07-09                        7.8    9.9   )-----------( 89       ( 10 Jul 2018 13:52 )             23.0     PT/INR - ( 10 Jul 2018 02:49 )   PT: 14.5 sec;   INR: 1.31 ratio         PTT - ( 10 Jul 2018 02:49 )  PTT:30.6 sec        CAPILLARY BLOOD GLUCOSE      POCT Blood Glucose.: 111 mg/dL (11 Jul 2018 01:10)  POCT Blood Glucose.: 111 mg/dL (11 Jul 2018 00:12)  POCT Blood Glucose.: 125 mg/dL (10 Jul 2018 22:27)  POCT Blood Glucose.: 106 mg/dL (10 Jul 2018 21:30)  POCT Blood Glucose.: 109 mg/dL (10 Jul 2018 20:22)  POCT Blood Glucose.: 132 mg/dL (10 Jul 2018 18:50)  POCT Blood Glucose.: 168 mg/dL (10 Jul 2018 17:57)  POCT Blood Glucose.: 211 mg/dL (10 Jul 2018 17:19)  POCT Blood Glucose.: 170 mg/dL (10 Jul 2018 16:26)  POCT Blood Glucose.: 249 mg/dL (10 Jul 2018 14:52)  POCT Blood Glucose.: 292 mg/dL (10 Jul 2018 13:42)  POCT Blood Glucose.: 270 mg/dL (10 Jul 2018 13:13)  POCT Blood Glucose.: 226 mg/dL (10 Jul 2018 12:31)  POCT Blood Glucose.: 223 mg/dL (10 Jul 2018 11:33)  POCT Blood Glucose.: 168 mg/dL (10 Jul 2018 10:16)  POCT Blood Glucose.: 139 mg/dL (10 Jul 2018 08:50)  POCT Blood Glucose.: 145 mg/dL (10 Jul 2018 08:08)  POCT Blood Glucose.: 154 mg/dL (10 Jul 2018 07:00)  POCT Blood Glucose.: 101 mg/dL (10 Jul 2018 05:57)  POCT Blood Glucose.: 140 mg/dL (10 Jul 2018 02:03)            ABG - ( 10 Jul 2018 04:10 )  pH, Arterial: 7.41  pH, Blood: x     /  pCO2: 40    /  pO2: 83    / HCO3: 25    / Base Excess: 1.0   /  SaO2: 97        CXR:  < from: Xray Chest 1 View- PORTABLE-Routine (07.10.18 @ 12:12) >    INTERPRETATION:  CHEST AP PORTABLE:    History: Post-cardiac surgery.     Date and time of exam: 7/10/2018 5:28 AM.    Technique: A single AP view ofthe chest was obtained.    Comparison exam: 7/9/2018 2:21 PM.    Findings:  Status post extubation and removal of nasogastric tube since the prior   examination. Other lines and tubes are unchanged. The lungs are clear   without evidence of pneumothorax or pleural effusion..    Impression:  Extubation and removal of nasogastric tube, otherwise stable.    < end of copied text >      I&O's Detail    09 Jul 2018 07:01  -  10 Jul 2018 07:00  --------------------------------------------------------  IN:    Albumin 5%  - 250 mL: 750 mL    insulin Infusion: 32.5 mL    Lactated Ringers IV Bolus: 1000 mL    norepinephrine Infusion: 54.5 mL    sodium chloride 0.9%: 15 mL    sodium chloride 0.9%.: 70 mL    sodium chloride 0.9%.: 170 mL    Solution: 250 mL    Solution: 100 mL    Solution: 100 mL    Solution: 100 mL  Total IN: 2642 mL    OUT:    Chest Tube: 55 mL    Chest Tube: 200 mL    Chest Tube: 290 mL    Indwelling Catheter - Urethral: 2110 mL  Total OUT: 2655 mL    Total NET: -13 mL      10 Jul 2018 07:01  -  11 Jul 2018 01:38  --------------------------------------------------------  IN:    Albumin 5%  - 250 mL: 250 mL    insulin Infusion: 67.5 mL    Oral Fluid: 240 mL    sodium chloride 0.9%.: 180 mL    sodium chloride 0.9%.: 90 mL    Solution: 50 mL    Solution: 100 mL  Total IN: 977.5 mL    OUT:    Chest Tube: 20 mL    Chest Tube: 90 mL    Chest Tube: 50 mL    Indwelling Catheter - Urethral: 3160 mL  Total OUT: 3320 mL    Total NET: -2342.5 mL        Drug Dosing Weight  Height (cm): 160.02 (07 Jul 2018 05:55)  Weight (kg): 61.2 (07 Jul 2018 05:55)  BMI (kg/m2): 23.9 (07 Jul 2018 05:55)  BSA (m2): 1.64 (07 Jul 2018 05:55)    MEDICATIONS  (STANDING):  ascorbic acid 500 milliGRAM(s) Oral three times a day  aspirin enteric coated 325 milliGRAM(s) Oral daily  atorvastatin 40 milliGRAM(s) Oral at bedtime  clopidogrel Tablet 75 milliGRAM(s) Oral daily  dextrose 5%. 1000 milliLiter(s) (50 mL/Hr) IV Continuous <Continuous>  dextrose 50% Injectable 50 milliLiter(s) IV Push every 15 minutes  dextrose 50% Injectable 25 milliLiter(s) IV Push every 15 minutes  dextrose 50% Injectable 12.5 Gram(s) IV Push once  dextrose 50% Injectable 25 Gram(s) IV Push once  dextrose 50% Injectable 25 Gram(s) IV Push once  docusate sodium 100 milliGRAM(s) Oral three times a day  ferrous    sulfate 325 milliGRAM(s) Oral three times a day  folic acid 1 milliGRAM(s) Oral daily  furosemide   Injectable 20 milliGRAM(s) IV Push two times a day  insulin glargine Injectable (LANTUS) 20 Unit(s) SubCutaneous at bedtime  insulin Infusion 1 Unit(s)/Hr (1 mL/Hr) IV Continuous <Continuous>  insulin lispro (HumaLOG) corrective regimen sliding scale   SubCutaneous Before meals and at bedtime  metoprolol tartrate 50 milliGRAM(s) Oral two times a day  multivitamin 1 Tablet(s) Oral daily  pantoprazole    Tablet 40 milliGRAM(s) Oral daily  polyethylene glycol 3350 17 Gram(s) Oral daily  sodium chloride 0.9%. 1000 milliLiter(s) (10 mL/Hr) IV Continuous <Continuous>  sodium chloride 0.9%. 200 milliLiter(s) (5 mL/Hr) IV Continuous <Continuous>    MEDICATIONS  (PRN):  acetaminophen  IVPB. 1000 milliGRAM(s) IV Intermittent once PRN on request  dextrose 40% Gel 15 Gram(s) Oral once PRN Blood Glucose LESS THAN 70 milliGRAM(s)/deciLiter  glucagon  Injectable 1 milliGRAM(s) IntraMuscular once PRN Glucose <70 milliGRAM(s)/deciLiter  oxyCODONE    5 mG/acetaminophen 325 mG 1 Tablet(s) Oral every 4 hours PRN Moderate Pain (4 - 6)  oxyCODONE    5 mG/acetaminophen 325 mG 2 Tablet(s) Oral every 4 hours PRN Severe Pain (7 - 10)

## 2018-07-11 NOTE — PROGRESS NOTE ADULT - SUBJECTIVE AND OBJECTIVE BOX
CARDIOLOGY PROGRESS NOTE   ([facility])                                                                                                        Subjective:     denied CP, dyspnea      Vitals:  T(C): 36.7 (07-11-18 @ 16:34), Max: 37.2 (07-11-18 @ 04:00)  HR: 99 (07-11-18 @ 16:34) (90 - 103)  BP: 125/94 (07-11-18 @ 16:34) (125/94 - 134/80)  RR: 18 (07-11-18 @ 16:34) (13 - 31)  SpO2: 96% (07-11-18 @ 16:34) (93% - 99%)  Wt(kg): --  I&O's Summary    10 Jul 2018 07:01  -  11 Jul 2018 07:00  --------------------------------------------------------  IN: 1220.5 mL / OUT: 4415 mL / NET: -3194.5 mL    11 Jul 2018 07:01  -  11 Jul 2018 19:53  --------------------------------------------------------  IN: 280 mL / OUT: 840 mL / NET: -560 mL          PHYSICAL EXAM:  Appearance: Normal	  HEENT:   Atraumatic  Cardiovascular: Normal S1 S2, No JVD, No murmurs, No edema  Respiratory: Lungs clear to auscultation	  Gastrointestinal:  Soft, Non-tender, + BS	  Skin: No rashes, No ecchymoses, No cyanosis  Neurologic: Alert and awake, able to move extremities  Extremities: No edema    TELEMETRY: 	          CURRENT MEDICATIONS:  metoprolol tartrate 50 milliGRAM(s) Oral every 8 hours  oxyCODONE    5 mG/acetaminophen 325 mG 1 Tablet(s) Oral every 4 hours PRN  oxyCODONE    5 mG/acetaminophen 325 mG 2 Tablet(s) Oral every 4 hours PRN    docusate sodium 100 milliGRAM(s) Oral three times a day  pantoprazole    Tablet 40 milliGRAM(s) Oral daily  polyethylene glycol 3350 17 Gram(s) Oral daily  senna 2 Tablet(s) Oral at bedtime    atorvastatin 40 milliGRAM(s) Oral at bedtime  dextrose 50% Injectable 12.5 Gram(s) IV Push once  dextrose 50% Injectable 25 Gram(s) IV Push once  dextrose 50% Injectable 25 Gram(s) IV Push once  insulin glargine Injectable (LANTUS) 20 Unit(s) SubCutaneous at bedtime  insulin lispro (HumaLOG) corrective regimen sliding scale   SubCutaneous Before meals and at bedtime  insulin lispro Injectable (HumaLOG) 5 Unit(s) SubCutaneous three times a day before meals    ascorbic acid 500 milliGRAM(s) Oral three times a day  aspirin enteric coated 325 milliGRAM(s) Oral daily  clopidogrel Tablet 75 milliGRAM(s) Oral daily  enoxaparin Injectable 40 milliGRAM(s) SubCutaneous daily  ferrous    sulfate 325 milliGRAM(s) Oral three times a day  folic acid 1 milliGRAM(s) Oral daily  multivitamin 1 Tablet(s) Oral daily  sodium chloride 0.9% lock flush 3 milliLiter(s) IV Push every 8 hours      LABS:	 	                            7.7    10.7  )-----------( 104      ( 11 Jul 2018 02:25 )             22.5     07-11    139  |  103  |  19.0  ----------------------------<  106  4.7   |  25.0  |  0.91    Ca    8.1<L>      11 Jul 2018 02:25  Mg     2.4     07-11      proBNP: Serum Pro-Brain Natriuretic Peptide: 103 pg/mL (07-07 @ 06:05)    Lipid Profile:   HgA1c:   TSH: Thyroid Stimulating Hormone, Serum: 1.65 uIU/mL (07-06 @ 08:15)

## 2018-07-11 NOTE — PROGRESS NOTE ADULT - PROBLEM SELECTOR PLAN 2
Tight glucose control  started on 20 lantus overnight (home dose 16 with a1c 7.8); added 5 humalog  Endocrine consult  Diabetes education consult

## 2018-07-11 NOTE — PROGRESS NOTE ADULT - PROBLEM SELECTOR PLAN 1
tolerating lopressor 50 BID, continue to titrate   lipitor for chronic graft patency prophylaxis  continue asa/plavix   Encourage PO intake  Encourage OOB to chair and ambulation with PT  Encourage deep breathing exercised and coughing  Chest PT and IS with nursing staff tolerating lopressor 50 BID, continue to titrate   lipitor for chronic graft patency prophylaxis  continue asa/plavix   continue lasix for post operative volume overload, maintain electrolytes, continue to rrent   Encourage PO intake  Encourage OOB to chair and ambulation with PT  Encourage deep breathing exercised and coughing  Chest PT and IS with nursing staff

## 2018-07-12 LAB
ANION GAP SERPL CALC-SCNC: 11 MMOL/L — SIGNIFICANT CHANGE UP (ref 5–17)
ANISOCYTOSIS BLD QL: SLIGHT — SIGNIFICANT CHANGE UP
BUN SERPL-MCNC: 23 MG/DL — HIGH (ref 8–20)
CALCIUM SERPL-MCNC: 8.2 MG/DL — LOW (ref 8.6–10.2)
CHLORIDE SERPL-SCNC: 104 MMOL/L — SIGNIFICANT CHANGE UP (ref 98–107)
CO2 SERPL-SCNC: 25 MMOL/L — SIGNIFICANT CHANGE UP (ref 22–29)
CREAT SERPL-MCNC: 0.99 MG/DL — SIGNIFICANT CHANGE UP (ref 0.5–1.3)
ELLIPTOCYTES BLD QL SMEAR: SLIGHT — SIGNIFICANT CHANGE UP
EOSINOPHIL NFR BLD AUTO: 1 % — SIGNIFICANT CHANGE UP (ref 0–6)
GLUCOSE BLDC GLUCOMTR-MCNC: 121 MG/DL — HIGH (ref 70–99)
GLUCOSE BLDC GLUCOMTR-MCNC: 161 MG/DL — HIGH (ref 70–99)
GLUCOSE BLDC GLUCOMTR-MCNC: 194 MG/DL — HIGH (ref 70–99)
GLUCOSE BLDC GLUCOMTR-MCNC: 260 MG/DL — HIGH (ref 70–99)
GLUCOSE SERPL-MCNC: 123 MG/DL — HIGH (ref 70–115)
HCT VFR BLD CALC: 24.7 % — LOW (ref 42–52)
HGB BLD-MCNC: 8.2 G/DL — LOW (ref 14–18)
HYPOCHROMIA BLD QL: SLIGHT — SIGNIFICANT CHANGE UP
LYMPHOCYTES # BLD AUTO: 30 % — SIGNIFICANT CHANGE UP (ref 20–55)
MACROCYTES BLD QL: SLIGHT — SIGNIFICANT CHANGE UP
MAGNESIUM SERPL-MCNC: 2.3 MG/DL — SIGNIFICANT CHANGE UP (ref 1.6–2.6)
MCHC RBC-ENTMCNC: 28 PG — SIGNIFICANT CHANGE UP (ref 27–31)
MCHC RBC-ENTMCNC: 33.2 G/DL — SIGNIFICANT CHANGE UP (ref 32–36)
MCV RBC AUTO: 84.3 FL — SIGNIFICANT CHANGE UP (ref 80–94)
MICROCYTES BLD QL: SLIGHT — SIGNIFICANT CHANGE UP
MONOCYTES NFR BLD AUTO: 14 % — HIGH (ref 3–10)
NEUTROPHILS NFR BLD AUTO: 55 % — SIGNIFICANT CHANGE UP (ref 37–73)
PLAT MORPH BLD: NORMAL — SIGNIFICANT CHANGE UP
PLATELET # BLD AUTO: 133 K/UL — LOW (ref 150–400)
POIKILOCYTOSIS BLD QL AUTO: SLIGHT — SIGNIFICANT CHANGE UP
POTASSIUM SERPL-MCNC: 4.6 MMOL/L — SIGNIFICANT CHANGE UP (ref 3.5–5.3)
POTASSIUM SERPL-SCNC: 4.6 MMOL/L — SIGNIFICANT CHANGE UP (ref 3.5–5.3)
RBC # BLD: 2.93 M/UL — LOW (ref 4.6–6.2)
RBC # FLD: 14.2 % — SIGNIFICANT CHANGE UP (ref 11–15.6)
RBC BLD AUTO: ABNORMAL
SODIUM SERPL-SCNC: 140 MMOL/L — SIGNIFICANT CHANGE UP (ref 135–145)
WBC # BLD: 12.2 K/UL — HIGH (ref 4.8–10.8)
WBC # FLD AUTO: 12.2 K/UL — HIGH (ref 4.8–10.8)

## 2018-07-12 PROCEDURE — 71045 X-RAY EXAM CHEST 1 VIEW: CPT | Mod: 26

## 2018-07-12 RX ORDER — METFORMIN HYDROCHLORIDE 850 MG/1
850 TABLET ORAL
Qty: 0 | Refills: 0 | Status: DISCONTINUED | OUTPATIENT
Start: 2018-07-12 | End: 2018-07-13

## 2018-07-12 RX ORDER — BENZOCAINE AND MENTHOL 5; 1 G/100ML; G/100ML
1 LIQUID ORAL
Qty: 0 | Refills: 0 | Status: DISCONTINUED | OUTPATIENT
Start: 2018-07-12 | End: 2018-07-14

## 2018-07-12 RX ORDER — ATENOLOL 25 MG/1
50 TABLET ORAL
Qty: 0 | Refills: 0 | Status: DISCONTINUED | OUTPATIENT
Start: 2018-07-12 | End: 2018-07-14

## 2018-07-12 RX ORDER — SORBITOL SOLUTION 70 %
30 SOLUTION, ORAL MISCELLANEOUS ONCE
Qty: 0 | Refills: 0 | Status: COMPLETED | OUTPATIENT
Start: 2018-07-12 | End: 2018-07-12

## 2018-07-12 RX ADMIN — Medication 100 MILLIGRAM(S): at 05:27

## 2018-07-12 RX ADMIN — Medication 325 MILLIGRAM(S): at 14:11

## 2018-07-12 RX ADMIN — PANTOPRAZOLE SODIUM 40 MILLIGRAM(S): 20 TABLET, DELAYED RELEASE ORAL at 11:21

## 2018-07-12 RX ADMIN — Medication 325 MILLIGRAM(S): at 11:21

## 2018-07-12 RX ADMIN — OXYCODONE AND ACETAMINOPHEN 2 TABLET(S): 5; 325 TABLET ORAL at 11:56

## 2018-07-12 RX ADMIN — SODIUM CHLORIDE 3 MILLILITER(S): 9 INJECTION INTRAMUSCULAR; INTRAVENOUS; SUBCUTANEOUS at 11:30

## 2018-07-12 RX ADMIN — Medication 50 MILLIGRAM(S): at 05:27

## 2018-07-12 RX ADMIN — Medication 1 TABLET(S): at 11:21

## 2018-07-12 RX ADMIN — Medication 500 MILLIGRAM(S): at 14:11

## 2018-07-12 RX ADMIN — Medication 30 MILLILITER(S): at 11:26

## 2018-07-12 RX ADMIN — ATENOLOL 50 MILLIGRAM(S): 25 TABLET ORAL at 17:26

## 2018-07-12 RX ADMIN — ENOXAPARIN SODIUM 40 MILLIGRAM(S): 100 INJECTION SUBCUTANEOUS at 22:16

## 2018-07-12 RX ADMIN — Medication 1 MILLIGRAM(S): at 11:21

## 2018-07-12 RX ADMIN — OXYCODONE AND ACETAMINOPHEN 1 TABLET(S): 5; 325 TABLET ORAL at 05:30

## 2018-07-12 RX ADMIN — Medication 2: at 22:16

## 2018-07-12 RX ADMIN — CLOPIDOGREL BISULFATE 75 MILLIGRAM(S): 75 TABLET, FILM COATED ORAL at 11:21

## 2018-07-12 RX ADMIN — METFORMIN HYDROCHLORIDE 850 MILLIGRAM(S): 850 TABLET ORAL at 17:26

## 2018-07-12 RX ADMIN — Medication 6: at 12:28

## 2018-07-12 RX ADMIN — Medication 325 MILLIGRAM(S): at 22:17

## 2018-07-12 RX ADMIN — ATORVASTATIN CALCIUM 40 MILLIGRAM(S): 80 TABLET, FILM COATED ORAL at 22:17

## 2018-07-12 RX ADMIN — BENZOCAINE AND MENTHOL 1 LOZENGE: 5; 1 LIQUID ORAL at 11:06

## 2018-07-12 RX ADMIN — SODIUM CHLORIDE 3 MILLILITER(S): 9 INJECTION INTRAMUSCULAR; INTRAVENOUS; SUBCUTANEOUS at 05:24

## 2018-07-12 RX ADMIN — Medication 100 MILLIGRAM(S): at 14:11

## 2018-07-12 RX ADMIN — Medication 5 UNIT(S): at 09:01

## 2018-07-12 RX ADMIN — Medication 500 MILLIGRAM(S): at 22:17

## 2018-07-12 RX ADMIN — Medication 2: at 17:26

## 2018-07-12 RX ADMIN — OXYCODONE AND ACETAMINOPHEN 2 TABLET(S): 5; 325 TABLET ORAL at 17:32

## 2018-07-12 RX ADMIN — POLYETHYLENE GLYCOL 3350 17 GRAM(S): 17 POWDER, FOR SOLUTION ORAL at 11:28

## 2018-07-12 RX ADMIN — Medication 500 MILLIGRAM(S): at 05:27

## 2018-07-12 RX ADMIN — INSULIN GLARGINE 20 UNIT(S): 100 INJECTION, SOLUTION SUBCUTANEOUS at 22:15

## 2018-07-12 RX ADMIN — OXYCODONE AND ACETAMINOPHEN 2 TABLET(S): 5; 325 TABLET ORAL at 11:26

## 2018-07-12 RX ADMIN — Medication 325 MILLIGRAM(S): at 05:27

## 2018-07-12 RX ADMIN — SODIUM CHLORIDE 3 MILLILITER(S): 9 INJECTION INTRAMUSCULAR; INTRAVENOUS; SUBCUTANEOUS at 21:07

## 2018-07-12 NOTE — PROGRESS NOTE ADULT - SUBJECTIVE AND OBJECTIVE BOX
INTERVAL HPI/OVERNIGHT EVENTS: f/u DM  doing better today, no issues, eating well    MEDICATIONS  (STANDING):  ascorbic acid 500 milliGRAM(s) Oral three times a day  aspirin enteric coated 325 milliGRAM(s) Oral daily  ATENolol  Tablet 50 milliGRAM(s) Oral two times a day  atorvastatin 40 milliGRAM(s) Oral at bedtime  clopidogrel Tablet 75 milliGRAM(s) Oral daily  dextrose 50% Injectable 12.5 Gram(s) IV Push once  dextrose 50% Injectable 25 Gram(s) IV Push once  dextrose 50% Injectable 25 Gram(s) IV Push once  docusate sodium 100 milliGRAM(s) Oral three times a day  enoxaparin Injectable 40 milliGRAM(s) SubCutaneous daily  ferrous    sulfate 325 milliGRAM(s) Oral three times a day  folic acid 1 milliGRAM(s) Oral daily  insulin glargine Injectable (LANTUS) 20 Unit(s) SubCutaneous at bedtime  insulin lispro (HumaLOG) corrective regimen sliding scale   SubCutaneous Before meals and at bedtime  metFORMIN 850 milliGRAM(s) Oral two times a day  multivitamin 1 Tablet(s) Oral daily  pantoprazole    Tablet 40 milliGRAM(s) Oral daily  polyethylene glycol 3350 17 Gram(s) Oral daily  senna 2 Tablet(s) Oral at bedtime  sodium chloride 0.9% lock flush 3 milliLiter(s) IV Push every 8 hours    MEDICATIONS  (PRN):  benzocaine 15 mG/menthol 3.6 mG Lozenge 1 Lozenge Oral every 3 hours PRN Sore Throat  oxyCODONE    5 mG/acetaminophen 325 mG 1 Tablet(s) Oral every 4 hours PRN Moderate Pain (4 - 6)  oxyCODONE    5 mG/acetaminophen 325 mG 2 Tablet(s) Oral every 4 hours PRN Severe Pain (7 - 10)      Allergies    No Known Allergies    Vital Signs Last 24 Hrs  T(C): 36.6 (12 Jul 2018 16:04), Max: 37.1 (11 Jul 2018 20:36)  T(F): 97.8 (12 Jul 2018 16:04), Max: 98.8 (11 Jul 2018 20:36)  HR: 111 (12 Jul 2018 16:04) (96 - 111)  BP: 118/78 (12 Jul 2018 16:04) (101/71 - 125/94)  BP(mean): --  RR: 20 (12 Jul 2018 16:04) (18 - 20)  SpO2: 100% (12 Jul 2018 16:04) (96% - 100%)    PHYSICAL EXAM:  Constitutional: NAD, well-groomed, well-developed  HEENT: pupil equal, EOMI  Respiratory: CTAB  Cardiovascular: S1 and S2, RRR, no M/G/R  Gastrointestinal: BS+, soft NT/ND  Extremities: No peripheral edema  Neurological: A/O x 3, no focal deficits  Psychiatric: Normal mood, normal affect        LABS:                        8.2    12.2  )-----------( 133      ( 12 Jul 2018 06:05 )             24.7     07-12    140  |  104  |  23.0<H>  ----------------------------<  123<H>  4.6   |  25.0  |  0.99    Ca    8.2<L>      12 Jul 2018 06:05  Mg     2.3     07-12    Thyroid Stimulating Hormone, Serum: 1.65 uIU/mL (07-06-18 @ 08:15)  Hemoglobin A1C, Whole Blood: 7.4 % <H> [4.0 - 5.6] (07-07-18 @ 06:05)  CAPILLARY BLOOD GLUCOSE  POCT Blood Glucose.: 260 mg/dL (12 Jul 2018 12:07)  POCT Blood Glucose.: 121 mg/dL (12 Jul 2018 08:36)  POCT Blood Glucose.: 133 mg/dL (11 Jul 2018 20:54)  POCT Blood Glucose.: 163 mg/dL (11 Jul 2018 17:21)

## 2018-07-12 NOTE — PROGRESS NOTE ADULT - PROBLEM SELECTOR PLAN 1
Start beta blocker as tolerated by HR and SBP  Start lipitor for chronic graft patency prophylaxis  Encourage PO intake  Encourage OOB to chair and ambulation with PT  Encourage deep breathing exercised and coughing  Chest PT and IS with nursing staff  maintain PW, isolate them  plan to discharge home Saturday if stable   DW CT team in am rounds

## 2018-07-12 NOTE — PROGRESS NOTE ADULT - SUBJECTIVE AND OBJECTIVE BOX
Subjective: " My throat hurts "  Pt in chair NAD reports that his throat is bothering him.  It was explained it was most likely from the FT Tube     VITAL SIGNS  T(C): 36.9 (18 @ 05:25), Max: 37.1 (18 @ 20:36)  HR: 106 (18 @ 05:25) (96 - 106)  BP: 119/85 (18 @ 05:25) (108/62 - 134/80)  RR: 20 (18 @ 05:25) (18 - 20)  SpO2: 97% (18 @ 05:25) (96% - 99%) RA             Daily     Daily Weight in k.2 (2018 06:00)  Admit Wt: Drug Dosing Weight  Height (cm): 160.02 (2018 05:55)  Weight (kg): 61.2 (2018 05:55)    Telemetry:   SR-ST  LVEF:  55%    MEDICATIONS  ascorbic acid 500 milliGRAM(s) Oral three times a day  aspirin enteric coated 325 milliGRAM(s) Oral daily  atorvastatin 40 milliGRAM(s) Oral at bedtime  benzocaine 15 mG/menthol 3.6 mG Lozenge 1 Lozenge Oral every 3 hours PRN  clopidogrel Tablet 75 milliGRAM(s) Oral daily  dextrose 50% Injectable 12.5 Gram(s) IV Push once  dextrose 50% Injectable 25 Gram(s) IV Push once  dextrose 50% Injectable 25 Gram(s) IV Push once  docusate sodium 100 milliGRAM(s) Oral three times a day  enoxaparin Injectable 40 milliGRAM(s) SubCutaneous daily  ferrous    sulfate 325 milliGRAM(s) Oral three times a day  folic acid 1 milliGRAM(s) Oral daily  insulin glargine Injectable (LANTUS) 20 Unit(s) SubCutaneous at bedtime  insulin lispro (HumaLOG) corrective regimen sliding scale   SubCutaneous Before meals and at bedtime  insulin lispro Injectable (HumaLOG) 5 Unit(s) SubCutaneous three times a day before meals  metoprolol tartrate 50 milliGRAM(s) Oral every 8 hours  multivitamin 1 Tablet(s) Oral daily  oxyCODONE    5 mG/acetaminophen 325 mG 1 Tablet(s) Oral every 4 hours PRN  oxyCODONE    5 mG/acetaminophen 325 mG 2 Tablet(s) Oral every 4 hours PRN  pantoprazole    Tablet 40 milliGRAM(s) Oral daily  polyethylene glycol 3350 17 Gram(s) Oral daily  senna 2 Tablet(s) Oral at bedtime  sodium chloride 0.9% lock flush 3 milliLiter(s) IV Push every 8 hours  sorbitol 70% Solution 30 milliLiter(s) Oral once    MEDICATIONS  (PRN):  benzocaine 15 mG/menthol 3.6 mG Lozenge 1 Lozenge Oral every 3 hours PRN Sore Throat  oxyCODONE    5 mG/acetaminophen 325 mG 1 Tablet(s) Oral every 4 hours PRN Moderate Pain (4 - 6)  oxyCODONE    5 mG/acetaminophen 325 mG 2 Tablet(s) Oral every 4 hours PRN Severe Pain (7 - 10)        PHYSICAL EXAM  General: alert Awake NAD   Respiratory: decreased b/l, but clear   CV: RRR S1 S2   Abdomen: soft NT ND + BS + BM  Extremities: trace edema b/l LE + ace wrap to Rt leg   Incisions: MSI C/D/I  stable sternum + mepilex + PW             I&O's Detail    2018 07:01  -  2018 07:00  --------------------------------------------------------  IN:    Oral Fluid: 560 mL  Total IN: 560 mL    OUT:    Chest Tube: 10 mL    Indwelling Catheter - Urethral: 30 mL    Voided: 1450 mL  Total OUT: 1490 mL    Total NET: -930 mL          LABS      140  |  104  |  23.0<H>  ----------------------------<  123<H>  4.6   |  25.0  |  0.99    Ca    8.2<L>      2018 06:05  Mg     2.3                                        8.2    12.2  )-----------( 133      ( 2018 06:05 )             24.7                     CAPILLARY BLOOD GLUCOSE      POCT Blood Glucose.: 121 mg/dL (2018 08:36)  POCT Blood Glucose.: 133 mg/dL (2018 20:54)  POCT Blood Glucose.: 163 mg/dL (2018 17:21)  POCT Blood Glucose.: 166 mg/dL (2018 15:41)  POCT Blood Glucose.: 187 mg/dL (2018 12:18)           Today's CXR: < from: Xray Chest 1 View- PORTABLE-Routine (18 @ 05:02) >  Single frontal view of the chest demonstrates the lungs to be clear. The   cardiomediastinal silhouette is enlarged. No acute osseous abnormalities.   Overlying EKG leads and wires are noted. Status post CABG procedure.   Status post right-sided central venous catheter removal.    IMPRESSION: Status post right-sided central venous catheter removal.    < end of copied text >       PAST MEDICAL & SURGICAL HISTORY:  HLD (hyperlipidemia)  HTN (hypertension)  IDDM (insulin dependent diabetes mellitus)  No significant past surgical history

## 2018-07-13 DIAGNOSIS — D64.9 ANEMIA, UNSPECIFIED: ICD-10-CM

## 2018-07-13 LAB
ALBUMIN SERPL ELPH-MCNC: 3.7 G/DL — SIGNIFICANT CHANGE UP (ref 3.3–5.2)
ALP SERPL-CCNC: 49 U/L — SIGNIFICANT CHANGE UP (ref 40–120)
ALT FLD-CCNC: 24 U/L — SIGNIFICANT CHANGE UP
ANION GAP SERPL CALC-SCNC: 13 MMOL/L — SIGNIFICANT CHANGE UP (ref 5–17)
ANION GAP SERPL CALC-SCNC: 15 MMOL/L — SIGNIFICANT CHANGE UP (ref 5–17)
AST SERPL-CCNC: 19 U/L — SIGNIFICANT CHANGE UP
BASOPHILS # BLD AUTO: 0 K/UL — SIGNIFICANT CHANGE UP (ref 0–0.2)
BASOPHILS NFR BLD AUTO: 0.2 % — SIGNIFICANT CHANGE UP (ref 0–2)
BILIRUB SERPL-MCNC: 0.4 MG/DL — SIGNIFICANT CHANGE UP (ref 0.4–2)
BUN SERPL-MCNC: 22 MG/DL — HIGH (ref 8–20)
BUN SERPL-MCNC: 22 MG/DL — HIGH (ref 8–20)
CALCIUM SERPL-MCNC: 8.5 MG/DL — LOW (ref 8.6–10.2)
CALCIUM SERPL-MCNC: 8.9 MG/DL — SIGNIFICANT CHANGE UP (ref 8.6–10.2)
CHLORIDE SERPL-SCNC: 100 MMOL/L — SIGNIFICANT CHANGE UP (ref 98–107)
CHLORIDE SERPL-SCNC: 101 MMOL/L — SIGNIFICANT CHANGE UP (ref 98–107)
CK MB CFR SERPL CALC: 3.4 NG/ML — SIGNIFICANT CHANGE UP (ref 0–6.7)
CK SERPL-CCNC: 225 U/L — HIGH (ref 30–200)
CO2 SERPL-SCNC: 22 MMOL/L — SIGNIFICANT CHANGE UP (ref 22–29)
CO2 SERPL-SCNC: 25 MMOL/L — SIGNIFICANT CHANGE UP (ref 22–29)
CREAT SERPL-MCNC: 0.92 MG/DL — SIGNIFICANT CHANGE UP (ref 0.5–1.3)
CREAT SERPL-MCNC: 0.94 MG/DL — SIGNIFICANT CHANGE UP (ref 0.5–1.3)
EOSINOPHIL # BLD AUTO: 0.3 K/UL — SIGNIFICANT CHANGE UP (ref 0–0.5)
EOSINOPHIL NFR BLD AUTO: 2.9 % — SIGNIFICANT CHANGE UP (ref 0–5)
GLUCOSE BLDC GLUCOMTR-MCNC: 116 MG/DL — HIGH (ref 70–99)
GLUCOSE BLDC GLUCOMTR-MCNC: 150 MG/DL — HIGH (ref 70–99)
GLUCOSE BLDC GLUCOMTR-MCNC: 178 MG/DL — HIGH (ref 70–99)
GLUCOSE BLDC GLUCOMTR-MCNC: 213 MG/DL — HIGH (ref 70–99)
GLUCOSE SERPL-MCNC: 121 MG/DL — HIGH (ref 70–115)
GLUCOSE SERPL-MCNC: 213 MG/DL — HIGH (ref 70–115)
HCT VFR BLD CALC: 25 % — LOW (ref 42–52)
HCT VFR BLD CALC: 26.1 % — LOW (ref 42–52)
HGB BLD-MCNC: 8.4 G/DL — LOW (ref 14–18)
HGB BLD-MCNC: 8.6 G/DL — LOW (ref 14–18)
LYMPHOCYTES # BLD AUTO: 2.6 K/UL — SIGNIFICANT CHANGE UP (ref 1–4.8)
LYMPHOCYTES # BLD AUTO: 28.4 % — SIGNIFICANT CHANGE UP (ref 20–55)
MAGNESIUM SERPL-MCNC: 1.8 MG/DL — SIGNIFICANT CHANGE UP (ref 1.6–2.6)
MAGNESIUM SERPL-MCNC: 2 MG/DL — SIGNIFICANT CHANGE UP (ref 1.6–2.6)
MCHC RBC-ENTMCNC: 27.9 PG — SIGNIFICANT CHANGE UP (ref 27–31)
MCHC RBC-ENTMCNC: 28.4 PG — SIGNIFICANT CHANGE UP (ref 27–31)
MCHC RBC-ENTMCNC: 33 G/DL — SIGNIFICANT CHANGE UP (ref 32–36)
MCHC RBC-ENTMCNC: 33.6 G/DL — SIGNIFICANT CHANGE UP (ref 32–36)
MCV RBC AUTO: 84.5 FL — SIGNIFICANT CHANGE UP (ref 80–94)
MCV RBC AUTO: 84.7 FL — SIGNIFICANT CHANGE UP (ref 80–94)
MONOCYTES # BLD AUTO: 0.9 K/UL — HIGH (ref 0–0.8)
MONOCYTES NFR BLD AUTO: 9.9 % — SIGNIFICANT CHANGE UP (ref 3–10)
NEUTROPHILS # BLD AUTO: 5.3 K/UL — SIGNIFICANT CHANGE UP (ref 1.8–8)
NEUTROPHILS NFR BLD AUTO: 57.3 % — SIGNIFICANT CHANGE UP (ref 37–73)
PHOSPHATE SERPL-MCNC: 2.4 MG/DL — SIGNIFICANT CHANGE UP (ref 2.4–4.7)
PLATELET # BLD AUTO: 184 K/UL — SIGNIFICANT CHANGE UP (ref 150–400)
PLATELET # BLD AUTO: 201 K/UL — SIGNIFICANT CHANGE UP (ref 150–400)
POTASSIUM SERPL-MCNC: 4.1 MMOL/L — SIGNIFICANT CHANGE UP (ref 3.5–5.3)
POTASSIUM SERPL-MCNC: 4.2 MMOL/L — SIGNIFICANT CHANGE UP (ref 3.5–5.3)
POTASSIUM SERPL-SCNC: 4.1 MMOL/L — SIGNIFICANT CHANGE UP (ref 3.5–5.3)
POTASSIUM SERPL-SCNC: 4.2 MMOL/L — SIGNIFICANT CHANGE UP (ref 3.5–5.3)
PROT SERPL-MCNC: 6.2 G/DL — LOW (ref 6.6–8.7)
RBC # BLD: 2.96 M/UL — LOW (ref 4.6–6.2)
RBC # BLD: 3.08 M/UL — LOW (ref 4.6–6.2)
RBC # FLD: 13.9 % — SIGNIFICANT CHANGE UP (ref 11–15.6)
RBC # FLD: 14 % — SIGNIFICANT CHANGE UP (ref 11–15.6)
SODIUM SERPL-SCNC: 137 MMOL/L — SIGNIFICANT CHANGE UP (ref 135–145)
SODIUM SERPL-SCNC: 139 MMOL/L — SIGNIFICANT CHANGE UP (ref 135–145)
SRA INTERP SER-IMP: SIGNIFICANT CHANGE UP
TROPONIN T SERPL-MCNC: 0.3 NG/ML — HIGH (ref 0–0.06)
WBC # BLD: 9.2 K/UL — SIGNIFICANT CHANGE UP (ref 4.8–10.8)
WBC # BLD: 9.3 K/UL — SIGNIFICANT CHANGE UP (ref 4.8–10.8)
WBC # FLD AUTO: 9.2 K/UL — SIGNIFICANT CHANGE UP (ref 4.8–10.8)
WBC # FLD AUTO: 9.3 K/UL — SIGNIFICANT CHANGE UP (ref 4.8–10.8)

## 2018-07-13 PROCEDURE — 93010 ELECTROCARDIOGRAM REPORT: CPT

## 2018-07-13 PROCEDURE — 71045 X-RAY EXAM CHEST 1 VIEW: CPT | Mod: 26

## 2018-07-13 PROCEDURE — 93308 TTE F-UP OR LMTD: CPT | Mod: 26

## 2018-07-13 RX ORDER — SODIUM CHLORIDE 9 MG/ML
250 INJECTION INTRAMUSCULAR; INTRAVENOUS; SUBCUTANEOUS ONCE
Qty: 0 | Refills: 0 | Status: COMPLETED | OUTPATIENT
Start: 2018-07-13 | End: 2018-07-13

## 2018-07-13 RX ORDER — METFORMIN HYDROCHLORIDE 850 MG/1
1000 TABLET ORAL
Qty: 0 | Refills: 0 | Status: DISCONTINUED | OUTPATIENT
Start: 2018-07-13 | End: 2018-07-14

## 2018-07-13 RX ORDER — SODIUM CHLORIDE 9 MG/ML
500 INJECTION, SOLUTION INTRAVENOUS ONCE
Qty: 0 | Refills: 0 | Status: DISCONTINUED | OUTPATIENT
Start: 2018-07-13 | End: 2018-07-13

## 2018-07-13 RX ORDER — MAGNESIUM OXIDE 400 MG ORAL TABLET 241.3 MG
400 TABLET ORAL ONCE
Qty: 0 | Refills: 0 | Status: COMPLETED | OUTPATIENT
Start: 2018-07-13 | End: 2018-07-13

## 2018-07-13 RX ORDER — POTASSIUM CHLORIDE 20 MEQ
20 PACKET (EA) ORAL ONCE
Qty: 0 | Refills: 0 | Status: COMPLETED | OUTPATIENT
Start: 2018-07-13 | End: 2018-07-13

## 2018-07-13 RX ADMIN — Medication 325 MILLIGRAM(S): at 21:36

## 2018-07-13 RX ADMIN — INSULIN GLARGINE 20 UNIT(S): 100 INJECTION, SOLUTION SUBCUTANEOUS at 21:36

## 2018-07-13 RX ADMIN — CLOPIDOGREL BISULFATE 75 MILLIGRAM(S): 75 TABLET, FILM COATED ORAL at 12:23

## 2018-07-13 RX ADMIN — METFORMIN HYDROCHLORIDE 1000 MILLIGRAM(S): 850 TABLET ORAL at 17:40

## 2018-07-13 RX ADMIN — Medication 4: at 17:40

## 2018-07-13 RX ADMIN — OXYCODONE AND ACETAMINOPHEN 1 TABLET(S): 5; 325 TABLET ORAL at 18:14

## 2018-07-13 RX ADMIN — METFORMIN HYDROCHLORIDE 850 MILLIGRAM(S): 850 TABLET ORAL at 08:14

## 2018-07-13 RX ADMIN — SODIUM CHLORIDE 750 MILLILITER(S): 9 INJECTION INTRAMUSCULAR; INTRAVENOUS; SUBCUTANEOUS at 15:50

## 2018-07-13 RX ADMIN — Medication 20 MILLIEQUIVALENT(S): at 18:44

## 2018-07-13 RX ADMIN — OXYCODONE AND ACETAMINOPHEN 1 TABLET(S): 5; 325 TABLET ORAL at 17:49

## 2018-07-13 RX ADMIN — MAGNESIUM OXIDE 400 MG ORAL TABLET 400 MILLIGRAM(S): 241.3 TABLET ORAL at 18:44

## 2018-07-13 RX ADMIN — Medication 1 TABLET(S): at 12:23

## 2018-07-13 RX ADMIN — ATENOLOL 50 MILLIGRAM(S): 25 TABLET ORAL at 05:02

## 2018-07-13 RX ADMIN — SODIUM CHLORIDE 3 MILLILITER(S): 9 INJECTION INTRAMUSCULAR; INTRAVENOUS; SUBCUTANEOUS at 10:57

## 2018-07-13 RX ADMIN — ATENOLOL 50 MILLIGRAM(S): 25 TABLET ORAL at 17:40

## 2018-07-13 RX ADMIN — PANTOPRAZOLE SODIUM 40 MILLIGRAM(S): 20 TABLET, DELAYED RELEASE ORAL at 12:23

## 2018-07-13 RX ADMIN — ATORVASTATIN CALCIUM 40 MILLIGRAM(S): 80 TABLET, FILM COATED ORAL at 21:35

## 2018-07-13 RX ADMIN — Medication 325 MILLIGRAM(S): at 05:02

## 2018-07-13 RX ADMIN — SODIUM CHLORIDE 3 MILLILITER(S): 9 INJECTION INTRAMUSCULAR; INTRAVENOUS; SUBCUTANEOUS at 21:37

## 2018-07-13 RX ADMIN — Medication 500 MILLIGRAM(S): at 05:02

## 2018-07-13 RX ADMIN — SODIUM CHLORIDE 3 MILLILITER(S): 9 INJECTION INTRAMUSCULAR; INTRAVENOUS; SUBCUTANEOUS at 05:02

## 2018-07-13 RX ADMIN — Medication 325 MILLIGRAM(S): at 14:01

## 2018-07-13 RX ADMIN — Medication 1 MILLIGRAM(S): at 12:23

## 2018-07-13 RX ADMIN — Medication 2: at 12:25

## 2018-07-13 RX ADMIN — Medication 500 MILLIGRAM(S): at 21:36

## 2018-07-13 RX ADMIN — OXYCODONE AND ACETAMINOPHEN 2 TABLET(S): 5; 325 TABLET ORAL at 05:02

## 2018-07-13 RX ADMIN — Medication 325 MILLIGRAM(S): at 12:24

## 2018-07-13 RX ADMIN — Medication 500 MILLIGRAM(S): at 12:28

## 2018-07-13 RX ADMIN — OXYCODONE AND ACETAMINOPHEN 2 TABLET(S): 5; 325 TABLET ORAL at 06:38

## 2018-07-13 RX ADMIN — ENOXAPARIN SODIUM 40 MILLIGRAM(S): 100 INJECTION SUBCUTANEOUS at 21:36

## 2018-07-13 NOTE — CHART NOTE - NSCHARTNOTEFT_GEN_A_CORE
CJ Butler called to patient's bedside for hypotension 76/60 with patient complaining of dizziness/ feeling cold    Patient immediately put back to bed; called for fluids; manual BP checked by provider found to be 120/70 & 110/68  Patient still complaining of not feeling right (dizzy) and as such labs ordered; 500 LR to be completed; and stat echo ordered to rule out effusion as pacing wires where taken out today  HR on monitor noted to be SR 90s; medications reviewed currently on Atenolol 50 BID to be reassessed after echo; uncertain is labile pressure, orthostatic or user error     48 M with history of HTN, HLD, IDDM A1C 7.8 now POD 4 from CABG (Om, Diag, Lad, Rpda) initially presenting with chest pain post op course insignificant had been tolerating betablocker switched to Atenolol for rate control; also on asa, plavix and lipitor for graft patency now with one isolated BP of 76/60; currently HD stable   -echo stat  -labs pending   -will continue to monitor     To discuss with team

## 2018-07-13 NOTE — CDI QUERY NOTE - NSCDIDEBRIDETXTBX_GEN_ALL_CORE_HH
Cardiology Note from 7/8 documents - Past medical history: No updates.   Chronic conditions:  Hypertension: controlled. CHF: Stable. CAD: Stable ischemic heart disease. DM: Stable;    - TTE shows EF 55-60%, PATIENT ON IV LASIX ON THIS ADMISSION    (A) Please document the type and acuity of CHF in your progress notes -    (1) Acute, (b) Acute on Chronic, (c) Chronic  (2) Diastolic (b) Systolic (c) Combined, (d) No Heart Failure   (3) Other indication for IV Lasix (please specify)   (4)Clinically Undetermined

## 2018-07-13 NOTE — CDI QUERY NOTE - NSCDIOTHERTXTBX_GEN_ALL_CORE_HH
(A) Patient with IDDM, BS on this admission 143->189->88->231 -       Please link a medical diagnosis with the above clinical indicators      (B) Patient on Epinephrine from 7/9/18 07:48 to 7/10 3:54, s/p 4 vials of albumin, documented with postop hypotension.  Please clarify the likely etiology of the postop hypotension -  (1) Hypovolemic Shock from Cardioplegia  (2) Cardiogenic Shock from   (3) Other (please specify)  (4) Clinically Undetermined)    Thank you for your help,    Sakina   CDI Specialist

## 2018-07-13 NOTE — PROGRESS NOTE ADULT - PROBLEM SELECTOR PLAN 1
cont atenolol   cont lipitor for chronic graft patency prophylaxis  Encourage PO intake  Encourage OOB to chair and ambulation with PT  Encourage deep breathing exercised and coughing  Chest PT and IS with nursing staff  d/c PW today   plan to discharge home tomorrow  DW CT Team in am rounds  [facility] CT team in am rounds

## 2018-07-13 NOTE — CDI QUERY NOTE - NSCDICHFTXTBX_GEN_ALL_CORE_HH
Cardiology Note from 7/8 documents - Past medical history: No updates.   Chronic conditions:  Hypertension: controlled. CHF: Stable. CAD: Stable ischemic heart disease. DM: Stable;    - TTE shows EF 55-60%, PATIENT ON IV LASIX ON THIS ADMISSION

## 2018-07-13 NOTE — PROGRESS NOTE ADULT - SUBJECTIVE AND OBJECTIVE BOX
Subjective: "  I feel better everyday"  Pt in bed NAD, needs alot of encouragement     VITAL SIGNS  T(C): 37 (18 @ 05:01), Max: 37.1 (18 @ 10:30)  HR: 84 (18 @ 09:04) (84 - 111)  BP: 96/73 (18 @ 09:04) (96/73 - 118/78)  RR: 18 (18 @ 09:04) (18 - 20)  SpO2: 97% (18 @ 09:04) (96% - 100%)RA   Daily     Daily Weight in k (2018 05:31)  Admit Wt: Drug Dosing Weight  Height (cm): 160.02 (2018 05:55)  Weight (kg): 61.2 (2018 05:55)    Telemetry:  SR    LVEF: 55%    MEDICATIONS  ascorbic acid 500 milliGRAM(s) Oral three times a day  aspirin enteric coated 325 milliGRAM(s) Oral daily  ATENolol  Tablet 50 milliGRAM(s) Oral two times a day  atorvastatin 40 milliGRAM(s) Oral at bedtime  benzocaine 15 mG/menthol 3.6 mG Lozenge 1 Lozenge Oral every 3 hours PRN  clopidogrel Tablet 75 milliGRAM(s) Oral daily  dextrose 50% Injectable 12.5 Gram(s) IV Push once  dextrose 50% Injectable 25 Gram(s) IV Push once  dextrose 50% Injectable 25 Gram(s) IV Push once  docusate sodium 100 milliGRAM(s) Oral three times a day  enoxaparin Injectable 40 milliGRAM(s) SubCutaneous daily  ferrous    sulfate 325 milliGRAM(s) Oral three times a day  folic acid 1 milliGRAM(s) Oral daily  insulin glargine Injectable (LANTUS) 20 Unit(s) SubCutaneous at bedtime  insulin lispro (HumaLOG) corrective regimen sliding scale   SubCutaneous Before meals and at bedtime  metFORMIN 850 milliGRAM(s) Oral two times a day  multivitamin 1 Tablet(s) Oral daily  oxyCODONE    5 mG/acetaminophen 325 mG 1 Tablet(s) Oral every 4 hours PRN  oxyCODONE    5 mG/acetaminophen 325 mG 2 Tablet(s) Oral every 4 hours PRN  pantoprazole    Tablet 40 milliGRAM(s) Oral daily  polyethylene glycol 3350 17 Gram(s) Oral daily  senna 2 Tablet(s) Oral at bedtime  sodium chloride 0.9% lock flush 3 milliLiter(s) IV Push every 8 hours    MEDICATIONS  (PRN):  benzocaine 15 mG/menthol 3.6 mG Lozenge 1 Lozenge Oral every 3 hours PRN Sore Throat  oxyCODONE    5 mG/acetaminophen 325 mG 1 Tablet(s) Oral every 4 hours PRN Moderate Pain (4 - 6)  oxyCODONE    5 mG/acetaminophen 325 mG 2 Tablet(s) Oral every 4 hours PRN Severe Pain (7 - 10)        PHYSICAL EXAM  General: alert Awake NAD   Respiratory: decreased b/l, but clear   CV: RRR S1 S2   Abdomen: soft NT ND + BS + BM  Extremities: trace edema b/l LE + ace wrap to Rt leg   Incisions: MSI C/D/I  stable sternum + mepilex + PW         I&O's Detail    2018 07:01  -  2018 07:00  --------------------------------------------------------  IN:    Oral Fluid: 480 mL  Total IN: 480 mL    OUT:    Voided: 500 mL  Total OUT: 500 mL    Total NET: -20 mL          LABS      139  |  101  |  22.0<H>  ----------------------------<  121<H>  4.2   |  25.0  |  0.92    Ca    8.5<L>      2018 05:55  Phos  2.4       Mg     2.0                                        8.4    9.3   )-----------( 184      ( 2018 05:55 )             25.0                     CAPILLARY BLOOD GLUCOSE      POCT Blood Glucose.: 116 mg/dL (2018 08:04)  POCT Blood Glucose.: 161 mg/dL (2018 21:10)  POCT Blood Glucose.: 194 mg/dL (2018 17:16)  POCT Blood Glucose.: 260 mg/dL (2018 12:07)           Today's CXR:  NA     PAST MEDICAL & SURGICAL HISTORY:  HLD (hyperlipidemia)  HTN (hypertension)  IDDM (insulin dependent diabetes mellitus)  No significant past surgical history

## 2018-07-14 VITALS
SYSTOLIC BLOOD PRESSURE: 98 MMHG | OXYGEN SATURATION: 100 % | RESPIRATION RATE: 17 BRPM | TEMPERATURE: 98 F | HEART RATE: 80 BPM | DIASTOLIC BLOOD PRESSURE: 68 MMHG

## 2018-07-14 LAB
ANION GAP SERPL CALC-SCNC: 12 MMOL/L — SIGNIFICANT CHANGE UP (ref 5–17)
ANISOCYTOSIS BLD QL: SLIGHT — SIGNIFICANT CHANGE UP
BASOPHILS # BLD AUTO: 0 K/UL — SIGNIFICANT CHANGE UP (ref 0–0.2)
BASOPHILS NFR BLD AUTO: 0 % — SIGNIFICANT CHANGE UP (ref 0–2)
BUN SERPL-MCNC: 20 MG/DL — SIGNIFICANT CHANGE UP (ref 8–20)
CALCIUM SERPL-MCNC: 8.8 MG/DL — SIGNIFICANT CHANGE UP (ref 8.6–10.2)
CHLORIDE SERPL-SCNC: 103 MMOL/L — SIGNIFICANT CHANGE UP (ref 98–107)
CO2 SERPL-SCNC: 24 MMOL/L — SIGNIFICANT CHANGE UP (ref 22–29)
CREAT SERPL-MCNC: 0.85 MG/DL — SIGNIFICANT CHANGE UP (ref 0.5–1.3)
EOSINOPHIL # BLD AUTO: 0.4 K/UL — SIGNIFICANT CHANGE UP (ref 0–0.5)
EOSINOPHIL NFR BLD AUTO: 2 % — SIGNIFICANT CHANGE UP (ref 0–6)
GLUCOSE BLDC GLUCOMTR-MCNC: 115 MG/DL — HIGH (ref 70–99)
GLUCOSE BLDC GLUCOMTR-MCNC: 239 MG/DL — HIGH (ref 70–99)
GLUCOSE SERPL-MCNC: 124 MG/DL — HIGH (ref 70–115)
HCT VFR BLD CALC: 26.1 % — LOW (ref 42–52)
HGB BLD-MCNC: 8.7 G/DL — LOW (ref 14–18)
LYMPHOCYTES # BLD AUTO: 2.8 K/UL — SIGNIFICANT CHANGE UP (ref 1–4.8)
LYMPHOCYTES # BLD AUTO: 35 % — SIGNIFICANT CHANGE UP (ref 20–55)
MACROCYTES BLD QL: SLIGHT — SIGNIFICANT CHANGE UP
MAGNESIUM SERPL-MCNC: 2 MG/DL — SIGNIFICANT CHANGE UP (ref 1.6–2.6)
MCHC RBC-ENTMCNC: 28.3 PG — SIGNIFICANT CHANGE UP (ref 27–31)
MCHC RBC-ENTMCNC: 33.3 G/DL — SIGNIFICANT CHANGE UP (ref 32–36)
MCV RBC AUTO: 85 FL — SIGNIFICANT CHANGE UP (ref 80–94)
MICROCYTES BLD QL: SLIGHT — SIGNIFICANT CHANGE UP
MONOCYTES # BLD AUTO: 0.8 K/UL — SIGNIFICANT CHANGE UP (ref 0–0.8)
MONOCYTES NFR BLD AUTO: 8 % — SIGNIFICANT CHANGE UP (ref 3–10)
NEUTROPHILS # BLD AUTO: 4.6 K/UL — SIGNIFICANT CHANGE UP (ref 1.8–8)
NEUTROPHILS NFR BLD AUTO: 55 % — SIGNIFICANT CHANGE UP (ref 37–73)
OVALOCYTES BLD QL SMEAR: SLIGHT — SIGNIFICANT CHANGE UP
PHOSPHATE SERPL-MCNC: 4.1 MG/DL — SIGNIFICANT CHANGE UP (ref 2.4–4.7)
PLAT MORPH BLD: NORMAL — SIGNIFICANT CHANGE UP
PLATELET # BLD AUTO: 235 K/UL — SIGNIFICANT CHANGE UP (ref 150–400)
POIKILOCYTOSIS BLD QL AUTO: SLIGHT — SIGNIFICANT CHANGE UP
POLYCHROMASIA BLD QL SMEAR: SLIGHT — SIGNIFICANT CHANGE UP
POTASSIUM SERPL-MCNC: 4.7 MMOL/L — SIGNIFICANT CHANGE UP (ref 3.5–5.3)
POTASSIUM SERPL-SCNC: 4.7 MMOL/L — SIGNIFICANT CHANGE UP (ref 3.5–5.3)
RBC # BLD: 3.07 M/UL — LOW (ref 4.6–6.2)
RBC # FLD: 14.1 % — SIGNIFICANT CHANGE UP (ref 11–15.6)
RBC BLD AUTO: ABNORMAL
SODIUM SERPL-SCNC: 139 MMOL/L — SIGNIFICANT CHANGE UP (ref 135–145)
WBC # BLD: 8.7 K/UL — SIGNIFICANT CHANGE UP (ref 4.8–10.8)
WBC # FLD AUTO: 8.7 K/UL — SIGNIFICANT CHANGE UP (ref 4.8–10.8)

## 2018-07-14 PROCEDURE — 86923 COMPATIBILITY TEST ELECTRIC: CPT

## 2018-07-14 PROCEDURE — 93312 ECHO TRANSESOPHAGEAL: CPT

## 2018-07-14 PROCEDURE — 83605 ASSAY OF LACTIC ACID: CPT

## 2018-07-14 PROCEDURE — 93458 L HRT ARTERY/VENTRICLE ANGIO: CPT

## 2018-07-14 PROCEDURE — 82330 ASSAY OF CALCIUM: CPT

## 2018-07-14 PROCEDURE — 93308 TTE F-UP OR LMTD: CPT

## 2018-07-14 PROCEDURE — 99152 MOD SED SAME PHYS/QHP 5/>YRS: CPT

## 2018-07-14 PROCEDURE — 94770: CPT

## 2018-07-14 PROCEDURE — 85014 HEMATOCRIT: CPT

## 2018-07-14 PROCEDURE — 94010 BREATHING CAPACITY TEST: CPT

## 2018-07-14 PROCEDURE — 84134 ASSAY OF PREALBUMIN: CPT

## 2018-07-14 PROCEDURE — 97163 PT EVAL HIGH COMPLEX 45 MIN: CPT

## 2018-07-14 PROCEDURE — 86850 RBC ANTIBODY SCREEN: CPT

## 2018-07-14 PROCEDURE — 93005 ELECTROCARDIOGRAM TRACING: CPT

## 2018-07-14 PROCEDURE — 80053 COMPREHEN METABOLIC PANEL: CPT

## 2018-07-14 PROCEDURE — 84100 ASSAY OF PHOSPHORUS: CPT

## 2018-07-14 PROCEDURE — C1894: CPT

## 2018-07-14 PROCEDURE — 86022 PLATELET ANTIBODIES: CPT

## 2018-07-14 PROCEDURE — 81001 URINALYSIS AUTO W/SCOPE: CPT

## 2018-07-14 PROCEDURE — 93320 DOPPLER ECHO COMPLETE: CPT

## 2018-07-14 PROCEDURE — 85027 COMPLETE CBC AUTOMATED: CPT

## 2018-07-14 PROCEDURE — C1769: CPT

## 2018-07-14 PROCEDURE — 83880 ASSAY OF NATRIURETIC PEPTIDE: CPT

## 2018-07-14 PROCEDURE — 86901 BLOOD TYPING SEROLOGIC RH(D): CPT

## 2018-07-14 PROCEDURE — 82553 CREATINE MB FRACTION: CPT

## 2018-07-14 PROCEDURE — 93306 TTE W/DOPPLER COMPLETE: CPT

## 2018-07-14 PROCEDURE — 84295 ASSAY OF SERUM SODIUM: CPT

## 2018-07-14 PROCEDURE — 99285 EMERGENCY DEPT VISIT HI MDM: CPT

## 2018-07-14 PROCEDURE — 83735 ASSAY OF MAGNESIUM: CPT

## 2018-07-14 PROCEDURE — C1887: CPT

## 2018-07-14 PROCEDURE — 36415 COLL VENOUS BLD VENIPUNCTURE: CPT

## 2018-07-14 PROCEDURE — 87641 MR-STAPH DNA AMP PROBE: CPT

## 2018-07-14 PROCEDURE — 83036 HEMOGLOBIN GLYCOSYLATED A1C: CPT

## 2018-07-14 PROCEDURE — 93880 EXTRACRANIAL BILAT STUDY: CPT

## 2018-07-14 PROCEDURE — 94002 VENT MGMT INPAT INIT DAY: CPT

## 2018-07-14 PROCEDURE — 97530 THERAPEUTIC ACTIVITIES: CPT

## 2018-07-14 PROCEDURE — C1889: CPT

## 2018-07-14 PROCEDURE — 82947 ASSAY GLUCOSE BLOOD QUANT: CPT

## 2018-07-14 PROCEDURE — 94760 N-INVAS EAR/PLS OXIMETRY 1: CPT

## 2018-07-14 PROCEDURE — P9045: CPT

## 2018-07-14 PROCEDURE — 85379 FIBRIN DEGRADATION QUANT: CPT

## 2018-07-14 PROCEDURE — 86900 BLOOD TYPING SEROLOGIC ABO: CPT

## 2018-07-14 PROCEDURE — 84443 ASSAY THYROID STIM HORMONE: CPT

## 2018-07-14 PROCEDURE — 93325 DOPPLER ECHO COLOR FLOW MAPG: CPT

## 2018-07-14 PROCEDURE — 80048 BASIC METABOLIC PNL TOTAL CA: CPT

## 2018-07-14 PROCEDURE — 87640 STAPH A DNA AMP PROBE: CPT

## 2018-07-14 PROCEDURE — 99239 HOSP IP/OBS DSCHRG MGMT >30: CPT | Mod: 24

## 2018-07-14 PROCEDURE — 82803 BLOOD GASES ANY COMBINATION: CPT

## 2018-07-14 PROCEDURE — 97110 THERAPEUTIC EXERCISES: CPT

## 2018-07-14 PROCEDURE — 85730 THROMBOPLASTIN TIME PARTIAL: CPT

## 2018-07-14 PROCEDURE — 71045 X-RAY EXAM CHEST 1 VIEW: CPT

## 2018-07-14 PROCEDURE — 85610 PROTHROMBIN TIME: CPT

## 2018-07-14 PROCEDURE — 84484 ASSAY OF TROPONIN QUANT: CPT

## 2018-07-14 PROCEDURE — 71045 X-RAY EXAM CHEST 1 VIEW: CPT | Mod: 26

## 2018-07-14 PROCEDURE — 84132 ASSAY OF SERUM POTASSIUM: CPT

## 2018-07-14 PROCEDURE — 82962 GLUCOSE BLOOD TEST: CPT

## 2018-07-14 PROCEDURE — 82435 ASSAY OF BLOOD CHLORIDE: CPT

## 2018-07-14 PROCEDURE — 82550 ASSAY OF CK (CPK): CPT

## 2018-07-14 PROCEDURE — 97116 GAIT TRAINING THERAPY: CPT

## 2018-07-14 RX ORDER — DOCUSATE SODIUM 100 MG
1 CAPSULE ORAL
Qty: 0 | Refills: 0 | DISCHARGE
Start: 2018-07-14

## 2018-07-14 RX ORDER — METFORMIN HYDROCHLORIDE 850 MG/1
1 TABLET ORAL
Qty: 60 | Refills: 1
Start: 2018-07-14 | End: 2018-09-11

## 2018-07-14 RX ORDER — PANTOPRAZOLE SODIUM 20 MG/1
1 TABLET, DELAYED RELEASE ORAL
Qty: 14 | Refills: 0
Start: 2018-07-14 | End: 2018-07-27

## 2018-07-14 RX ORDER — CLOPIDOGREL BISULFATE 75 MG/1
1 TABLET, FILM COATED ORAL
Qty: 30 | Refills: 1
Start: 2018-07-14 | End: 2018-09-11

## 2018-07-14 RX ORDER — ASPIRIN/CALCIUM CARB/MAGNESIUM 324 MG
1 TABLET ORAL
Qty: 0 | Refills: 0 | COMMUNITY

## 2018-07-14 RX ORDER — FOLIC ACID 0.8 MG
1 TABLET ORAL
Qty: 0 | Refills: 0 | DISCHARGE
Start: 2018-07-14

## 2018-07-14 RX ORDER — ATENOLOL 25 MG/1
1 TABLET ORAL
Qty: 60 | Refills: 1
Start: 2018-07-14 | End: 2018-09-11

## 2018-07-14 RX ORDER — SENNA PLUS 8.6 MG/1
2 TABLET ORAL
Qty: 0 | Refills: 0 | DISCHARGE
Start: 2018-07-14

## 2018-07-14 RX ORDER — ASCORBIC ACID 60 MG
1 TABLET,CHEWABLE ORAL
Qty: 0 | Refills: 0 | DISCHARGE
Start: 2018-07-14

## 2018-07-14 RX ORDER — ASPIRIN/CALCIUM CARB/MAGNESIUM 324 MG
1 TABLET ORAL
Qty: 0 | Refills: 0 | DISCHARGE
Start: 2018-07-14

## 2018-07-14 RX ORDER — FERROUS SULFATE 325(65) MG
0 TABLET ORAL
Qty: 0 | Refills: 0 | DISCHARGE
Start: 2018-07-14

## 2018-07-14 RX ORDER — METFORMIN HYDROCHLORIDE 850 MG/1
1 TABLET ORAL
Qty: 0 | Refills: 0 | COMMUNITY

## 2018-07-14 RX ORDER — INSULIN GLARGINE 100 [IU]/ML
16 INJECTION, SOLUTION SUBCUTANEOUS
Qty: 0 | Refills: 0 | COMMUNITY

## 2018-07-14 RX ORDER — INSULIN GLARGINE 100 [IU]/ML
20 INJECTION, SOLUTION SUBCUTANEOUS
Qty: 100 | Refills: 0
Start: 2018-07-14

## 2018-07-14 RX ORDER — POLYETHYLENE GLYCOL 3350 17 G/17G
17 POWDER, FOR SOLUTION ORAL
Qty: 0 | Refills: 0 | DISCHARGE
Start: 2018-07-14

## 2018-07-14 RX ORDER — LOSARTAN/HYDROCHLOROTHIAZIDE 100MG-25MG
1 TABLET ORAL
Qty: 0 | Refills: 0 | COMMUNITY

## 2018-07-14 RX ADMIN — OXYCODONE AND ACETAMINOPHEN 1 TABLET(S): 5; 325 TABLET ORAL at 10:11

## 2018-07-14 RX ADMIN — OXYCODONE AND ACETAMINOPHEN 2 TABLET(S): 5; 325 TABLET ORAL at 00:37

## 2018-07-14 RX ADMIN — Medication 1 MILLIGRAM(S): at 08:44

## 2018-07-14 RX ADMIN — Medication 325 MILLIGRAM(S): at 08:44

## 2018-07-14 RX ADMIN — Medication 500 MILLIGRAM(S): at 08:44

## 2018-07-14 RX ADMIN — METFORMIN HYDROCHLORIDE 1000 MILLIGRAM(S): 850 TABLET ORAL at 04:58

## 2018-07-14 RX ADMIN — SODIUM CHLORIDE 3 MILLILITER(S): 9 INJECTION INTRAMUSCULAR; INTRAVENOUS; SUBCUTANEOUS at 08:46

## 2018-07-14 RX ADMIN — Medication 325 MILLIGRAM(S): at 04:58

## 2018-07-14 RX ADMIN — Medication 4: at 12:27

## 2018-07-14 RX ADMIN — OXYCODONE AND ACETAMINOPHEN 1 TABLET(S): 5; 325 TABLET ORAL at 08:44

## 2018-07-14 RX ADMIN — Medication 1 TABLET(S): at 08:44

## 2018-07-14 RX ADMIN — Medication 100 MILLIGRAM(S): at 04:56

## 2018-07-14 RX ADMIN — CLOPIDOGREL BISULFATE 75 MILLIGRAM(S): 75 TABLET, FILM COATED ORAL at 08:44

## 2018-07-14 RX ADMIN — Medication 500 MILLIGRAM(S): at 04:57

## 2018-07-14 RX ADMIN — SODIUM CHLORIDE 3 MILLILITER(S): 9 INJECTION INTRAMUSCULAR; INTRAVENOUS; SUBCUTANEOUS at 04:59

## 2018-07-14 RX ADMIN — OXYCODONE AND ACETAMINOPHEN 2 TABLET(S): 5; 325 TABLET ORAL at 01:15

## 2018-07-14 RX ADMIN — PANTOPRAZOLE SODIUM 40 MILLIGRAM(S): 20 TABLET, DELAYED RELEASE ORAL at 08:44

## 2018-07-14 NOTE — PROGRESS NOTE ADULT - PROBLEM SELECTOR PROBLEM 2
Diabetes mellitus type 2, insulin dependent

## 2018-07-14 NOTE — PROGRESS NOTE ADULT - PROBLEM SELECTOR PLAN 5
cont  anemia meds  monitor daily CBC  currently HD stable
Downtrending on ABG, maintain adequate UO   Continue to monitor
cont  anemia meds  monitor daily CBC  currently HD stable
cont  anemia meds  monitor daily CBC  currently HD stable
stable

## 2018-07-14 NOTE — PROGRESS NOTE ADULT - PROBLEM SELECTOR PLAN 3
cont Lopressor
Start beta blocker as tolerated by HR and SBP
cont Lopressor
cont Lopressor
cont betablocker
Start beta blocker as tolerated by HR and SBP  low sodium   patient education

## 2018-07-14 NOTE — PROGRESS NOTE ADULT - PROBLEM SELECTOR PROBLEM 5
Acute blood loss anemia
Lactate blood increase
Lactate blood increase

## 2018-07-14 NOTE — PROGRESS NOTE ADULT - ASSESSMENT
48 year old male with PMH of DM, HTN and HLD comes with Chest Pain. He underwent  LHC which showed multivessel disease and he subsequently underwent CABGx4 on 7/9/18.    1. T2DM - A1c 7.4% on admission, glucoses controlled except for  today.  on Lantus insulin and  started MFN today and prandial insulin stopped  - if glucoses remain elevated, restart standing prandial Humalog insulin   - cont lantus and metformin  2. HTN - cont BP meds  3. HLD -cont statin  4. CAD s/p CABG - cont aspirin, plavix, metoprolol
> ACS, Multivessel CAD - Continue ASA, Statin, BBlocker.  Awaiting CT surgery f/u regarding CABG vs multivessel PTCA.   > Diabetes - monitor fingersticks.  Insulin coverage for hyperglycemia. Lantus.  FS reasonably controlled.   > Essential HTN - Monitor BP.  Continue oral antihypertensives.    > Hyperlipidemia - diet modification.  Continue Statin.  - Lipid Profile (T. Chol 157; HDL 32; LDL 83 and ) on 7/3/18  > DVT Prophylaxis - Lovenox subcut
Brief History:  Patient is a 48 year old male with significant past medical history of DMII A1C 7.8%, HTN, HLD, initially presented to his PCP on 7/5/18 with complaints of unstable angina associated with SOB and dizziness at which time ECG showed "new changes" and he was sent to [facility] ER. Work up revealed acute ECG changes in setting of ongoing exertional chest pain leading to cath, which ultimately showed diffuse obstructive CAD(proximal LAD 90% , distal LAD 70%, OM1 80%, OM2 80%, Distal RCA 90%, diffuse RPDA and RPL disease, normal LV function on LV gram). On 7/6/18, CT surgery was consulted for surgical evaluation.     Inpatient History:  7/9/18- Four vessel CABG with Dr. Muhammad (SVG-OM, SVG-Diag, SVG- RPDA, LIMA-LAD), extubated without issue, HD stable, lactate down trending          Plan to be discussed / reviewed with attending surgeons during AM rounds.
48 year old male with PMH as above.  Presented to the ER yesterday with CP and dizziness on exertion x 1 month.  Cardiac cath today revealed diffuse, severe obstructive CAD.  CT surgery consulted for surgical evaluation.
48 year old male with PMH of DM, HTN and HLD comes with Chest Pain. He underwent  LHC which showed multivessel disease and he subsequently underwent CABGx4 on 7/9/18.    1. T2DM - A1c 7.4% on admission, transitioned to subcutaneous basal/bolus insulin and glucoses well controlled.  If labs and cardiac function okay can probable resume Metformin and Lantus as outpatient.  2. HTN - cont BP meds  3. HLD -cont statin  4. CAD s/p CABG - cont aspirin, plavix, metoprolol
48M hx DM, HTN, HLD presented with CP had  LHC which showed 3vd had  Four vessel CABG  7/9     CAD: s/p CABG x 4  Cont BB  Statin  ASA, Plavix
49 yo M sent in be PCP with exertional chest pain and new ECG changes symptoms c/w new unstable angina     Unstable angina pectoris  NPO for Cath today in light of acute ECG changes and ongoing exertional chest pain   LHC today    Essential hypertension  Monitor BP
> ACS, Multivessel CAD - Continue ASA, Statin, BBlocker.  Discussed with CT surgery.  Pt agreeable to CABG.  He will be transferred to their service.   > Diabetes - FS have been well controlled / low normal.  Will D/c Lantus as pt expected to be NPO for procedure.  Can utilize premeal insulin if surgery is delayed.    > Essential HTN - Monitor BP.  Continue oral antihypertensives.    > Hyperlipidemia - diet modification.  Continue Statin.  - Lipid Profile (T. Chol 157; HDL 32; LDL 83 and ) on 7/3/18  > DVT Prophylaxis - Lovenox subcut
> Chest Pain at high risk of ACS - Cardiology input appreciated.  Continue ASA, Statin, BBlocker.  Awaiting cath.  > Diabetes - monitor fingersticks.  Insulin coverage for hyperglycemia. Lantus  > Essential HTN - Monitor BP.  Continue oral antihypertensives.  Losartan 50 mg daily.  Metoprolol 25 mg twice daily  > Hyperlipidemia - diet modification.  Continue Statin.  - Lipid Profile (T. Chol 157; HDL 32; LDL 83 and ) on 7/3/18  > DVT Prophylaxis - Lovenox subcut    D/w pt and wife at bedside, in agreement.
A- s/p LHC RRA  Severe and diffuse obstructive CAD
Assessment and Plan:   · Assessment		  47 y/o with DM and HTN , unstable angina, S/P LHC showing multivessel disease (proximal LAD 90% , distal LAD 70%, OM1 80%, OM2 80%, Distal RCA 90%, diffuse RPDA and RPL disease, normal LV function on LV gram. Patient given copy of cath report.    1) CAD: Plan  CABG with 4 Vessel bypass  2) Dm: c/w current Rx  3) HTN: c/w current Rx
Brief History:  Patient is a 48 year old male with significant past medical history of DMII A1C 7.8%, HTN, HLD, initially presented to his PCP on 7/5/18 with complaints of unstable angina associated with SOB and dizziness at which time ECG showed "new changes" and he was sent to [facility] ER. Work up revealed acute ECG changes in setting of ongoing exertional chest pain leading to cath, which ultimately showed diffuse obstructive CAD(proximal LAD 90% , distal LAD 70%, OM1 80%, OM2 80%, Distal RCA 90%, diffuse RPDA and RPL disease, normal LV function on LV gram). On 7/6/18, CT surgery was consulted for surgical evaluation.     Inpatient History:  7/9/18- Four vessel CABG with Dr. Muhammad (SVG-OM, SVG-Diag, SVG- RPDA, LIMA-LAD), extubated without issue, HD stable, lactate down trending
Brief History:  Patient is a 48 year old male with significant past medical history of DMII A1C 7.8%, HTN, HLD, initially presented to his PCP on 7/5/18 with complaints of unstable angina associated with SOB and dizziness at which time ECG showed "new changes" and he was sent to [facility] ER. Work up revealed acute ECG changes in setting of ongoing exertional chest pain leading to cath, which ultimately showed diffuse obstructive CAD(proximal LAD 90% , distal LAD 70%, OM1 80%, OM2 80%, Distal RCA 90%, diffuse RPDA and RPL disease, normal LV function on LV gram). On 7/6/18, CT surgery was consulted for surgical evaluation.     Inpatient History:  7/9/18- Four vessel CABG with Dr. Muhammad (SVG-OM, SVG-Diag, SVG- RPDA, LIMA-LAD), extubated without issue, HD stable, lactate down trending          Plan to be discussed / reviewed with attending surgeons during AM rounds.
Brief History:  Patient is a 48 year old male with significant past medical history of DMII A1C 7.8%, HTN, HLD, initially presented to his PCP on 7/5/18 with complaints of unstable angina associated with SOB and dizziness at which time ECG showed "new changes" and he was sent to [facility] ER. Work up revealed acute ECG changes in setting of ongoing exertional chest pain leading to cath, which ultimately showed diffuse obstructive CAD(proximal LAD 90% , distal LAD 70%, OM1 80%, OM2 80%, Distal RCA 90%, diffuse RPDA and RPL disease, normal LV function on LV gram). On 7/6/18, CT surgery was consulted for surgical evaluation.     Inpatient History:  7/9/18- Four vessel CABG with Dr. Muhammad (SVG-OM, SVG-Diag, SVG- RPDA, LIMA-LAD), extubated without issue, HD stable, lactate down trending          Plan to be discussed / reviewed with attending surgeons during AM rounds.
47 y/o with DM and HTN , unstable angina, with multivessel disease (proximal LAD 90% , distal LAD 70%, OM1 80%, OM2 80%, Distal RCA 90%, diffuse RPDA and RPL disease, normal LV function on LV gram.  Plan for CABG with 4 Vessel bypass
Brief History:  Patient is a 48 year old male with significant past medical history of DMII A1C 7.8%, HTN, HLD, initially presented to his PCP on 7/5/18 with complaints of unstable angina associated with SOB and dizziness at which time ECG showed "new changes" and he was sent to [facility] ER. Work up revealed acute ECG changes in setting of ongoing exertional chest pain leading to cath, which ultimately showed diffuse obstructive CAD(proximal LAD 90% , distal LAD 70%, OM1 80%, OM2 80%, Distal RCA 90%, diffuse RPDA and RPL disease, normal LV function on LV gram). On 7/6/18, CT surgery was consulted for surgical evaluation.     Inpatient History:  7/9/18- Four vessel CABG with Dr. Muhammad (SVG-OM, SVG-Diag, SVG- RPDA, LIMA-LAD), came out on levophed for post operative hypotension, extubated without issue, HD stable, lactate down trending  7/10 pain well controlled, started on lopressor which was up titrated to 50 BID, right chest tube d/c, stable on room air, started on lasix bout of hypotension requiring albumin, anemia medications added for post operative anemia, transitioned off insulin gtt overnight   7/11 HD stable on lopressor 50 BID; continuing cardioprotective medications; lipitor, ASA, plavix; lovenox being held for thrombocytopenia at this time consistent with consumptive thrombocytopenia on pump;     Plan to be discussed / reviewed with attending surgeons during AM rounds.

## 2018-07-14 NOTE — PROGRESS NOTE ADULT - PROBLEM SELECTOR PROBLEM 6
Prophylactic measure
Acute blood loss anemia
Prophylactic measure
Prophylactic measure
Acute blood loss anemia

## 2018-07-14 NOTE — PROGRESS NOTE ADULT - PROBLEM SELECTOR PLAN 6
Continue protonix and colace for GI ppx  Continue Lovenox for DVT ppx
Start anemia meds  monitor daily CBC  currently HD stable
Start anemia meds  monitor daily CBC  currently HD stable

## 2018-07-14 NOTE — PROGRESS NOTE ADULT - PROBLEM SELECTOR PLAN 4
cont Lipitor
Start lipitor for chronic graft patency prophylaxis
cont Lipitor
cont statin
Start lipitor for chronic graft patency prophylaxis  dash diet   patient education

## 2018-07-14 NOTE — PROGRESS NOTE ADULT - PROVIDER SPECIALTY LIST ADULT
Anesthesia
CT Surgery
Cardiology
Endocrinology
Hospitalist
Hospitalist
Endocrinology
Hospitalist
CT Surgery

## 2018-07-14 NOTE — PROGRESS NOTE ADULT - SUBJECTIVE AND OBJECTIVE BOX
Subjective:  "Feel ok I suppose, still a shock I had such a big surgery"  Wife at bedside      Tele:  SR  80s           V/S:                    T(F): 98 (18 @ 04:53), Max: 98.9 (18 @ 21:27)  HR: 80 (18 @ 04:53) (80 - 97)  BP: 99/69 (18 @ 04:53) (76/60 - 141/80)  RR: 18 (18 @ 04:53) (16 - 18)  SpO2: 98% (18 @ 04:53) (96% - 100%)      LV EF: 70%    Echo: < from: TTE Echo Limited or F/U (18 @ 16:18) >  1. Limited Follow-Up study to assess for Pericardial Effusion.   2. Left ventricular ejection fraction, by visual estimation, is >75%.   3. Hyperdynamic global left ventricular systolic function.   4. There is moderate left ventricular hypertrophy.   5. Mild tricuspid regurgitation.   6. Trivial pericardial effusion.    < end of copied text >    Labs:      139  |  103  |  20.0  ----------------------------<  124<H>  4.7   |  24.0  |  0.85    Ca    8.8      2018 06:58  Phos  4.1       Mg     2.0         TPro  6.2<L>  /  Alb  3.7  /  TBili  0.4  /  DBili  x   /  AST  19  /  ALT  24  /  AlkPhos  49                                 8.7    8.7   )-----------( 235      ( 2018 06:58 )             26.1             CAPILLARY BLOOD GLUCOSE      POCT Blood Glucose.: 115 mg/dL (2018 08:43)  POCT Blood Glucose.: 150 mg/dL (2018 21:33)  POCT Blood Glucose.: 213 mg/dL (2018 17:25)  POCT Blood Glucose.: 178 mg/dL (2018 12:00)           CXR:< from: Xray Chest 1 View- PORTABLE-Routine (18 @ 09:01) >  No acute consolidation. Subsegmental atelectasis left lung base. Status   post sternotomy. Borderline cardiomegaly. Curvilinear opacities related   to wire in the left quadrant correlate clinically.      < end of copied text >      I&O's Detail    2018 07:01  -  2018 07:00  --------------------------------------------------------  IN:    Oral Fluid: 250 mL  Total IN: 250 mL    OUT:    Voided: 300 mL  Total OUT: 300 mL    Total NET: -50 mL          CHEST TUBE:  [ ] YES [ x] NO  OUTPUT:     per 24 hours    AIR LEAKS:  [ ] YES [ ] NO      ROBEL DRAIN:   [ ] YES [x ] NO  OUTPUT:     per 24 hours    EPICARDIAL WIRES:  [ ] YES [ x] NO      BOWEL MOVEMENT:  [x ] YES [ ] NO      Daily     Daily Weight in k.3 (2018 04:53)  Medications:  ascorbic acid 500 milliGRAM(s) Oral three times a day  aspirin enteric coated 325 milliGRAM(s) Oral daily  ATENolol  Tablet 50 milliGRAM(s) Oral two times a day  atorvastatin 40 milliGRAM(s) Oral at bedtime  benzocaine 15 mG/menthol 3.6 mG Lozenge 1 Lozenge Oral every 3 hours PRN  clopidogrel Tablet 75 milliGRAM(s) Oral daily  dextrose 50% Injectable 12.5 Gram(s) IV Push once  dextrose 50% Injectable 25 Gram(s) IV Push once  dextrose 50% Injectable 25 Gram(s) IV Push once  docusate sodium 100 milliGRAM(s) Oral three times a day  enoxaparin Injectable 40 milliGRAM(s) SubCutaneous daily  ferrous    sulfate 325 milliGRAM(s) Oral three times a day  folic acid 1 milliGRAM(s) Oral daily  insulin glargine Injectable (LANTUS) 20 Unit(s) SubCutaneous at bedtime  insulin lispro (HumaLOG) corrective regimen sliding scale   SubCutaneous Before meals and at bedtime  metFORMIN 1000 milliGRAM(s) Oral two times a day  multivitamin 1 Tablet(s) Oral daily  oxyCODONE    5 mG/acetaminophen 325 mG 1 Tablet(s) Oral every 4 hours PRN  oxyCODONE    5 mG/acetaminophen 325 mG 2 Tablet(s) Oral every 4 hours PRN  pantoprazole    Tablet 40 milliGRAM(s) Oral daily  polyethylene glycol 3350 17 Gram(s) Oral daily  senna 2 Tablet(s) Oral at bedtime  sodium chloride 0.9% lock flush 3 milliLiter(s) IV Push every 8 hours        Physical Exam:    Neuro: alert, no impairment    Pulm: essentially clear    CV: S1 S2  RRR    Abd: softly distended Reports BM last evening    Extremities:  RLE  EVH  site> mepilex removed, incision clean  No edema    Incision(s): mepilex removed> sternum stable, incision clean                 PAST MEDICAL & SURGICAL HISTORY:  HLD (hyperlipidemia)  HTN (hypertension)  IDDM (insulin dependent diabetes mellitus)  No significant past surgical history

## 2018-07-14 NOTE — PROGRESS NOTE ADULT - PROBLEM SELECTOR PROBLEM 1
S/P CABG x 4
ACS (acute coronary syndrome)
Coronary artery disease of native artery of native heart with stable angina pectoris
S/P CABG x 4

## 2018-07-16 ENCOUNTER — RECORD ABSTRACTING (OUTPATIENT)
Age: 48
End: 2018-07-16

## 2018-07-16 DIAGNOSIS — Z95.1 PRESENCE OF AORTOCORONARY BYPASS GRAFT: ICD-10-CM

## 2018-07-16 RX ORDER — ATENOLOL 50 MG/1
50 TABLET ORAL TWICE DAILY
Refills: 0 | Status: ACTIVE | COMMUNITY
Start: 2018-07-16

## 2018-07-16 RX ORDER — PRAVASTATIN SODIUM 20 MG/1
20 TABLET ORAL
Refills: 0 | Status: ACTIVE | COMMUNITY
Start: 2018-07-16

## 2018-07-16 RX ORDER — MULTIVIT-MIN/FOLIC/VIT K/LYCOP 400-300MCG
500 TABLET ORAL
Refills: 0 | Status: ACTIVE | COMMUNITY

## 2018-07-16 RX ORDER — FOLIC ACID 1 MG/1
1 TABLET ORAL
Refills: 0 | Status: ACTIVE | COMMUNITY

## 2018-07-16 RX ORDER — ASPIRIN 325 MG/1
325 TABLET ORAL DAILY
Qty: 30 | Refills: 0 | Status: ACTIVE | COMMUNITY
Start: 2018-07-16

## 2018-07-16 RX ORDER — CLOPIDOGREL 75 MG/1
75 TABLET, FILM COATED ORAL DAILY
Refills: 0 | Status: ACTIVE | COMMUNITY
Start: 2018-07-16

## 2018-07-16 RX ORDER — CHLORHEXIDINE GLUCONATE 4 %
325 (65 FE) LIQUID (ML) TOPICAL
Refills: 0 | Status: ACTIVE | COMMUNITY

## 2018-07-16 RX ORDER — DOCUSATE SODIUM 100 MG/1
CAPSULE ORAL
Refills: 0 | Status: ACTIVE | COMMUNITY

## 2018-07-16 RX ORDER — INSULIN GLARGINE 100 [IU]/ML
100 INJECTION, SOLUTION SUBCUTANEOUS
Refills: 0 | Status: ACTIVE | COMMUNITY

## 2018-07-16 RX ORDER — INSULIN GLARGINE 100 [IU]/ML
100 INJECTION, SOLUTION SUBCUTANEOUS
Qty: 30 | Refills: 0 | Status: ACTIVE | COMMUNITY
Start: 2018-07-16

## 2018-07-26 PROBLEM — E11.9 TYPE 2 DIABETES MELLITUS WITHOUT COMPLICATIONS: Chronic | Status: ACTIVE | Noted: 2018-07-05

## 2018-07-26 PROBLEM — I10 ESSENTIAL (PRIMARY) HYPERTENSION: Chronic | Status: ACTIVE | Noted: 2018-07-06

## 2018-07-26 PROBLEM — E78.5 HYPERLIPIDEMIA, UNSPECIFIED: Chronic | Status: ACTIVE | Noted: 2018-07-06

## 2018-07-27 ENCOUNTER — APPOINTMENT (OUTPATIENT)
Dept: CARDIOTHORACIC SURGERY | Facility: CLINIC | Age: 48
End: 2018-07-27
Payer: MEDICAID

## 2018-07-27 ENCOUNTER — OUTPATIENT (OUTPATIENT)
Dept: OUTPATIENT SERVICES | Facility: HOSPITAL | Age: 48
LOS: 1 days | End: 2018-07-27
Payer: COMMERCIAL

## 2018-07-27 VITALS
HEIGHT: 64 IN | RESPIRATION RATE: 16 BRPM | BODY MASS INDEX: 21.51 KG/M2 | SYSTOLIC BLOOD PRESSURE: 105 MMHG | WEIGHT: 126 LBS | DIASTOLIC BLOOD PRESSURE: 71 MMHG | HEART RATE: 70 BPM | OXYGEN SATURATION: 100 %

## 2018-07-27 DIAGNOSIS — J90 PLEURAL EFFUSION, NOT ELSEWHERE CLASSIFIED: ICD-10-CM

## 2018-07-27 PROCEDURE — 99024 POSTOP FOLLOW-UP VISIT: CPT

## 2018-07-27 PROCEDURE — 71046 X-RAY EXAM CHEST 2 VIEWS: CPT

## 2018-07-27 PROCEDURE — 71046 X-RAY EXAM CHEST 2 VIEWS: CPT | Mod: 26

## 2018-08-06 ENCOUNTER — NON-APPOINTMENT (OUTPATIENT)
Age: 48
End: 2018-08-06

## 2018-08-06 ENCOUNTER — APPOINTMENT (OUTPATIENT)
Dept: CARDIOLOGY | Facility: CLINIC | Age: 48
End: 2018-08-06
Payer: COMMERCIAL

## 2018-08-06 VITALS
SYSTOLIC BLOOD PRESSURE: 128 MMHG | BODY MASS INDEX: 21.17 KG/M2 | WEIGHT: 124 LBS | OXYGEN SATURATION: 100 % | HEIGHT: 64 IN | HEART RATE: 67 BPM | DIASTOLIC BLOOD PRESSURE: 87 MMHG

## 2018-08-06 DIAGNOSIS — E78.5 HYPERLIPIDEMIA, UNSPECIFIED: ICD-10-CM

## 2018-08-06 DIAGNOSIS — I25.10 ATHEROSCLEROTIC HEART DISEASE OF NATIVE CORONARY ARTERY W/OUT ANGINA PECTORIS: ICD-10-CM

## 2018-08-06 PROCEDURE — 93000 ELECTROCARDIOGRAM COMPLETE: CPT

## 2018-08-06 PROCEDURE — 99214 OFFICE O/P EST MOD 30 MIN: CPT

## 2018-08-14 ENCOUNTER — APPOINTMENT (OUTPATIENT)
Dept: CARDIOLOGY | Facility: CLINIC | Age: 48
End: 2018-08-14

## 2018-08-31 ENCOUNTER — APPOINTMENT (OUTPATIENT)
Dept: CARDIOTHORACIC SURGERY | Facility: CLINIC | Age: 48
End: 2018-08-31

## 2018-08-31 ENCOUNTER — RECORD ABSTRACTING (OUTPATIENT)
Age: 48
End: 2018-08-31

## 2018-09-13 ENCOUNTER — APPOINTMENT (OUTPATIENT)
Dept: CARDIOLOGY | Facility: CLINIC | Age: 48
End: 2018-09-13
Payer: COMMERCIAL

## 2018-09-13 PROCEDURE — 93325 DOPPLER ECHO COLOR FLOW MAPG: CPT

## 2018-09-13 PROCEDURE — 93308 TTE F-UP OR LMTD: CPT

## 2018-09-13 PROCEDURE — 93321 DOPPLER ECHO F-UP/LMTD STD: CPT

## 2018-09-27 ENCOUNTER — APPOINTMENT (OUTPATIENT)
Dept: CARDIOTHORACIC SURGERY | Facility: CLINIC | Age: 48
End: 2018-09-27
Payer: COMMERCIAL

## 2018-09-27 VITALS
HEIGHT: 64 IN | RESPIRATION RATE: 18 BRPM | WEIGHT: 124 LBS | OXYGEN SATURATION: 98 % | BODY MASS INDEX: 21.17 KG/M2 | DIASTOLIC BLOOD PRESSURE: 89 MMHG | TEMPERATURE: 97.7 F | HEART RATE: 69 BPM | SYSTOLIC BLOOD PRESSURE: 140 MMHG

## 2018-09-27 PROCEDURE — 99024 POSTOP FOLLOW-UP VISIT: CPT

## 2018-10-05 ENCOUNTER — APPOINTMENT (OUTPATIENT)
Dept: CARDIOTHORACIC SURGERY | Facility: CLINIC | Age: 48
End: 2018-10-05
Payer: COMMERCIAL

## 2018-10-05 PROCEDURE — 99024 POSTOP FOLLOW-UP VISIT: CPT

## 2019-03-02 ENCOUNTER — EMERGENCY (EMERGENCY)
Facility: HOSPITAL | Age: 49
LOS: 1 days | Discharge: HOME | End: 2019-03-02
Attending: EMERGENCY MEDICINE
Payer: COMMERCIAL

## 2019-03-02 VITALS
RESPIRATION RATE: 16 BRPM | DIASTOLIC BLOOD PRESSURE: 108 MMHG | OXYGEN SATURATION: 100 % | HEART RATE: 71 BPM | SYSTOLIC BLOOD PRESSURE: 165 MMHG | TEMPERATURE: 99 F

## 2019-03-02 VITALS
TEMPERATURE: 98 F | OXYGEN SATURATION: 97 % | DIASTOLIC BLOOD PRESSURE: 63 MMHG | RESPIRATION RATE: 18 BRPM | HEART RATE: 74 BPM | SYSTOLIC BLOOD PRESSURE: 107 MMHG

## 2019-03-02 DIAGNOSIS — R07.9 CHEST PAIN, UNSPECIFIED: ICD-10-CM

## 2019-03-02 DIAGNOSIS — E11.9 TYPE 2 DIABETES MELLITUS WITHOUT COMPLICATIONS: ICD-10-CM

## 2019-03-02 DIAGNOSIS — I10 ESSENTIAL (PRIMARY) HYPERTENSION: ICD-10-CM

## 2019-03-02 DIAGNOSIS — E78.5 HYPERLIPIDEMIA, UNSPECIFIED: ICD-10-CM

## 2019-03-02 DIAGNOSIS — I25.10 ATHEROSCLEROTIC HEART DISEASE OF NATIVE CORONARY ARTERY WITHOUT ANGINA PECTORIS: ICD-10-CM

## 2019-03-02 LAB
ALBUMIN SERPL ELPH-MCNC: 4.3 G/DL — SIGNIFICANT CHANGE UP (ref 3.3–5.2)
ALP SERPL-CCNC: 50 U/L — SIGNIFICANT CHANGE UP (ref 40–120)
ALT FLD-CCNC: 21 U/L — SIGNIFICANT CHANGE UP
ANION GAP SERPL CALC-SCNC: 12 MMOL/L — SIGNIFICANT CHANGE UP (ref 5–17)
APPEARANCE UR: CLEAR — SIGNIFICANT CHANGE UP
APTT BLD: 32.8 SEC — SIGNIFICANT CHANGE UP (ref 27.5–36.3)
AST SERPL-CCNC: 21 U/L — SIGNIFICANT CHANGE UP
BILIRUB SERPL-MCNC: 0.5 MG/DL — SIGNIFICANT CHANGE UP (ref 0.4–2)
BILIRUB UR-MCNC: NEGATIVE — SIGNIFICANT CHANGE UP
BUN SERPL-MCNC: 11 MG/DL — SIGNIFICANT CHANGE UP (ref 8–20)
CALCIUM SERPL-MCNC: 9.4 MG/DL — SIGNIFICANT CHANGE UP (ref 8.6–10.2)
CHLORIDE SERPL-SCNC: 101 MMOL/L — SIGNIFICANT CHANGE UP (ref 98–107)
CO2 SERPL-SCNC: 25 MMOL/L — SIGNIFICANT CHANGE UP (ref 22–29)
COLOR SPEC: YELLOW — SIGNIFICANT CHANGE UP
CREAT SERPL-MCNC: 0.84 MG/DL — SIGNIFICANT CHANGE UP (ref 0.5–1.3)
DIFF PNL FLD: ABNORMAL
EPI CELLS # UR: SIGNIFICANT CHANGE UP
GLUCOSE SERPL-MCNC: 158 MG/DL — HIGH (ref 70–115)
GLUCOSE UR QL: NEGATIVE MG/DL — SIGNIFICANT CHANGE UP
HCT VFR BLD CALC: 43 % — SIGNIFICANT CHANGE UP (ref 42–52)
HGB BLD-MCNC: 14.8 G/DL — SIGNIFICANT CHANGE UP (ref 14–18)
INR BLD: 1.03 RATIO — SIGNIFICANT CHANGE UP (ref 0.88–1.16)
KETONES UR-MCNC: NEGATIVE — SIGNIFICANT CHANGE UP
LEUKOCYTE ESTERASE UR-ACNC: NEGATIVE — SIGNIFICANT CHANGE UP
LIDOCAIN IGE QN: 40 U/L — SIGNIFICANT CHANGE UP (ref 22–51)
MAGNESIUM SERPL-MCNC: 1.9 MG/DL — SIGNIFICANT CHANGE UP (ref 1.6–2.6)
MCHC RBC-ENTMCNC: 29.5 PG — SIGNIFICANT CHANGE UP (ref 27–31)
MCHC RBC-ENTMCNC: 34.4 G/DL — SIGNIFICANT CHANGE UP (ref 32–36)
MCV RBC AUTO: 85.7 FL — SIGNIFICANT CHANGE UP (ref 80–94)
NITRITE UR-MCNC: NEGATIVE — SIGNIFICANT CHANGE UP
NT-PROBNP SERPL-SCNC: 261 PG/ML — SIGNIFICANT CHANGE UP (ref 0–300)
PH UR: 6.5 — SIGNIFICANT CHANGE UP (ref 5–8)
PLATELET # BLD AUTO: 152 K/UL — SIGNIFICANT CHANGE UP (ref 150–400)
POTASSIUM SERPL-MCNC: 4.5 MMOL/L — SIGNIFICANT CHANGE UP (ref 3.5–5.3)
POTASSIUM SERPL-SCNC: 4.5 MMOL/L — SIGNIFICANT CHANGE UP (ref 3.5–5.3)
PROT SERPL-MCNC: 8 G/DL — SIGNIFICANT CHANGE UP (ref 6.6–8.7)
PROT UR-MCNC: NEGATIVE MG/DL — SIGNIFICANT CHANGE UP
PROTHROM AB SERPL-ACNC: 11.8 SEC — SIGNIFICANT CHANGE UP (ref 10–12.9)
RBC # BLD: 5.02 M/UL — SIGNIFICANT CHANGE UP (ref 4.6–6.2)
RBC # FLD: 13 % — SIGNIFICANT CHANGE UP (ref 11–15.6)
RBC CASTS # UR COMP ASSIST: ABNORMAL /HPF (ref 0–4)
SODIUM SERPL-SCNC: 138 MMOL/L — SIGNIFICANT CHANGE UP (ref 135–145)
SP GR SPEC: 1.01 — SIGNIFICANT CHANGE UP (ref 1.01–1.02)
TROPONIN T SERPL-MCNC: <0.01 NG/ML — SIGNIFICANT CHANGE UP (ref 0–0.06)
TROPONIN T SERPL-MCNC: <0.01 NG/ML — SIGNIFICANT CHANGE UP (ref 0–0.06)
UROBILINOGEN FLD QL: NEGATIVE MG/DL — SIGNIFICANT CHANGE UP
WBC # BLD: 7.8 K/UL — SIGNIFICANT CHANGE UP (ref 4.8–10.8)
WBC # FLD AUTO: 7.8 K/UL — SIGNIFICANT CHANGE UP (ref 4.8–10.8)
WBC UR QL: SIGNIFICANT CHANGE UP

## 2019-03-02 PROCEDURE — 96361 HYDRATE IV INFUSION ADD-ON: CPT

## 2019-03-02 PROCEDURE — 74174 CTA ABD&PLVS W/CONTRAST: CPT | Mod: 26

## 2019-03-02 PROCEDURE — 85027 COMPLETE CBC AUTOMATED: CPT

## 2019-03-02 PROCEDURE — 83880 ASSAY OF NATRIURETIC PEPTIDE: CPT

## 2019-03-02 PROCEDURE — 83735 ASSAY OF MAGNESIUM: CPT

## 2019-03-02 PROCEDURE — 81001 URINALYSIS AUTO W/SCOPE: CPT

## 2019-03-02 PROCEDURE — 36415 COLL VENOUS BLD VENIPUNCTURE: CPT

## 2019-03-02 PROCEDURE — 85610 PROTHROMBIN TIME: CPT

## 2019-03-02 PROCEDURE — 96375 TX/PRO/DX INJ NEW DRUG ADDON: CPT | Mod: XU

## 2019-03-02 PROCEDURE — 93010 ELECTROCARDIOGRAM REPORT: CPT

## 2019-03-02 PROCEDURE — 80053 COMPREHEN METABOLIC PANEL: CPT

## 2019-03-02 PROCEDURE — 71275 CT ANGIOGRAPHY CHEST: CPT

## 2019-03-02 PROCEDURE — 74174 CTA ABD&PLVS W/CONTRAST: CPT

## 2019-03-02 PROCEDURE — 99284 EMERGENCY DEPT VISIT MOD MDM: CPT

## 2019-03-02 PROCEDURE — 85730 THROMBOPLASTIN TIME PARTIAL: CPT

## 2019-03-02 PROCEDURE — 71045 X-RAY EXAM CHEST 1 VIEW: CPT

## 2019-03-02 PROCEDURE — 83690 ASSAY OF LIPASE: CPT

## 2019-03-02 PROCEDURE — 71045 X-RAY EXAM CHEST 1 VIEW: CPT | Mod: 26

## 2019-03-02 PROCEDURE — 99284 EMERGENCY DEPT VISIT MOD MDM: CPT | Mod: 25

## 2019-03-02 PROCEDURE — 96374 THER/PROPH/DIAG INJ IV PUSH: CPT | Mod: XU

## 2019-03-02 PROCEDURE — 71275 CT ANGIOGRAPHY CHEST: CPT | Mod: 26

## 2019-03-02 PROCEDURE — 93005 ELECTROCARDIOGRAM TRACING: CPT

## 2019-03-02 PROCEDURE — 99285 EMERGENCY DEPT VISIT HI MDM: CPT

## 2019-03-02 PROCEDURE — 84484 ASSAY OF TROPONIN QUANT: CPT

## 2019-03-02 RX ORDER — KETOROLAC TROMETHAMINE 30 MG/ML
30 SYRINGE (ML) INJECTION ONCE
Qty: 0 | Refills: 0 | Status: DISCONTINUED | OUTPATIENT
Start: 2019-03-02 | End: 2019-03-02

## 2019-03-02 RX ORDER — NITROGLYCERIN 6.5 MG
1 CAPSULE, EXTENDED RELEASE ORAL ONCE
Qty: 0 | Refills: 0 | Status: COMPLETED | OUTPATIENT
Start: 2019-03-02 | End: 2019-03-02

## 2019-03-02 RX ORDER — SODIUM CHLORIDE 9 MG/ML
1000 INJECTION INTRAMUSCULAR; INTRAVENOUS; SUBCUTANEOUS ONCE
Qty: 0 | Refills: 0 | Status: COMPLETED | OUTPATIENT
Start: 2019-03-02 | End: 2019-03-02

## 2019-03-02 RX ORDER — HYDRALAZINE HCL 50 MG
5 TABLET ORAL ONCE
Qty: 0 | Refills: 0 | Status: COMPLETED | OUTPATIENT
Start: 2019-03-02 | End: 2019-03-02

## 2019-03-02 RX ADMIN — Medication 30 MILLIGRAM(S): at 15:31

## 2019-03-02 RX ADMIN — Medication 30 MILLIGRAM(S): at 15:46

## 2019-03-02 RX ADMIN — SODIUM CHLORIDE 1000 MILLILITER(S): 9 INJECTION INTRAMUSCULAR; INTRAVENOUS; SUBCUTANEOUS at 14:11

## 2019-03-02 RX ADMIN — Medication 1 INCH(S): at 13:12

## 2019-03-02 RX ADMIN — SODIUM CHLORIDE 1000 MILLILITER(S): 9 INJECTION INTRAMUSCULAR; INTRAVENOUS; SUBCUTANEOUS at 13:11

## 2019-03-02 RX ADMIN — Medication 5 MILLIGRAM(S): at 13:12

## 2019-03-02 NOTE — ED ADULT NURSE NOTE - OBJECTIVE STATEMENT
assumed pt care at 1250. Pt A&O x 4. States he developed left side chest pain with palpitations last night and has not gotten better. Had cardiac surgery 6 months ago. Denies SOB, dizziness, N/V/D, numbness/tingling, headaches. No s/s of resp distress noted. Skin color WNL. Safety maintained. Will continue to monitor.

## 2019-03-02 NOTE — CONSULT NOTE ADULT - ASSESSMENT
Assessment: Chest pain, R/O pericarditis    Plan:  Toradol 30 mg IVP once, if relief with Toradol and testing negative, may discharge home on Naproxen.  Follow up official CT results.  Troponin and EKG at 4:30PM.

## 2019-03-02 NOTE — ED PROVIDER NOTE - PROGRESS NOTE DETAILS
Dr Fink called and will see for cardiology consult Dr Fink saw pt and rec if second trop negative d/c on naprosyn 250 bid and f/u with him pt feels better and trop x 2 negative will d/c on Naprosyn as per cardiology recommendation

## 2019-03-02 NOTE — CONSULT NOTE ADULT - ATTENDING COMMENTS
symptoms consistent with pleuritic/pericarditis.   if relief with toradol and enzymes negative, plan for naproxen 250 mg po bid for 7 days.

## 2019-03-02 NOTE — ED STATDOCS - OBJECTIVE STATEMENT
49 y/o M pt with PMHx of CAD, with CABG (July 9th, 2018) presents to ED c/o left sided cp and left sided back pain that began yesterday.  Notes subjective fever last night. Denies sweating.  Cardio: Dr. Acosta  CABG: Dr. Cortes  EKG shows lateral lead t wave inversion.  Pt appears ill  Pt will go to Main ED with Monitor for further evaluation

## 2019-03-02 NOTE — ED ADULT NURSE NOTE - CINV DISCH MEDS REVIEWED YN
MRN: [id number]  Name: ELIZABETH GARCIA  Address: [facility] [place] [address]     We missed you! Our records indicate that we have attempted to reach you numerous times to no avail.    We are concerned because your health is important to us. Please call our office at your earliest convenience.    Sincerely, ENDY Jordan / Jhoana MUNGUIA     Signatures   Electronically signed by : ZAYNAB JORDAN; Mar 29 2018  9:19AM CST (Author)     Yes

## 2019-03-02 NOTE — ED PROVIDER NOTE - OBJECTIVE STATEMENT
49 y/o male with a h/o htn and DM and  cad s/p cabg and he was well until yesterday he developed left sided chest pain He describes it as Like water bubbling. 47 y/o male with a h/o htn and DM and  cad s/p cabg and he was well until yesterday he developed left sided chest pain He describes it as Like water bubbling and also left upper back pain no sob no other symptoms

## 2019-03-02 NOTE — ED PROVIDER NOTE - CLINICAL SUMMARY MEDICAL DECISION MAKING FREE TEXT BOX
chest and back pain and htn will ecg nondiagnostic will get w/u with ct angio r/o dissection and cardiology eval and reeval

## 2019-03-14 NOTE — PROGRESS NOTE ADULT - SUBJECTIVE AND OBJECTIVE BOX
[facility] planned for unstable angina  Aspirin 325 po taken today  ASA 2  Mallampati 2  K+ repleted  IVF ordered    CARDIOLOGY:  Assessment and Plan:   · Assessment		  49 yo M sent in be PCP with exertional chest pain and new ECG changes symptoms c/w new unstable angina     Unstable angina pectoris  NPO for Cath today in light of acute ECG changes and ongoing exertional chest pain   [facility] today   Dr. Solis to consent 99

## 2019-05-17 NOTE — CONSULT NOTE ADULT - SUBJECTIVE AND OBJECTIVE BOX
[facility]                                                             Office: [address] [address]                                                            Telephone: [phone]. Fax:[phone]                                                                   CARDIOLOGY CONSULTATION NOTE       Patient is a 48y old  Male who presents with a chief complaint of chest pain    HPI: 49y/o male never smoker with history of CAD, CABG (LIMA to the LAD, SVG to the PDA, SVG to the OM, SVG to the diagonal,  at [facility]), DM, HTN, HLD c/o lower left chest discomfort, described as a "bubbling" sensation, 8/10, since yesterday, worsened with deep breath. He admits to significant fatigue and CABALLERO (NYHA functional class 3) for 1 month. He states he felt like he had a fever yesterday, which improved with Tylenol.    PAST MEDICAL & SURGICAL HISTORY:  CAD  CABG: LIMA to the LAD, SVG to the PDA, SVG to the OM, SVG to the diagonal,  at [facility]  HLD  HTN   DM     Allergies: No Known Allergies    Home Medications:  ascorbic acid 500 mg oral tablet: 1 tab(s) orally 3 times a day (2018 08:58)  aspirin 325 mg oral delayed release tablet: 1 tab(s) orally once a day (2018 08:58)  docusate sodium 100 mg oral capsule: 1 cap(s) orally 3 times a day (2018 08:58)  ferrous sulfate 325 mg (65 mg elemental iron) oral tablet: orally 2 times a day (2018 08:58)  folic acid 0.8 mg oral tablet: 1 tab(s) orally once a day (2018 08:58)  polyethylene glycol 3350 oral powder for reconstitution: 17 gram(s) orally once a day (2018 08:58)  Pravachol 20 mg oral tablet: 1 tab(s) orally once a day (2018 08:34)  senna oral tablet: 2 tab(s) orally once a day (at bedtime) (2018 08:58)    FAMILY HISTORY:  Father:  from MI @ age 55  Mother: Alive @ age 72, history of CVA    SOCIAL HISTORY:       Tobacco: Never smoker       ETOH: Denies       Illicit Drugs: Denies       Caffeine: 1 cup tea/day       Marital/Living: , lives with wife    PREVIOUS DIAGNOSTIC TESTING:      ECHO FINDINGS: 2018  Left Ventricle: Global LV systolic function was hyperdynamic. Left ventricular ejection fraction, by visual estimation, is >75%.  Right Ventricle: RV systolic function is normal.  Pericardium: Trivial pericardial effusion is present. The pericardial effusion is localized near the right atrium.  Mitral Valve: The mitral valve is normal in structure. Trace mitral valve regurgitation is seen.  Tricuspid Valve: The tricuspid valve is normal. Mild tricuspid regurgitation is visualized.  Aortic Valve: No evidence of aortic stenosis.  Pulmonic Valve: The pulmonic valve was not well visualized. Trace pulmonic valve regurgitation.    STRESS FINDINGS: N/A    CATHETERIZATION FINDINGS: 2018  VENTRICLES: EF calculated by contrast ventriculography was 55 %.  CORONARY VESSELS: The coronary circulation is right dominant.  LM:     --  Distal left main: There was a 20 % stenosis.  LAD:     --  LAD: The vessel was medium sized and mildly calcified.  --  Proximal LAD: There was a tubular 50 % stenosis in the proximal third of the vessel segment. In a second lesion, there was a discrete 90 % stenosis in the middle third of the vessel segment.  --  Mid LAD: There was a tubular 50 % stenosis in the distal third of the vessel segment.  --  Distal LAD: There was a tubular 80 % stenosis in the middle third of the vessel segment.  --  D1: The vessel was medium sized. There was a tubular 90 % stenosis in the proximal third of the vessel segment.  CX:     --  Proximal circumflex: There was a 70 % stenosis.  --  Mid circumflex: There was a tubular 90 % stenosis.  --  OM1: There was a tubular 80 % stenosis in the proximal third of the vessel segment.  --  OM2: The vessel was medium sized. There was a discrete 60 % stenosis in the middle third of the vessel segment.  RCA:     --  Proximal RCA: There was a 30 % stenosis.  --  Mid RCA: There was a 40 % stenosis.  --  Distal RCA: There was a discrete 90 % stenosis.  --  RPDA: The vessel was medium sized. There was a tubular 80 % stenosis in the proximal third of the vessel segment. In a second lesion, there was a tubular 70 % stenosis in the middle third of the vessel segment. In a third lesion, there was a discrete 100 % stenosis. Distally fills by collaterals from the left coronary system  --  RPLS: The vessel was small to medium sized. Angiography showed moderate atherosclerosis. The artery was supplied by collaterals.    REVIEW OF SYSTEMS:  CONSTITUTIONAL: States he felt like he had a fever yesterday, weight stable, + fatigue  EYES: No eye pain, visual disturbances, or discharge  ENMT:  No difficulty hearing, tinnitus, vertigo; no sinus or throat pain, no epistaxis  NECK: No pain or stiffness  RESPIRATORY: No cough, wheezing, hemoptysis. No shortness of breath  CARDIOVASCULAR: Lower left chest discomfort, + palpitations, + CABALLERO (NYHA functional class 3). No PND or orthopnea. No edema or claudication  GASTROINTESTINAL: No abdominal or epigastric pain. No nausea, vomiting, or hematemesis; No diarrhea or constipation. No melena or hematochezia.  GENITOURINARY: No dysuria, frequency, hematuria, or incontinence  NEUROLOGICAL: No headaches, memory loss, weakness, dizziness, syncope/near syncope or tremors. He states loss of sensation anterior right lower leg since surgery.  SKIN: No itching, burning, rashes, or lesions   LYMPH Nodes: No enlarged glands  ENDOCRINE: No heat or cold intolerance; No hair loss  MUSCULOSKELETAL: + pain in fingers and toes; No muscle, back, or extremity pain  PSYCHIATRIC: No depression, anxiety, mood swings, or difficulty sleeping  HEME/LYMPH: No easy bruising, or bleeding gums  ALLERGY AND IMMUNOLOGIC: No hives or eczema	    Vital Signs Last 24 Hrs  T(C): 37.1 (02 Mar 2019 12:13), Max: 37.1 (02 Mar 2019 12:13)  T(F): 98.7 (02 Mar 2019 12:13), Max: 98.7 (02 Mar 2019 12:13)  HR: 68 (02 Mar 2019 12:59) (68 - 71)  BP: 149/86 (02 Mar 2019 12:59) (149/86 - 165/108)  RR: 19 (02 Mar 2019 12:59) (16 - 19)  SpO2: 97% (02 Mar 2019 12:59) (97% - 100%)    PHYSICAL EXAM:  Appearance: Normal, well nourished, NAD	  HEENT:   Normal oral mucosa, PERRL, EOMI, sclera non-icteric	  Neck: No bruit or JVD  Lymphatic: No cervical lymphadenopathy  Cardiovascular: Normal S1 S2, No cardiac murmurs, No carotid bruits, No peripheral edema  Respiratory: Lungs clear to auscultation	  Psychiatry: A & O x 3, Mood & affect appropriate  Gastrointestinal:  Soft, Non-tender, + BS, no bruits.  No hepatojugular reflux.	  Skin: No rashes, No ecchymoses, No cyanosis. Midsternal incision, well healed  Neurologic: Grossly non-focal with full strength in all four extremities, CN 2-12 grossly intact.  Extremities: Normal range of motion, No clubbing, cyanosis or edema  Vascular: Peripheral pulses palpable 2+ bilaterally, warm    INTERPRETATION OF TELEMETRY:    ECG (tracing reviewed by me):     LABS:                        14.8   7.8   )-----------( 152      ( 02 Mar 2019 12:58 )             43.0     03-    138  |  101  |  11.0  ----------------------<  158  4.5   |  25.0  |  0.84    Ca    9.4      02 Mar 2019 12:58  Mg     1.9     -    TPro  8.0  /  Alb  4.3  /  TBili  0.5  /  DBili  x   /  AST  21  /  ALT  21  /  AlkPhos  50  03-    CARDIAC MARKERS ( 02 Mar 2019 12:58 ): <0.01 ng/mL      PT/INR - ( 02 Mar 2019 12:58 )   PT: 11.8 sec;   INR: 1.03 ratio    PTT - ( 02 Mar 2019 12:58 )  PTT:32.8 sec    BNPSerum Pro-Brain Natriuretic Peptide: 261 pg/mL ( @ 12:58)    RADIOLOGY & ADDITIONAL STUDIES:  CXR (image reviewed by me): [facility]                                                             Office: [address] [address]                                                            Telephone: [phone]. Fax:[phone]                                                                   CARDIOLOGY CONSULTATION NOTE       Patient is a 48y old  Male who presents with a chief complaint of chest pain    HPI: 47y/o male never smoker with history of CAD, CABG (LIMA to the LAD, SVG to the PDA, SVG to the OM, SVG to the diagonal,  at [facility]), DM, HTN, HLD c/o lower left chest discomfort, described as a "bubbling" sensation, 8/10, since yesterday, worsened with deep breath. He admits to significant fatigue and CABALLERO (NYHA functional class 3) for 1 month. He states he felt like he had a fever yesterday, which improved with Tylenol.    PAST MEDICAL & SURGICAL HISTORY:  CAD  CABG: LIMA to the LAD, SVG to the PDA, SVG to the OM, SVG to the diagonal,  at [facility]  HLD  HTN   DM     Allergies: No Known Allergies    Home Medications:  ascorbic acid 500 mg oral tablet: 1 tab(s) orally 3 times a day (2018 08:58)  aspirin 325 mg oral delayed release tablet: 1 tab(s) orally once a day (2018 08:58)  docusate sodium 100 mg oral capsule: 1 cap(s) orally 3 times a day (2018 08:58)  ferrous sulfate 325 mg (65 mg elemental iron) oral tablet: orally 2 times a day (2018 08:58)  folic acid 0.8 mg oral tablet: 1 tab(s) orally once a day (2018 08:58)  polyethylene glycol 3350 oral powder for reconstitution: 17 gram(s) orally once a day (2018 08:58)  Pravachol 20 mg oral tablet: 1 tab(s) orally once a day (2018 08:34)  senna oral tablet: 2 tab(s) orally once a day (at bedtime) (2018 08:58)    FAMILY HISTORY:  Father:  from MI @ age 55  Mother: Alive @ age 72, history of CVA    SOCIAL HISTORY:       Tobacco: Never smoker       ETOH: Denies       Illicit Drugs: Denies       Caffeine: 1 cup tea/day       Marital/Living: , lives with wife    PREVIOUS DIAGNOSTIC TESTING:      ECHO FINDINGS: 2018  Left Ventricle: Global LV systolic function was hyperdynamic. Left ventricular ejection fraction, by visual estimation, is >75%.  Right Ventricle: RV systolic function is normal.  Pericardium: Trivial pericardial effusion is present. The pericardial effusion is localized near the right atrium.  Mitral Valve: The mitral valve is normal in structure. Trace mitral valve regurgitation is seen.  Tricuspid Valve: The tricuspid valve is normal. Mild tricuspid regurgitation is visualized.  Aortic Valve: No evidence of aortic stenosis.  Pulmonic Valve: The pulmonic valve was not well visualized. Trace pulmonic valve regurgitation.    STRESS FINDINGS: N/A    CATHETERIZATION FINDINGS: 2018  VENTRICLES: EF calculated by contrast ventriculography was 55 %.  CORONARY VESSELS: The coronary circulation is right dominant.  LM:     --  Distal left main: There was a 20 % stenosis.  LAD:     --  LAD: The vessel was medium sized and mildly calcified.  --  Proximal LAD: There was a tubular 50 % stenosis in the proximal third of the vessel segment. In a second lesion, there was a discrete 90 % stenosis in the middle third of the vessel segment.  --  Mid LAD: There was a tubular 50 % stenosis in the distal third of the vessel segment.  --  Distal LAD: There was a tubular 80 % stenosis in the middle third of the vessel segment.  --  D1: The vessel was medium sized. There was a tubular 90 % stenosis in the proximal third of the vessel segment.  CX:     --  Proximal circumflex: There was a 70 % stenosis.  --  Mid circumflex: There was a tubular 90 % stenosis.  --  OM1: There was a tubular 80 % stenosis in the proximal third of the vessel segment.  --  OM2: The vessel was medium sized. There was a discrete 60 % stenosis in the middle third of the vessel segment.  RCA:     --  Proximal RCA: There was a 30 % stenosis.  --  Mid RCA: There was a 40 % stenosis.  --  Distal RCA: There was a discrete 90 % stenosis.  --  RPDA: The vessel was medium sized. There was a tubular 80 % stenosis in the proximal third of the vessel segment. In a second lesion, there was a tubular 70 % stenosis in the middle third of the vessel segment. In a third lesion, there was a discrete 100 % stenosis. Distally fills by collaterals from the left coronary system  --  RPLS: The vessel was small to medium sized. Angiography showed moderate atherosclerosis. The artery was supplied by collaterals.    REVIEW OF SYSTEMS:  CONSTITUTIONAL: States he felt like he had a fever yesterday, weight stable, + fatigue  EYES: No eye pain, visual disturbances, or discharge  ENMT:  No difficulty hearing, tinnitus, vertigo; no sinus or throat pain, no epistaxis  NECK: No pain or stiffness  RESPIRATORY: No cough, wheezing, hemoptysis. No shortness of breath  CARDIOVASCULAR: Lower left chest discomfort, + palpitations, + CABALLERO (NYHA functional class 3). No PND or orthopnea. No edema or claudication  GASTROINTESTINAL: No abdominal or epigastric pain. No nausea, vomiting, or hematemesis; No diarrhea or constipation. No melena or hematochezia.  GENITOURINARY: No dysuria, frequency, hematuria, or incontinence  NEUROLOGICAL: No headaches, memory loss, weakness, dizziness, syncope/near syncope or tremors. He states loss of sensation anterior right lower leg since surgery.  SKIN: No itching, burning, rashes, or lesions   LYMPH Nodes: No enlarged glands  ENDOCRINE: No heat or cold intolerance; No hair loss  MUSCULOSKELETAL: + pain in fingers and toes; No muscle, back, or extremity pain  PSYCHIATRIC: No depression, anxiety, mood swings, or difficulty sleeping  HEME/LYMPH: No easy bruising, or bleeding gums  ALLERGY AND IMMUNOLOGIC: No hives or eczema	    Vital Signs Last 24 Hrs  T(C): 37.1 (02 Mar 2019 12:13), Max: 37.1 (02 Mar 2019 12:13)  T(F): 98.7 (02 Mar 2019 12:13), Max: 98.7 (02 Mar 2019 12:13)  HR: 68 (02 Mar 2019 12:59) (68 - 71)  BP: 149/86 (02 Mar 2019 12:59) (149/86 - 165/108)  RR: 19 (02 Mar 2019 12:59) (16 - 19)  SpO2: 97% (02 Mar 2019 12:59) (97% - 100%)    PHYSICAL EXAM:  Appearance: Normal, well nourished, NAD	  HEENT:   Normal oral mucosa, PERRL, EOMI, sclera non-icteric	  Neck: No bruit or JVD  Lymphatic: No cervical lymphadenopathy  Cardiovascular: Normal S1 S2, No cardiac murmurs, No carotid bruits, No peripheral edema  Respiratory: Lungs clear to auscultation	  Psychiatry: A & O x 3, Mood & affect appropriate  Gastrointestinal:  Soft, Non-tender, + BS, no bruits.  No hepatojugular reflux.	  Skin: No rashes, No ecchymoses, No cyanosis. Midsternal incision, well healed  Neurologic: Grossly non-focal with full strength in all four extremities, CN 2-12 grossly intact.  Extremities: Normal range of motion, No clubbing, cyanosis or edema  Vascular: Peripheral pulses palpable 2+ bilaterally, warm    INTERPRETATION OF TELEMETRY: SR    ECG (tracing reviewed by me): NSR, LAE, LAD, anterolateral ST/T wave abnormality    LABS:                        14.8   7.8   )-----------( 152      ( 02 Mar 2019 12:58 )             43.0     03-    138  |  101  |  11.0  ----------------------<  158  4.5   |  25.0  |  0.84    Ca    9.4      02 Mar 2019 12:58  Mg     1.9     -    TPro  8.0  /  Alb  4.3  /  TBili  0.5  /  DBili  x   /  AST  21  /  ALT  21  /  AlkPhos  50  03-    CARDIAC MARKERS ( 02 Mar 2019 12:58 ): <0.01 ng/mL      PT/INR - ( 02 Mar 2019 12:58 )   PT: 11.8 sec;   INR: 1.03 ratio    PTT - ( 02 Mar 2019 12:58 )  PTT:32.8 sec    BNPSerum Pro-Brain Natriuretic Peptide: 261 pg/mL ( @ 12:58)    RADIOLOGY & ADDITIONAL STUDIES:  CXR (image reviewed by me): [facility] nontender/soft

## 2019-05-31 ENCOUNTER — APPOINTMENT (OUTPATIENT)
Dept: CARDIOTHORACIC SURGERY | Facility: CLINIC | Age: 49
End: 2019-05-31
Payer: COMMERCIAL

## 2019-05-31 VITALS
TEMPERATURE: 98 F | OXYGEN SATURATION: 100 % | BODY MASS INDEX: 20.83 KG/M2 | HEIGHT: 64 IN | HEART RATE: 60 BPM | WEIGHT: 122 LBS | DIASTOLIC BLOOD PRESSURE: 89 MMHG | RESPIRATION RATE: 16 BRPM | SYSTOLIC BLOOD PRESSURE: 141 MMHG

## 2019-05-31 PROCEDURE — 99214 OFFICE O/P EST MOD 30 MIN: CPT

## 2019-05-31 RX ORDER — PANTOPRAZOLE 40 MG/1
40 TABLET, DELAYED RELEASE ORAL DAILY
Qty: 14 | Refills: 0 | Status: COMPLETED | COMMUNITY
Start: 2018-07-16 | End: 2019-05-31

## 2019-05-31 RX ORDER — SENNOSIDES 8.6 MG/1
CAPSULE, GELATIN COATED ORAL
Refills: 0 | Status: COMPLETED | COMMUNITY
End: 2019-05-31

## 2019-05-31 RX ORDER — OXYCODONE HYDROCHLORIDE AND ACETAMINOPHEN 5; 325 MG/1; MG/1
5-325 TABLET ORAL
Refills: 0 | Status: COMPLETED | COMMUNITY
End: 2019-05-31

## 2019-05-31 RX ORDER — POLYETHYLENE GLYCOL 3350 17 G/17G
17 POWDER, FOR SOLUTION ORAL
Refills: 0 | Status: COMPLETED | COMMUNITY
End: 2019-05-31

## 2019-05-31 NOTE — CONSULT LETTER
[Dear  ___] : Dear  [unfilled], [Consult Closing:] : Thank you very much for allowing me to participate in the care of this patient.  If you have any questions, please do not hesitate to contact me. [Courtesy Letter:] : I had the pleasure of seeing your patient, [unfilled], in my office today. [Sincerely,] : Sincerely, [FreeTextEntry2] : Cameron Muñiz MD [FreeTextEntry3] : Dusty Muhammad MD\par Cardiothoracic Surgery\par [facility]\par [place], [place]\par

## 2019-05-31 NOTE — ASSESSMENT
[FreeTextEntry1] : Mr. Napoles is s/p CABG 7/9/2108. He complains of pain near sternal incision. He has cxr and CT chest in feb 2019,which was unremarkable. His sternal wound is well healed. \par \par There seems to be muscular pain involving anterior chest wall. I have recommended him to consult a pain management specialist. \par \par This was discussed with him in detail, and all questions were answered.

## 2019-05-31 NOTE — HISTORY OF PRESENT ILLNESS
[FreeTextEntry1] : Mr. VELASCO is a 48 year old male who presents for sternal evaluation. His past medical history includes CAD, HLD, DM, CABG x 4 (2018) and HTN.\par \par \par

## 2020-02-26 NOTE — H&P ADULT - HISTORY OF PRESENT ILLNESS
48 years old male with PMH of DM, HTN and HLD comes with Chest Pain. As per patient, he has intermittent retrosternal chest pain for a month. It is mostly on exertion - dull and achy, non radiating, relieves with rest and is associated with shortness of breath and dizziness. Today, he went to his PMD for routine follow up and was found to have new EKG changes so he was sent to ER for further evaluation.  Denies fever, cough, palpitations, nausea, vomiting, diaphoresis, sick contacts or recent travel. 27-Feb-2020 09:20

## 2021-10-25 NOTE — ED PROVIDER NOTE - DISCHARGE REVIEW MATERIAL PRESENTED
Render In Strict Bullet Format?: No Plan: Follow up in 6 weeks Initiate Treatment: :\\n- prednisone 10 mg: take 3 tabs (30 mg total) every morning x 1 week , then take 2 tabs (20 mg total) every morning x 1 week, then take 1 tab (10 mg total) x 1 week.\\n- tacrolimus ointment: apply a thin layer to affected areas of the hands and feet twice a day on the off week from mometasone.\\n- mometasone ointment: apply a thin layer to affected areas of the hands and feet up to twice a day as needed for flares. Use for 2 weeks on and 1 week off.\\n- hydroxyzine 10 mg 1-2 at bedtime as needed for itching. CAUTION with sedation. Detail Level: Zone Continue Regimen: :\\n- gentle skin care with CeraVe cleanser and CeraVe moisturizer .

## 2021-11-17 ENCOUNTER — OUTPATIENT (OUTPATIENT)
Dept: OUTPATIENT SERVICES | Facility: HOSPITAL | Age: 51
LOS: 1 days | End: 2021-11-17
Payer: COMMERCIAL

## 2021-11-17 DIAGNOSIS — I10 ESSENTIAL (PRIMARY) HYPERTENSION: ICD-10-CM

## 2021-11-17 DIAGNOSIS — R07.89 OTHER CHEST PAIN: ICD-10-CM

## 2021-11-17 PROCEDURE — 93306 TTE W/DOPPLER COMPLETE: CPT

## 2021-11-17 PROCEDURE — 93306 TTE W/DOPPLER COMPLETE: CPT | Mod: 26

## 2021-12-05 ENCOUNTER — EMERGENCY (EMERGENCY)
Facility: HOSPITAL | Age: 51
LOS: 1 days | Discharge: HOME | End: 2021-12-05
Attending: EMERGENCY MEDICINE
Payer: COMMERCIAL

## 2021-12-05 VITALS
OXYGEN SATURATION: 98 % | SYSTOLIC BLOOD PRESSURE: 120 MMHG | TEMPERATURE: 98 F | RESPIRATION RATE: 18 BRPM | DIASTOLIC BLOOD PRESSURE: 80 MMHG | WEIGHT: 169.98 LBS | HEART RATE: 52 BPM | HEIGHT: 63 IN

## 2021-12-05 LAB
ALBUMIN SERPL ELPH-MCNC: 3.7 G/DL — SIGNIFICANT CHANGE UP (ref 3.3–5.2)
ALP SERPL-CCNC: 42 U/L — SIGNIFICANT CHANGE UP (ref 40–120)
ALT FLD-CCNC: 22 U/L — SIGNIFICANT CHANGE UP
ANION GAP SERPL CALC-SCNC: 12 MMOL/L — SIGNIFICANT CHANGE UP (ref 5–17)
AST SERPL-CCNC: 20 U/L — SIGNIFICANT CHANGE UP
BASOPHILS # BLD AUTO: 0.01 K/UL — SIGNIFICANT CHANGE UP (ref 0–0.2)
BASOPHILS NFR BLD AUTO: 0.2 % — SIGNIFICANT CHANGE UP (ref 0–2)
BILIRUB SERPL-MCNC: 0.3 MG/DL — LOW (ref 0.4–2)
BUN SERPL-MCNC: 15.2 MG/DL — SIGNIFICANT CHANGE UP (ref 8–20)
CALCIUM SERPL-MCNC: 8.8 MG/DL — SIGNIFICANT CHANGE UP (ref 8.6–10.2)
CHLORIDE SERPL-SCNC: 104 MMOL/L — SIGNIFICANT CHANGE UP (ref 98–107)
CO2 SERPL-SCNC: 21 MMOL/L — LOW (ref 22–29)
CREAT SERPL-MCNC: 0.99 MG/DL — SIGNIFICANT CHANGE UP (ref 0.5–1.3)
EOSINOPHIL # BLD AUTO: 0.03 K/UL — SIGNIFICANT CHANGE UP (ref 0–0.5)
EOSINOPHIL NFR BLD AUTO: 0.7 % — SIGNIFICANT CHANGE UP (ref 0–6)
GLUCOSE SERPL-MCNC: 242 MG/DL — HIGH (ref 70–99)
HCT VFR BLD CALC: 38.7 % — LOW (ref 39–50)
HGB BLD-MCNC: 12.9 G/DL — LOW (ref 13–17)
IMM GRANULOCYTES NFR BLD AUTO: 0.2 % — SIGNIFICANT CHANGE UP (ref 0–1.5)
LYMPHOCYTES # BLD AUTO: 1.03 K/UL — SIGNIFICANT CHANGE UP (ref 1–3.3)
LYMPHOCYTES # BLD AUTO: 22.4 % — SIGNIFICANT CHANGE UP (ref 13–44)
MAGNESIUM SERPL-MCNC: 1.9 MG/DL — SIGNIFICANT CHANGE UP (ref 1.6–2.6)
MCHC RBC-ENTMCNC: 28.7 PG — SIGNIFICANT CHANGE UP (ref 27–34)
MCHC RBC-ENTMCNC: 33.3 GM/DL — SIGNIFICANT CHANGE UP (ref 32–36)
MCV RBC AUTO: 86.2 FL — SIGNIFICANT CHANGE UP (ref 80–100)
MONOCYTES # BLD AUTO: 0.57 K/UL — SIGNIFICANT CHANGE UP (ref 0–0.9)
MONOCYTES NFR BLD AUTO: 12.4 % — SIGNIFICANT CHANGE UP (ref 2–14)
NEUTROPHILS # BLD AUTO: 2.95 K/UL — SIGNIFICANT CHANGE UP (ref 1.8–7.4)
NEUTROPHILS NFR BLD AUTO: 64.1 % — SIGNIFICANT CHANGE UP (ref 43–77)
PLATELET # BLD AUTO: 132 K/UL — LOW (ref 150–400)
POTASSIUM SERPL-MCNC: 4.2 MMOL/L — SIGNIFICANT CHANGE UP (ref 3.5–5.3)
POTASSIUM SERPL-SCNC: 4.2 MMOL/L — SIGNIFICANT CHANGE UP (ref 3.5–5.3)
PROT SERPL-MCNC: 7 G/DL — SIGNIFICANT CHANGE UP (ref 6.6–8.7)
RBC # BLD: 4.49 M/UL — SIGNIFICANT CHANGE UP (ref 4.2–5.8)
RBC # FLD: 12.8 % — SIGNIFICANT CHANGE UP (ref 10.3–14.5)
SODIUM SERPL-SCNC: 137 MMOL/L — SIGNIFICANT CHANGE UP (ref 135–145)
TROPONIN T SERPL-MCNC: <0.01 NG/ML — SIGNIFICANT CHANGE UP (ref 0–0.06)
TROPONIN T SERPL-MCNC: <0.01 NG/ML — SIGNIFICANT CHANGE UP (ref 0–0.06)
WBC # BLD: 4.6 K/UL — SIGNIFICANT CHANGE UP (ref 3.8–10.5)
WBC # FLD AUTO: 4.6 K/UL — SIGNIFICANT CHANGE UP (ref 3.8–10.5)

## 2021-12-05 PROCEDURE — 99285 EMERGENCY DEPT VISIT HI MDM: CPT

## 2021-12-05 PROCEDURE — 80053 COMPREHEN METABOLIC PANEL: CPT

## 2021-12-05 PROCEDURE — 93010 ELECTROCARDIOGRAM REPORT: CPT

## 2021-12-05 PROCEDURE — 84484 ASSAY OF TROPONIN QUANT: CPT

## 2021-12-05 PROCEDURE — 71045 X-RAY EXAM CHEST 1 VIEW: CPT | Mod: 26

## 2021-12-05 PROCEDURE — 71045 X-RAY EXAM CHEST 1 VIEW: CPT

## 2021-12-05 PROCEDURE — 85025 COMPLETE CBC W/AUTO DIFF WBC: CPT

## 2021-12-05 PROCEDURE — 93005 ELECTROCARDIOGRAM TRACING: CPT

## 2021-12-05 PROCEDURE — 36415 COLL VENOUS BLD VENIPUNCTURE: CPT

## 2021-12-05 PROCEDURE — 99284 EMERGENCY DEPT VISIT MOD MDM: CPT | Mod: 25

## 2021-12-05 PROCEDURE — 83735 ASSAY OF MAGNESIUM: CPT

## 2021-12-05 NOTE — CONSULT NOTE ADULT - SUBJECTIVE AND OBJECTIVE BOX
[facility] [id number]            [facility]                          [address] [address]                       Phone: [phone]. Fax:[phone]                      ________________________________________________    HPI:  52 y/o M hx of COVID + , CABG, HTN, DM presents s/p syncope. per patient, states he walked into bathroom and saw his daughter "falling" and became lightheaded. states he sat on bathtub edge and then syncopized. states he did not hit his head, not on blood thinners. currently feels "normal." endorsing weakness, received monoclonal antibodies for covid 2 days ago, is on day 5 of covid. denies chest pain. denies shortness of breath. denies abdominal pain. denies nausea or vomiting. Cardiology consulted for syncope and collapse.    HOME MEDICATIONS:  ascorbic acid 500 mg oral tablet: 1 tab(s) orally 3 times a day (14 Jul 2018 08:58)  aspirin 325 mg oral delayed release tablet: 1 tab(s) orally once a day (14 Jul 2018 08:58)  docusate sodium 100 mg oral capsule: 1 cap(s) orally 3 times a day (14 Jul 2018 08:58)  ferrous sulfate 325 mg (65 mg elemental iron) oral tablet: orally 2 times a day (14 Jul 2018 08:58)  folic acid 0.8 mg oral tablet: 1 tab(s) orally once a day (14 Jul 2018 08:58)  polyethylene glycol 3350 oral powder for reconstitution: 17 gram(s) orally once a day (14 Jul 2018 08:58)  Pravachol 20 mg oral tablet: 1 tab(s) orally once a day (14 Jul 2018 08:34)  senna oral tablet: 2 tab(s) orally once a day (at bedtime) (14 Jul 2018 08:58)      ROS: All review of systems negative unless indicated otherwise below.                         PHYSICAL EXAM:    GENERAL: NAD  NECK: Supple, No JVD  NERVOUS SYSTEM:  Alert & Oriented X3, non focal neuro exam.   CHEST/LUNG: clear lungs, No rales, rhonchi, wheezing, or rubs  HEART: Regular rate and rhythm; s1 and s2 auscultated, No murmurs, rubs, or gallops  ABDOMEN: Soft, Nontender, Nondistended; Bowel sounds present and normoactive.   EXTREMITIES:  2+ Peripheral Pulses, No clubbing, cyanosis, or edema       Vital Signs Last 24 Hrs  T(C): 36.7 (05 Dec 2021 12:08), Max: 36.7 (05 Dec 2021 12:08)  T(F): 98 (05 Dec 2021 12:08), Max: 98 (05 Dec 2021 12:08)  HR: 52 (05 Dec 2021 12:08) (52 - 52)  BP: 120/80 (05 Dec 2021 12:08) (120/80 - 120/80)  BP(mean): --  RR: 18 (05 Dec 2021 12:08) (18 - 18)  SpO2: 98% (05 Dec 2021 12:08) (98% - 98%)                                                  INTAKE AND OUTPUT - 48 HOUR TREND                                                        LAB RESULTS                 COMPLETE BLOOD COUNT( 05 Dec 2021 14:00 )                            12.9 g/dL<L>  4.60 K/uL )---------------( 132 K/uL<L>                        38.7 %<L>      Automated Differential     Auto Basophil # - 0.01 K/uL  Auto Basophil % - 0.2 %  Auto Eosinophil # - 0.03 K/uL  Auto Eosinophil % - 0.7 %  Auto Immature Granulocyte # - X      Auto Immature Granulocyte % - 0.2 %  Auto Lymphocyte # - 1.03 K/uL  Auto Lymphocyte % - 22.4 %  Auto Monocyte # - 0.57 K/uL  Auto Monocyte % - 12.4 %  Auto Neutrophil # - 2.95 K/uL  Auto Neutrophil % - 64.1 %                                  CHEMISTRY                 Basic Metabolic Panel (12-05-21 @ 14:00)    137  |  104  |  15.2  ----------------------------<  242<H>  4.2   |  21.0<L>  |  0.99    Ca    8.8      05 Dec 2021 14:00  Mg     1.9     12-05                    Liver Functions (12-05-21 @ 14:00))  TPro  7.0  /  Alb  3.7  /  TBili  0.3  /  DBili  x   /  AST  20  /  ALT  22  /  AlkPhos  42                                 Cardiac Enzymes   ( 05 Dec 2021 16:56 )  Troponin T  <0.01,  CPK  X    , CKMB  X    , BNP X        , ( 05 Dec 2021 14:00 )  Troponin T  <0.01,  CPK  X    , CKMB  X    , BNP X                                  RADIOLOGY RESULTS: Personally visualized   < from: Xray Chest 1 View-PORTABLE IMMEDIATE (03.02.19 @ 12:31) >  IMPRESSION:   No evidence of active chest disease.            < end of copied text >                          CARDIOLOGY RESULTS: Official Report/Preliminary Verbal Reports  < from: TTE Echo Complete w/o Contrast w/ Doppler (11.17.21 @ 09:11) >  Summary:   1. Left ventricular ejection fraction, by visual estimation, is >75%.   2. Hyperdynamic global left ventricular systolic function.   3. Severely increased LV wall thickness.   4. Spectral Doppler shows impaired relaxation pattern of left ventricular myocardial filling (Grade I diastolic dysfunction).   5. There is severe concentric left ventricular hypertrophy.   6. Mild mitral valve regurgitation.    MD Caroline Electronically signed on 11/17/2021 at 1:49:23 PM      < end of copied text >    < from: Cardiac Cath Lab - Adult (07.06.18 @ 16:16) >  VENTRICLES: EF calculated by contrast ventriculography was 55 %.  CORONARY VESSELS: The coronary circulation is right dominant.  LM:   --  Distal left main: There was a 20 % stenosis.  LAD:   --  LAD: The vessel was medium sized and mildly calcified.  --  Proximal LAD: There was a tubular 50 % stenosis in the proximal third  of the vessel segment. In a second lesion, there was a discrete 90 %  stenosis in the middle third of the vessel segment.  --  Mid LAD: There was a tubular 50 % stenosis in the distal third of the  vessel segment.  --  Distal LAD: There was a tubular 80 % stenosis in the middle third of  the vessel segment.  --  D1: The vessel was medium sized. There was a tubular 90 % stenosis in  the proximal third of the vessel segment.  CX:   --  Proximal circumflex: There was a 70 % stenosis.  --  Mid circumflex: There was a tubular 90 % stenosis.  --  OM1: There was a tubular 80 % stenosis in the proximal third of the  vessel segment.  --  OM2: The vessel was medium sized. There was a discrete 60 % stenosis in  the middle third of the vessel segment.  RCA:   --  Proximal RCA: There was a 30 % stenosis.  --  Mid RCA: There was a 40 % stenosis.  --  Distal RCA: There was a discrete 90 % stenosis.  --  RPDA: The vessel was medium sized. There was a tubular 80 % stenosis in  the proximal third of the vessel segment. In a second lesion, there was a  tubular 70 % stenosis in the middle third of the vessel segment. In a  third lesion, there was a discrete 100 % stenosis. Distally fills by  collaterals from the left coronary system  --  RPLS: The vessel was small to medium sized. Angiography showed moderate  atherosclerosis. The artery was supplied by collaterals.  COMPLICATIONS: No complications occurred during the cath lab visit.  DIAGNOSTIC IMPRESSIONS: Multivessel CAD  Normal LV systolic function  DIAGNOSTIC RECOMMENDATIONS: Recommend evaluation for CABG with LIMA to LAD  and grafts to Diagonal 1, OM1, OM2 and if possible RPDA.  Prepared and signed by  Escobar Solis MD    < end of copied text >

## 2021-12-05 NOTE — ED PROVIDER NOTE - NSICDXPASTMEDICALHX_GEN_ALL_CORE_FT
PAST MEDICAL HISTORY:  HLD (hyperlipidemia)     HTN (hypertension)     IDDM (insulin dependent diabetes mellitus)

## 2021-12-05 NOTE — CONSULT NOTE ADULT - ATTENDING COMMENTS
covid./  syncope. abnormal EKG.   symptoms due to vasovagal or covid.    Abnormal EKG. unchanged.   outpatient stress test for his abnormal EKG. as history of coronary artery bypass graft .   ct C[facility] meds

## 2021-12-05 NOTE — ED PROVIDER NOTE - CLINICAL SUMMARY MEDICAL DECISION MAKING FREE TEXT BOX
52 y/o M hx of COVID + , CABG, HTN, DM presents s/p syncope. per patient, states he walked into bathroom and saw his daughter "falling" and became lightheaded. states he sat on bathtub edge and then syncopized.   benign exam, 98% on O2, not in distress, no signs of increased work of breathing.  likely vasovagal syncope given hx   ekg - r/o arrhythmia   cardiac monitor

## 2021-12-05 NOTE — ED PROVIDER NOTE - NSICDXFAMILYHX_GEN_ALL_CORE_FT
FAMILY HISTORY:  Father  Still living? Unknown  Family history of cerebrovascular accident (CVA), Age at diagnosis: Age Unknown    Mother  Still living? Unknown  Family history of cerebrovascular accident (CVA), Age at diagnosis: Age Unknown

## 2021-12-05 NOTE — CHART NOTE - NSCHARTNOTEFT_GEN_A_CORE
full consult note pending:   syncope.  likely due to covid and vaso vagal.   recommend hydration  ECg very abnormal Unchanged.  did not get a stress test after his CABG  no new angina symptoms.   he should get a outpaitent stress test to evaluate for ischemia.  for now hydration   No further in-patient cardiac work-up/management is needed.  Follow-up in cardiology office in 2 weeks.

## 2021-12-05 NOTE — ED PROVIDER NOTE - OBJECTIVE STATEMENT
50 y/o M hx of COVID + , CABG, HTN, DM presents s/p syncope. per patient, states he walked into bathroom and saw his daughter "falling" and became lightheaded. states he sat on bathtub edge and then syncopized. states he did not hit his head, not on blood thinners. currently feels "normal." endorsing weakness, received monoclonal antibodies for covid 2 days ago, is on day 5 of covid. denies chest pain. denies shortness of breath. denies abdominal pain. denies nausea or vomiting.

## 2021-12-05 NOTE — ED PROVIDER NOTE - PHYSICAL EXAMINATION
General: Well appearing male in no acute distress, 98% on pulse ox  HEENT: Normocephalic, atraumatic. Moist mucous membranes. Oropharynx clear. No lymphadenopathy.  Eyes: No scleral icterus. EOMI. NEO.  Neck:. Soft and supple. Full ROM without pain. No midline tenderness  Cardiac: Regular rate and regular rhythm. No murmurs, rubs, gallops. Peripheral pulses 2+ and symmetric. No LE edema.  Resp: Lungs CTAB. Speaking in full sentences. No wheezes, rales or rhonchi.  Abd: Soft, non-tender, non-distended. No guarding or rebound. No scars, masses, or lesions.  Back: Spine midline and non-tender. No CVA tenderness.    Skin: No rashes, abrasions, or lacerations.  Neuro: AO x 3. Moves all extremities symmetrically. Motor strength and sensation grossly intact.

## 2021-12-05 NOTE — CONSULT NOTE ADULT - ASSESSMENT
52 y/o M hx of COVID + , CABG, HTN, DM presents s/p syncope. per patient, states he walked into bathroom and saw his daughter "falling" and became lightheaded. states he sat on bathtub edge and then syncopized. states he did not hit his head, not on blood thinners. currently feels "normal." endorsing weakness, received monoclonal antibodies for covid 2 days ago, is on day 5 of covid. denies chest pain. denies shortness of breath. denies abdominal pain. denies nausea or vomiting. Cardiology consulted for syncope and collapse.    Dehydration   - likely due to covid and vaso vagal.   - recommend hydration  - ECG very abnormal, but unchanged.    - did not get a stress test after his CABG  - currently without angina and anginal equivalents   - recommend outpatient stress test to eval for ischemia   - No further in-patient cardiac work-up/management is needed.  Follow-up in cardiology office in 2 weeks.  
The history, relevant review of systems, past medical and surgical history, medical decision making, and physical examination was documented by the scribe in my presence and I attest to the accuracy of the documentation.

## 2021-12-05 NOTE — ED ADULT NURSE NOTE - OBJECTIVE STATEMENT
pt arrivd c/o syncope episode today and is covid + states received antibody invsion two days ago denies chest pain denies shortness of breath on montior states c/o weakness.

## 2021-12-05 NOTE — ED PROVIDER NOTE - PATIENT PORTAL LINK FT
You can access the FollowMyHealth Patient Portal offered by [facility] by registering at the following website: [email or web address]. By joining [facility]’s FollowMyHealth portal, you will also be able to view your health information using other applications (apps) compatible with our system.

## 2021-12-05 NOTE — ED PROVIDER NOTE - NSFOLLOWUPINSTRUCTIONS_ED_ALL_ED_FT
YOU MUST FOLLOW UP IN THE CARDIOLOGY OFFICE AS SOON AS POSSIBLE FOR FURTHER TESTING.      Syncope    WHAT YOU NEED TO KNOW:    Syncope is also called fainting or passing out. Syncope is a sudden, temporary loss of consciousness, followed by a fall from a standing or sitting position. Syncope ranges from not serious to a sign of a more serious condition that needs to be treated. You can control some health conditions that cause syncope. Your healthcare providers can help you create a plan to manage syncope and prevent episodes.    DISCHARGE INSTRUCTIONS:    Seek care immediately if:   •You are bleeding because you hit your head when you fainted.       •You suddenly have double vision, difficulty speaking, numbness, and cannot move your arms or legs.      •You have chest pain and trouble breathing.      •You vomit blood or material that looks like coffee grounds.      •You see blood in your bowel movement.      Contact your healthcare provider if:   •You have new or worsening symptoms.      •You have another syncope episode.      •You have a headache, fast heartbeat, or feel too dizzy to stand up.      •You have questions or concerns about your condition or care.      Medicines:   •Medicines may be needed to help your heart pump strongly and regularly. Your healthcare provider may also make changes to any medicines that are causing syncope.       •Take your medicine as directed. Contact your healthcare provider if you think your medicine is not helping or if you have side effects. Tell him or her if you are allergic to any medicine. Keep a list of the medicines, vitamins, and herbs you take. Include the amounts, and when and why you take them. Bring the list or the pill bottles to follow-up visits. Carry your medicine list with you in case of an emergency.      Follow up with your doctor as directed: Write down your questions so you remember to ask them during your visits.     Manage syncope:   •Keep a record of your syncope episodes. Include your symptoms and your activity before and after the episode. The record can help your healthcare provider find the cause of your syncope and help you manage episodes.      •Sit or lie down when needed. This includes when you feel dizzy, your throat is getting tight, and your vision changes. Raise your legs above the level of your heart.      •Take slow, deep breaths if you start to breathe faster with anxiety or fear. This can help decrease dizziness and the feeling that you might faint.       •Check your blood pressure often. This is important if you take medicine to lower your blood pressure. Check your blood pressure when you are lying down and when you are standing. Ask how often to check during the day. Keep a record of your blood pressure numbers. Your healthcare provider may use the record to help plan your treatment.  How to take a Blood Pressure           Prevent a syncope episode:   •Move slowly and let yourself get used to one position before you move to another position. This is very important when you change from a lying or sitting position to a standing position. Take some deep breaths before you stand up from a lying position. Stand up slowly. Sudden movements may cause a fainting spell. Sit on the side of the bed or couch for a few minutes before you stand up. If you are on bedrest, try to be upright for about 2 hours each day, or as directed. Do not lock your legs if you are standing for a long period of time. Move your legs and bend your knees to keep blood flowing.      •Follow your healthcare provider's recommendations. Your provider may recommend that you drink more liquids to prevent dehydration. You may also need to have more salt to keep your blood pressure from dropping too low and causing syncope. Your provider will tell you how much liquid and sodium to have each day. He or she will also tell you how much physical activity is safe for you. This will depend on what is causing your syncope.      •Watch for signs of low blood sugar. These include hunger, nervousness, sweating, and fast or fluttery heartbeats. Talk with your healthcare provider about ways to keep your blood sugar level steady.      •Do not strain if you are constipated. You may faint if you strain to have a bowel movement. Walking is the best way to get your bowels moving. Eat foods high in fiber to make it easier to have a bowel movement. Good examples are high-fiber cereals, beans, vegetables, and whole-grain breads. Prune juice may help make bowel movements softer.      •Be careful in hot weather. Heat can cause a syncope episode. Limit activity done outside on hot days. Physical activity in hot weather can lead to dehydration. This can cause an episode.

## 2021-12-23 ENCOUNTER — OUTPATIENT (OUTPATIENT)
Dept: OUTPATIENT SERVICES | Facility: HOSPITAL | Age: 51
LOS: 1 days | End: 2021-12-23
Payer: COMMERCIAL

## 2021-12-23 DIAGNOSIS — R07.89 OTHER CHEST PAIN: ICD-10-CM

## 2021-12-23 DIAGNOSIS — I10 ESSENTIAL (PRIMARY) HYPERTENSION: ICD-10-CM

## 2021-12-23 PROCEDURE — 78452 HT MUSCLE IMAGE SPECT MULT: CPT

## 2021-12-23 PROCEDURE — 93017 CV STRESS TEST TRACING ONLY: CPT

## 2021-12-23 PROCEDURE — 93016 CV STRESS TEST SUPVJ ONLY: CPT

## 2021-12-23 PROCEDURE — A9500: CPT

## 2021-12-23 PROCEDURE — 78452 HT MUSCLE IMAGE SPECT MULT: CPT | Mod: 26

## 2021-12-23 PROCEDURE — 93018 CV STRESS TEST I&R ONLY: CPT

## 2022-03-10 RX ORDER — CHLORHEXIDINE GLUCONATE 213 G/1000ML
1 SOLUTION TOPICAL ONCE
Refills: 0 | Status: DISCONTINUED | OUTPATIENT
Start: 2022-03-11 | End: 2022-03-25

## 2022-03-10 NOTE — H&P PST ADULT - ASSESSMENT
50 y/o M with PMH HLD, DM, CAD S/P CABG(LIMA-LAD, SVG-OM, SVG-Diag, SVG-RPDA 7/9/18) with c/o chest pain and NST with paty infarct ischemia. Presents for [facility] for further evaluation    NPO confirmed  ASA 81 mg pre procedure  Consent to be obtained by IC prior to procedure  50 y/o M with PMH HLD, DM, CAD S/P CABG(LIMA-LAD, SVG-OM, SVG-Diag, SVG-RPDA 7/9/18) with c/o chest pain and NST with paty infarct ischemia. Presents for [facility] for further evaluation    ASA 3  Mallampati 2  GFR  BRA     NPO confirmed  ASA 81 mg pre procedure  Consent to be obtained by IC prior to procedure  52 y/o M with PMH HLD, DM, CAD S/P CABG(LIMA-LAD, SVG-OM, SVG-Diag, SVG-RPDA 7/9/18) with c/o chest pain and NST with paty infarct ischemia. Presents for [facility] for further evaluation    ASA 3  Mallampati 2  BRA 1.0%    NPO confirmed  ASA 81 mg pre procedure  Consent to be obtained by IC prior to procedure

## 2022-03-10 NOTE — H&P PST ADULT - NSICDXPASTMEDICALHX_GEN_ALL_CORE_FT
PAST MEDICAL HISTORY:  HLD (hyperlipidemia)     HTN (hypertension)     IDDM (insulin dependent diabetes mellitus)      PAST MEDICAL HISTORY:  CAD (coronary artery disease)     HLD (hyperlipidemia)     HTN (hypertension)     IDDM (insulin dependent diabetes mellitus)      PAST MEDICAL HISTORY:  CAD (coronary artery disease)     History of 2019 novel coronavirus disease (COVID-19) 12/21    HLD (hyperlipidemia)     HTN (hypertension)     IDDM (insulin dependent diabetes mellitus)

## 2022-03-10 NOTE — H&P PST ADULT - NSICDXPASTSURGICALHX_GEN_ALL_CORE_FT
PAST SURGICAL HISTORY:  No significant past surgical history      PAST SURGICAL HISTORY:  S/P CABG x 4

## 2022-03-10 NOTE — H&P PST ADULT - HISTORY OF PRESENT ILLNESS
50 y/o M with PMH HLD, DM, CAD S/P CABG(LIMA-LAD, SVG-OM, SVG-Diag, SVG-RPDA 7/9/18) with c/o chest pain and NST with paty infarct ischemia. Presents for [facility] for further evaluation   50 y/o M with PMH HLD, DM, CAD S/P CABG(LIMA-LAD, SVG-OM, SVG-Diag, SVG-RPDA 7/9/18) with c/o chest pain and NST with paty infarct ischemia. Presents for [facility] [place] for further evaluation    Symptoms  Angina Class  Ischemic Symptoms    Heart Failure  Systolic/Diastolic/Combined  NYHA Class (within 2 weeks):     Assessment of LVEF (Must be within 6 months):       EF:        Assessed by:        Date:     Prior Cardiac Interventions (LHC, stents, CABG):       PCI's (Date, Stents, Vessels):        CABG (Date, Grafts):     Noninvasive Testing:   Stress Test: Date:        Protocol:        Duration of Exercise:        Symptoms:        EKG Changes:        DTS:        Myocardial Imaging:        Risk Assessment (Low, Medium, High):     Echo (Date, Findings):     Antianginal Therapies:        Beta Blockers:         Calcium Channel Blockers:        Long Acting Nitrates:        Ranexa:     Associated Risk Factors:        Cerebrovascular Disease: N/A       Chronic Lung Disease: N/A       Peripheral Arterial Disease: N/A       Chronic Kidney Disease (if yes, what is GFR): N/A       Uncontrolled Diabetes (if yes, what is HgbA1C or FBS): N/A       Poorly Controlled Hypertension (if yes, what is SBP): N/A       Morbid Obesity (if yes, what is BMI): N/A       History of Recent Ventricular Arrhythmia: N/A       Inability to Ambulate Safely: N/A       Need for Therapeutic Anticoagulation: N/A       Antiplatelet or Contrast Allergy: N/A 52 y/o M with PMH HLD, DM, CAD S/P CABG(LIMA-LAD, SVG-OM, SVG-Diag, SVG-RPDA 18) with c/o chest pain and NST with paty infarct ischemia. Presents for [facility] for further evaluation    Symptoms  Angina Class  Ischemic Symptoms    Heart Failure  Systolic/Diastolic/Combined: NA  NYHA Class (within 2 weeks):     Assessment of LVEF (Must be within 6 months):       EF: 74%       Assessed by: NST       Date: 21    Prior Cardiac Interventions (LHC, stents, CABG):       PCI's (Date, Stents, Vessels):        CABG (Date, Grafts):     Noninvasive Testing:   Stress Test: Date:        < from: Nuclear Stress Test-Exercise (Nuclear Stress Test-Exercise .) (21 @ 09:16) >  PATIENT: SIMRAN VELASCO  : 1970   AGE: 51 (M)   MR#: [id number]  STUDY DATE: 2021  LOCATION: O/P  REF. PHYSICIAN(S):  GENNY FREEMAN MD / Selina  [facility] / [place]  FELLOW: Bess Graff NP  ------------------------------------------------------------------------    TYPE OF TEST: Rest/Stress SESTAMIBI  INDICATION: Chest pain, unspecified (R07.9)  ------------------------------------------------------------------------  HISTORY:  CARDIAC HISTORY: 51 years old malewith DM,  HTN, CAD s/p  CABG with c/o chest pain  RISK FACTORS: Diabetes, Hypertension  MEDICATIONS: Norvasc, Asa, Atenolol, Cozaar, Metformin,  Pravachol, Iron  ------------------------------------------------------------------------    BASELINE ELECTROCARDIOGRAM:  Rhythm: Normal Sinus Rhythm    ------------------------------------------------------------------------    EXERCISE RESULTS:  TIME      SPEED    GRADE  HR      BP  Baseline  Standing   N/A  56  146/97  03:00     1.7 MPH     0%  84  138/83  06:00     1.7 MPH     5% 105  137/87  09:00     1.7 MPH    10% 125  12:00     2.5 MPH    12% 130  15:00     3.4 MPH    14% 104  112/79  06:00     Recovery        80  127/81  ------------------------------------------------------------------------    Protocol: Modified Shar   Exercise Duration: 15: 00 Min  HR: Baseline HR: 56 bpm   Peak HR: 130 bpm (78% of MPHR)  MPHR: 167 bpm   85% of MPHR: 142 bpm  BP: Baseline BP: 146/97 mmHg   Peak BP: 146/97 mmHg   Peak  RPP: [id number] (Rate Pressure Product)  Last Caffeine intake: 24 hrs  Terminated: Sub Max HR, Fatigue  Performance: 14 METS, Excellent for age and gender.  Comments: The patient walked on treadmill, target heart  rate was not achieved.  Meds Admin: Lex[employer].  ------------------------------------------------------------------------    SYMPTOMS/FINDINGS:  Symptoms: dizziness  Chest pain: No chest pain with exercise  ------------------------------------------------------------------------    ECG ABNORMALITIES DURING/AFTER STRESS:   Abnormalities: st depression   ST Changes:ST Depression: 1 mm horizontal in leads I  ,  V4, V5, V6.  ------------------------------------------------------------------------    NEW ARRHYTHMIAS DEVELOPED DURING/AFTER STRESS:  None  ------------------------------------------------------------------------    STRESS TEST IMPRESSIONS:  Exercise capacity: 14 METS, Excellent for age and gender.  78% of MPHR.  Chest Pain: No chest pain with exercise.  Symptom: dizziness.  HR Response: Appropriate.  BP Response: Appropriate.  Heart Rhythm: Normal Sinus Rhythm.  ECG Abnormalities: st depression.  ECG Changes: ST Depression: 1 mm horizontal in leads I  ,  V4, V5, V6.  Arrhythmia: None.    ------------------------------------------------------------------------    NUCLEAR FINDINGS:  The left ventricle was normal LV size. There are small,  moderate to severe defects in mid and apical inferior  walls that are predominantly reversible, suggestive of  infarction with moderate paty-infarct ischemia.  ------------------------------------------------------------------------      GATED ANALYSIS:   Mild hypokineis of mid and apical inferior wall. Post  Stress LVEF 74%.  ------------------------------------------------------------------------    IMPRESSIONS:  * Exercise capacity: 14 METS, Excellent for age and  gender. 78% of MPHR.  * Chest Pain: No chest pain with exercise.  * Symptom: dizziness.  * HR Response: Appropriate.  * BP Response: Appropriate.  * Heart Rhythm: Normal Sinus Rhythm.  * ECG Abnormalities: st depression.  * ECG Changes: ST Depression: 1 mm horizontal in leads I  , V4, V5, V6.  * Arrhythmia: None.  * The left ventricle was normal LV size. There are small,  moderate to severe defects in mid and apical inferior  walls that are predominantly reversible, suggestive of  infarction with moderate paty-infarct ischemia.  *  Mild hypokineis of mid and apical inferior wall. Post  Stress LVEF 74%.  Results d/w  pt while he is in the Stress Lab and sdvied to go to ER to  have LHC. Pt is unwiling at this point.        Echo (Date, Findings):   PROCEDURE DATE:  2021     2D AND M-MODE MEASUREMENTS (normal ranges within parentheses):  Left                 Normal   Aorta/Left            Normal  Ventricle:                    Atrium:  IVIDs         2.55    PHYSICIAN INTERPRETATION:  Left Ventricle: The left ventricular internal cavity size is normal. Left ventricular wall thickness is severely increased.  Global LV systolic function was hyperdynamic. Left ventricular ejection fraction, by visual estimation, is >75%. Spectral Doppler shows impaired relaxation pattern of left ventricular myocardial filling (Grade I diastolic dysfunction).  Right Ventricle: The right ventricular size is normal. RV systolic function is normal.  Left Atrium: Normal left atrial size.  Right Atrium: Normal right atrial size.  Pericardium: There is no evidence of pericardial effusion.  Mitral Valve: The mitral valve is normal in structure. Mild mitral valve regurgitation is seen.  Tricuspid Valve: The tricuspid valve is normal in structure. Trivial tricuspid regurgitation is visualized.  Aortic Valve: The aortic valve is trileaflet. Trivial aortic valve regurgitation is seen.  Pulmonic Valve: Structurally normal pulmonic valve, with normal leaflet excursion. Trace pulmonic valve regurgitation.  Aorta: The aortic root is normal in size and structure.  Pulmonary Artery: The main pulmonary artery is normal in size.  Venous: The inferior vena cava was normal sized, with respiratory size variation greater than 50%.      Summary:   1. Left ventricular ejection fraction, by visual estimation, is >75%.   2. Hyperdynamic global left ventricular systolic function.   3. Severely increased LV wall thickness.   4. Spectral Doppler shows impaired relaxation pattern of left ventricular myocardial filling (Grade I diastolic dysfunction).   5. There is severe concentric left ventricular hypertrophy.   6. Mild mitral valve regurgitation.          Antianginal Therapies:        Beta Blockers:         Calcium Channel Blockers:        Long Acting Nitrates:        Ranexa:     Associated Risk Factors:        Cerebrovascular Disease: N/A       Chronic Lung Disease: N/A       Peripheral Arterial Disease: N/A       Chronic Kidney Disease (if yes, what is GFR): N/A       Uncontrolled Diabetes (if yes, what is HgbA1C or FBS): N/A       Poorly Controlled Hypertension (if yes, what is SBP): N/A       Morbid Obesity (if yes, what is BMI): N/A       History of Recent Ventricular Arrhythmia: N/A       Inability to Ambulate Safely: N/A       Need for Therapeutic Anticoagulation: N/A       Antiplatelet or Contrast Allergy: N/A 50 y/o M with PMH HLD, DM, CAD S/P CABG(LIMA-LAD, SVG-OM, SVG-Diag, SVG-RPDA 18) with c/o chest pain and NST with paty infarct ischemia. Presents for [facility] for further evaluation.  Pt states that he has not had chest pain since december. Presents for [facility] for further evaluation.    Symptoms  Angina Class II  Ischemic Symptoms CABALLERO    Heart Failure  Systolic/Diastolic/Combined: NA  NYHA Class (within 2 weeks):     Assessment of LVEF (Must be within 6 months):       EF: 74%       Assessed by: NST       Date: 21    Prior Cardiac Interventions (LHC, stents, CABG):       CABG (Date, Grafts): CABG(LIMA-LAD, SVG-OM, SVG-Diag, SVG-RPDA 18)    Noninvasive Testing:   Stress Test: Date:        < from: Nuclear Stress Test-Exercise (Nuclear Stress Test-Exercise .) (21 @ 09:16) >  PATIENT: SIMRAN VELASCO  : 1970   AGE: 51 (M)   MR#: [id number]  STUDY DATE: 2021  LOCATION: O/P  REF. PHYSICIAN(S):  GENNY FREEMAN MD / Selina  [facility] / [place] [facility]  FELLOW: Bess Graff NP  ------------------------------------------------------------------------    TYPE OF TEST: Rest/Stress SESTAMIBI  INDICATION: Chest pain, unspecified (R07.9)  ------------------------------------------------------------------------  HISTORY:  CARDIAC HISTORY: 51 years old malewith DM,  HTN, CAD s/p  CABG with c/o chest pain  RISK FACTORS: Diabetes, Hypertension  MEDICATIONS: Norvasc, Asa, Atenolol, Cozaar, Metformin,  Pravachol, Iron  ------------------------------------------------------------------------    BASELINE ELECTROCARDIOGRAM:  Rhythm: Normal Sinus Rhythm    ------------------------------------------------------------------------    EXERCISE RESULTS:  TIME      SPEED    GRADE  HR      BP  Baseline  Standing   N/A  56  146/97  03:00     1.7 MPH     0%  84  138/83  06:00     1.7 MPH     5% 105  137/87  09:00     1.7 MPH    10% 125  12:00     2.5 MPH    12% 130  15:00     3.4 MPH    14% 104  112/79  06:00     Recovery        80  127/81  ------------------------------------------------------------------------    Protocol: Modified Shar   Exercise Duration: 15: 00 Min  HR: Baseline HR: 56 bpm   Peak HR: 130 bpm (78% of MPHR)  MPHR: 167 bpm   85% of MPHR: 142 bpm  BP: Baseline BP: 146/97 mmHg   Peak BP: 146/97 mmHg   Peak  RPP: [id number] (Rate Pressure Product)  Last Caffeine intake: 24 hrs  Terminated: Sub Max HR, Fatigue  Performance: 14 METS, Excellent for age and gender.  Comments: The patient walked on treadmill, target heart  rate was not achieved.  Meds Admin: [employer].  ------------------------------------------------------------------------    SYMPTOMS/FINDINGS:  Symptoms: dizziness  Chest pain: No chest pain with exercise  ------------------------------------------------------------------------    ECG ABNORMALITIES DURING/AFTER STRESS:   Abnormalities: st depression   ST Changes:ST Depression: 1 mm horizontal in leads I  ,  V4, V5, V6.  ------------------------------------------------------------------------    NEW ARRHYTHMIAS DEVELOPED DURING/AFTER STRESS:  None  ------------------------------------------------------------------------    STRESS TEST IMPRESSIONS:  Exercise capacity: 14 METS, Excellent for age and gender.  78% of MPHR.  Chest Pain: No chest pain with exercise.  Symptom: dizziness.  HR Response: Appropriate.  BP Response: Appropriate.  Heart Rhythm: Normal Sinus Rhythm.  ECG Abnormalities: st depression.  ECG Changes: ST Depression: 1 mm horizontal in leads I  ,  V4, V5, V6.  Arrhythmia: None.    ------------------------------------------------------------------------    NUCLEAR FINDINGS:  The left ventricle was normal LV size. There are small,  moderate to severe defects in mid and apical inferior  walls that are predominantly reversible, suggestive of  infarction with moderate paty-infarct ischemia.  ------------------------------------------------------------------------      GATED ANALYSIS:   Mild hypokineis of mid and apical inferior wall. Post  Stress LVEF 74%.  ------------------------------------------------------------------------    IMPRESSIONS:  * Exercise capacity: 14 METS, Excellent for age and  gender. 78% of MPHR.  * Chest Pain: No chest pain with exercise.  * Symptom: dizziness.  * HR Response: Appropriate.  * BP Response: Appropriate.  * Heart Rhythm: Normal Sinus Rhythm.  * ECG Abnormalities: st depression.  * ECG Changes: ST Depression: 1 mm horizontal in leads I  , V4, V5, V6.  * Arrhythmia: None.  * The left ventricle was normal LV size. There are small,  moderate to severe defects in mid and apical inferior  walls that are predominantly reversible, suggestive of  infarction with moderate paty-infarct ischemia.  *  Mild hypokineis of mid and apical inferior wall. Post  Stress LVEF 74%.  Results d/w  pt while he is in the Stress Lab and sdvied to go to ER to  have LHC. Pt is unwiling at this point.        Echo (Date, Findings):   PROCEDURE DATE:  2021     2D AND M-MODE MEASUREMENTS (normal ranges within parentheses):  Left                 Normal   Aorta/Left            Normal  Ventricle:                    Atrium:  IVIDs         2.55    PHYSICIAN INTERPRETATION:  Left Ventricle: The left ventricular internal cavity size is normal. Left ventricular wall thickness is severely increased.  Global LV systolic function was hyperdynamic. Left ventricular ejection fraction, by visual estimation, is >75%. Spectral Doppler shows impaired relaxation pattern of left ventricular myocardial filling (Grade I diastolic dysfunction).  Right Ventricle: The right ventricular size is normal. RV systolic function is normal.  Left Atrium: Normal left atrial size.  Right Atrium: Normal right atrial size.  Pericardium: There is no evidence of pericardial effusion.  Mitral Valve: The mitral valve is normal in structure. Mild mitral valve regurgitation is seen.  Tricuspid Valve: The tricuspid valve is normal in structure. Trivial tricuspid regurgitation is visualized.  Aortic Valve: The aortic valve is trileaflet. Trivial aortic valve regurgitation is seen.  Pulmonic Valve: Structurally normal pulmonic valve, with normal leaflet excursion. Trace pulmonic valve regurgitation.  Aorta: The aortic root is normal in size and structure.  Pulmonary Artery: The main pulmonary artery is normal in size.  Venous: The inferior vena cava was normal sized, with respiratory size variation greater than 50%.      Summary:   1. Left ventricular ejection fraction, by visual estimation, is >75%.   2. Hyperdynamic global left ventricular systolic function.   3. Severely increased LV wall thickness.   4. Spectral Doppler shows impaired relaxation pattern of left ventricular myocardial filling (Grade I diastolic dysfunction).   5. There is severe concentric left ventricular hypertrophy.   6. Mild mitral valve regurgitation.          Antianginal Therapies:        Beta Blockers:         Calcium Channel Blockers:        Long Acting Nitrates:        Ranexa:     Associated Risk Factors:        Cerebrovascular Disease: N/A       Chronic Lung Disease: N/A       Peripheral Arterial Disease: N/A       Chronic Kidney Disease (if yes, what is GFR): N/A       Uncontrolled Diabetes (if yes, what is HgbA1C or FBS): N/A       Poorly Controlled Hypertension (if yes, what is SBP): N/A       Morbid Obesity (if yes, what is BMI): N/A       History of Recent Ventricular Arrhythmia: N/A       Inability to Ambulate Safely: N/A       Need for Therapeutic Anticoagulation: N/A       Antiplatelet or Contrast Allergy: N/A 52 y/o M with PMH HLD, DM, CAD S/P CABG(LIMA-LAD, SVG-OM, SVG-Diag, SVG-RPDA 18) with c/o chest pain and NST with paty infarct ischemia. Presents for [facility] for further evaluation.  Pt states that he has not had chest pain since december. Presents for [facility] for further evaluation.    Symptoms  Angina Class II  Ischemic Symptoms CABALLERO    Heart Failure  Systolic/Diastolic/Combined: NA  NYHA Class (within 2 weeks):     Assessment of LVEF (Must be within 6 months):       EF: 74%       Assessed by: NST       Date: 21    Prior Cardiac Interventions (LHC, stents, CABG):       CABG (Date, Grafts): CABG(LIMA-LAD, SVG-OM, SVG-Diag, SVG-RPDA 18)    Noninvasive Testing:   Stress Test: Date:        < from: Nuclear Stress Test-Exercise (Nuclear Stress Test-Exercise .) (21 @ 09:16) >  PATIENT: SIMRAN VELASCO  : 1970   AGE: 51 (M)   MR#: [id number]  STUDY DATE: 2021  LOCATION: O/P  REF. PHYSICIAN(S):  GENNY FREEMAN MD / Selina  [facility] / [place] [facility]  FELLOW: Bess Graff NP  ------------------------------------------------------------------------    TYPE OF TEST: Rest/Stress SESTAMIBI  INDICATION: Chest pain, unspecified (R07.9)  ------------------------------------------------------------------------  HISTORY:  CARDIAC HISTORY: 51 years old malewith DM,  HTN, CAD s/p  CABG with c/o chest pain  RISK FACTORS: Diabetes, Hypertension  MEDICATIONS: Norvasc, Asa, Atenolol, Cozaar, Metformin,  Pravachol, Iron  ------------------------------------------------------------------------    BASELINE ELECTROCARDIOGRAM:  Rhythm: Normal Sinus Rhythm    ------------------------------------------------------------------------    EXERCISE RESULTS:  TIME      SPEED    GRADE  HR      BP  Baseline  Standing   N/A  56  146/97  03:00     1.7 MPH     0%  84  138/83  06:00     1.7 MPH     5% 105  137/87  09:00     1.7 MPH    10% 125  12:00     2.5 MPH    12% 130  15:00     3.4 MPH    14% 104  112/79  06:00     Recovery        80  127/81  ------------------------------------------------------------------------    Protocol: Modified Shar   Exercise Duration: 15: 00 Min  HR: Baseline HR: 56 bpm   Peak HR: 130 bpm (78% of MPHR)  MPHR: 167 bpm   85% of MPHR: 142 bpm  BP: Baseline BP: 146/97 mmHg   Peak BP: 146/97 mmHg   Peak  RPP: [id number] (Rate Pressure Product)  Last Caffeine intake: 24 hrs  Terminated: Sub Max HR, Fatigue  Performance: 14 METS, Excellent for age and gender.  Comments: The patient walked on treadmill, target heart  rate was not achieved.  Meds Admin: Gecko TVan.  ------------------------------------------------------------------------    SYMPTOMS/FINDINGS:  Symptoms: dizziness  Chest pain: No chest pain with exercise  ------------------------------------------------------------------------    ECG ABNORMALITIES DURING/AFTER STRESS:   Abnormalities: st depression   ST Changes:ST Depression: 1 mm horizontal in leads I  ,  V4, V5, V6.  ------------------------------------------------------------------------    NEW ARRHYTHMIAS DEVELOPED DURING/AFTER STRESS:  None  ------------------------------------------------------------------------    STRESS TEST IMPRESSIONS:  Exercise capacity: 14 METS, Excellent for age and gender.  78% of MPHR.  Chest Pain: No chest pain with exercise.  Symptom: dizziness.  HR Response: Appropriate.  BP Response: Appropriate.  Heart Rhythm: Normal Sinus Rhythm.  ECG Abnormalities: st depression.  ECG Changes: ST Depression: 1 mm horizontal in leads I  ,  V4, V5, V6.  Arrhythmia: None.    ------------------------------------------------------------------------    NUCLEAR FINDINGS:  The left ventricle was normal LV size. There are small,  moderate to severe defects in mid and apical inferior  walls that are predominantly reversible, suggestive of  infarction with moderate paty-infarct ischemia.  ------------------------------------------------------------------------      GATED ANALYSIS:   Mild hypokineis of mid and apical inferior wall. Post  Stress LVEF 74%.  ------------------------------------------------------------------------    IMPRESSIONS:  * Exercise capacity: 14 METS, Excellent for age and  gender. 78% of MPHR.  * Chest Pain: No chest pain with exercise.  * Symptom: dizziness.  * HR Response: Appropriate.  * BP Response: Appropriate.  * Heart Rhythm: Normal Sinus Rhythm.  * ECG Abnormalities: st depression.  * ECG Changes: ST Depression: 1 mm horizontal in leads I  , V4, V5, V6.  * Arrhythmia: None.  * The left ventricle was normal LV size. There are small,  moderate to severe defects in mid and apical inferior  walls that are predominantly reversible, suggestive of  infarction with moderate paty-infarct ischemia.  *  Mild hypokineis of mid and apical inferior wall. Post  Stress LVEF 74%.  Results d/w  pt while he is in the Stress Lab and sdvied to go to ER to  have LHC. Pt is unwiling at this point.        Echo (Date, Findings):   PROCEDURE DATE:  2021     2D AND M-MODE MEASUREMENTS (normal ranges within parentheses):  Left                 Normal   Aorta/Left            Normal  Ventricle:                    Atrium:  IVIDs         2.55    PHYSICIAN INTERPRETATION:  Left Ventricle: The left ventricular internal cavity size is normal. Left ventricular wall thickness is severely increased.  Global LV systolic function was hyperdynamic. Left ventricular ejection fraction, by visual estimation, is >75%. Spectral Doppler shows impaired relaxation pattern of left ventricular myocardial filling (Grade I diastolic dysfunction).  Right Ventricle: The right ventricular size is normal. RV systolic function is normal.  Left Atrium: Normal left atrial size.  Right Atrium: Normal right atrial size.  Pericardium: There is no evidence of pericardial effusion.  Mitral Valve: The mitral valve is normal in structure. Mild mitral valve regurgitation is seen.  Tricuspid Valve: The tricuspid valve is normal in structure. Trivial tricuspid regurgitation is visualized.  Aortic Valve: The aortic valve is trileaflet. Trivial aortic valve regurgitation is seen.  Pulmonic Valve: Structurally normal pulmonic valve, with normal leaflet excursion. Trace pulmonic valve regurgitation.  Aorta: The aortic root is normal in size and structure.  Pulmonary Artery: The main pulmonary artery is normal in size.  Venous: The inferior vena cava was normal sized, with respiratory size variation greater than 50%.      Summary:   1. Left ventricular ejection fraction, by visual estimation, is >75%.   2. Hyperdynamic global left ventricular systolic function.   3. Severely increased LV wall thickness.   4. Spectral Doppler shows impaired relaxation pattern of left ventricular myocardial filling (Grade I diastolic dysfunction).   5. There is severe concentric left ventricular hypertrophy.   6. Mild mitral valve regurgitation.          Antianginal Therapies:        Beta Blockers:  yes       Calcium Channel Blockers: yes       Long Acting Nitrates:        Ranexa:     Associated Risk Factors:        Cerebrovascular Disease: N/A       Chronic Lung Disease: N/A       Peripheral Arterial Disease: N/A       Chronic Kidney Disease (if yes, what is GFR): N/A       Uncontrolled Diabetes (if yes, what is HgbA1C or FBS): N/A       Poorly Controlled Hypertension (if yes, what is SBP): N/A       Morbid Obesity (if yes, what is BMI): N/A       History of Recent Ventricular Arrhythmia: N/A       Inability to Ambulate Safely: N/A       Need for Therapeutic Anticoagulation: N/A       Antiplatelet or Contrast Allergy: N/A 52 y/o M with PMH HLD, DM, CAD S/P CABG(LIMA-LAD, SVG-OM, SVG-Diag, SVG-RPDA 18), COVID  with c/o chest pain and NST with paty infarct ischemia. Presents for [facility] for further evaluation.  Pt states that he has not had chest pain since december. Presents for [facility] for further evaluation.    Symptoms  Angina Class II  Ischemic Symptoms CABALLERO    Heart Failure  Systolic/Diastolic/Combined: NA  NYHA Class (within 2 weeks):     Assessment of LVEF (Must be within 6 months):       EF: 74%       Assessed by: NST       Date: 21    Prior Cardiac Interventions (LHC, stents, CABG):       CABG (Date, [place]): CABG(LIMA-LAD, SVG-OM, SVG-Diag, SVG-RPDA 18)    Noninvasive Testing:   Stress Test: Date:        < from: Nuclear Stress Test-Exercise (Nuclear Stress Test-Exercise .) (21 @ 09:16) >  PATIENT: SIMRAN VELASCO  : 1970   AGE: 51 (M)   MR#: [id number]  STUDY DATE: 2021  LOCATION: O/P  REF. PHYSICIAN(S):  GENNY FREEMAN MD / [place]  [employer] [facility] / [facility] [place] [facility]  FELLOW: Bess Graff NP  ------------------------------------------------------------------------    TYPE OF TEST: Rest/Stress SESTAMIBI  INDICATION: Chest pain, unspecified (R07.9)  ------------------------------------------------------------------------  HISTORY:  CARDIAC HISTORY: 51 years old malewith DM,  HTN, CAD s/p  CABG with c/o chest pain  RISK FACTORS: Diabetes, Hypertension  MEDICATIONS: Norvasc, Asa, Atenolol, Cozaar, Metformin,  Pravachol, Iron  ------------------------------------------------------------------------    BASELINE ELECTROCARDIOGRAM:  Rhythm: Normal Sinus Rhythm    ------------------------------------------------------------------------    EXERCISE RESULTS:  TIME      SPEED    GRADE  HR      BP  Baseline  Standing   N/A  56  146/97  03:00     1.7 MPH     0%  84  138/83  06:00     1.7 MPH     5% 105  137/87  09:00     1.7 MPH    10% 125  12:00     2.5 MPH    12% 130  15:00     3.4 MPH    14% 104  112/79  06:00     Recovery        80  127/81  ------------------------------------------------------------------------    Protocol: Modified Shar   Exercise Duration: 15: 00 Min  HR: Baseline HR: 56 bpm   Peak HR: 130 bpm (78% of MPHR)  MPHR: 167 bpm   85% of MPHR: 142 bpm  BP: Baseline BP: 146/97 mmHg   Peak BP: 146/97 mmHg   Peak  RPP: [id number] (Rate Pressure Product)  Last Caffeine intake: 24 hrs  Terminated: Sub Max HR, Fatigue  Performance: 14 METS, Excellent for age and gender.  Comments: The patient walked on treadmill, target heart  rate was not achieved.  Meds Admin: Lexiscan.  ------------------------------------------------------------------------    SYMPTOMS/FINDINGS:  Symptoms: dizziness  Chest pain: No chest pain with exercise  ------------------------------------------------------------------------    ECG ABNORMALITIES DURING/AFTER STRESS:   Abnormalities: st depression   ST Changes:ST Depression: 1 mm horizontal in leads I  ,  V4, V5, V6.  ------------------------------------------------------------------------    NEW ARRHYTHMIAS DEVELOPED DURING/AFTER STRESS:  None  ------------------------------------------------------------------------    STRESS TEST IMPRESSIONS:  Exercise capacity: 14 METS, Excellent for age and gender.  78% of MPHR.  Chest Pain: No chest pain with exercise.  Symptom: dizziness.  HR Response: Appropriate.  BP Response: Appropriate.  Heart Rhythm: Normal Sinus Rhythm.  ECG Abnormalities: st depression.  ECG Changes: ST Depression: 1 mm horizontal in leads I  ,  V4, V5, V6.  Arrhythmia: None.    ------------------------------------------------------------------------    NUCLEAR FINDINGS:  The left ventricle was normal LV size. There are small,  moderate to severe defects in mid and apical inferior  walls that are predominantly reversible, suggestive of  infarction with moderate paty-infarct ischemia.  ------------------------------------------------------------------------      GATED ANALYSIS:   Mild hypokineis of mid and apical inferior wall. Post  Stress LVEF 74%.  ------------------------------------------------------------------------    IMPRESSIONS:  * Exercise capacity: 14 METS, Excellent for age and  gender. 78% of MPHR.  * Chest Pain: No chest pain with exercise.  * Symptom: dizziness.  * HR Response: Appropriate.  * BP Response: Appropriate.  * Heart Rhythm: Normal Sinus Rhythm.  * ECG Abnormalities: st depression.  * ECG Changes: ST Depression: 1 mm horizontal in leads I  , V4, V5, V6.  * Arrhythmia: None.  * The left ventricle was normal LV size. There are small,  moderate to severe defects in mid and apical inferior  walls that are predominantly reversible, suggestive of  infarction with moderate paty-infarct ischemia.  *  Mild hypokineis of mid and apical inferior wall. Post  Stress LVEF 74%.  Results d/w  pt while he is in the Stress Lab and sdvied to go to ER to  have LHC. Pt is unwiling at this point.        Echo (Date, Findings):   PROCEDURE DATE:  2021     2D AND M-MODE MEASUREMENTS (normal ranges within parentheses):  Left                 Normal   Aorta/Left            Normal  Ventricle:                    Atrium:  IVIDs         2.55    PHYSICIAN INTERPRETATION:  Left Ventricle: The left ventricular internal cavity size is normal. Left ventricular wall thickness is severely increased.  Global LV systolic function was hyperdynamic. Left ventricular ejection fraction, by visual estimation, is >75%. Spectral Doppler shows impaired relaxation pattern of left ventricular myocardial filling (Grade I diastolic dysfunction).  Right Ventricle: The right ventricular size is normal. RV systolic function is normal.  Left Atrium: Normal left atrial size.  Right Atrium: Normal right atrial size.  Pericardium: There is no evidence of pericardial effusion.  Mitral Valve: The mitral valve is normal in structure. Mild mitral valve regurgitation is seen.  Tricuspid Valve: The tricuspid valve is normal in structure. Trivial tricuspid regurgitation is visualized.  Aortic Valve: The aortic valve is trileaflet. Trivial aortic valve regurgitation is seen.  Pulmonic Valve: Structurally normal pulmonic valve, with normal leaflet excursion. Trace pulmonic valve regurgitation.  Aorta: The aortic root is normal in size and structure.  Pulmonary Artery: The main pulmonary artery is normal in size.  Venous: The inferior vena cava was normal sized, with respiratory size variation greater than 50%.      Summary:   1. Left ventricular ejection fraction, by visual estimation, is >75%.   2. Hyperdynamic global left ventricular systolic function.   3. Severely increased LV wall thickness.   4. Spectral Doppler shows impaired relaxation pattern of left ventricular myocardial filling (Grade I diastolic dysfunction).   5. There is severe concentric left ventricular hypertrophy.   6. Mild mitral valve regurgitation.          Antianginal Therapies:        Beta Blockers:  yes       Calcium Channel Blockers: yes       Long Acting Nitrates:        Ranexa:     Associated Risk Factors:        Cerebrovascular Disease: N/A       Chronic Lung Disease: N/A       Peripheral Arterial Disease: N/A       Chronic Kidney Disease (if yes, what is GFR): N/A       Uncontrolled Diabetes (if yes, what is HgbA1C or FBS): N/A       Poorly Controlled Hypertension (if yes, what is SBP): N/A       Morbid Obesity (if yes, what is BMI): N/A       History of Recent Ventricular Arrhythmia: N/A       Inability to Ambulate Safely: N/A       Need for Therapeutic Anticoagulation: N/A       Antiplatelet or Contrast Allergy: N/A

## 2022-03-11 ENCOUNTER — NON-APPOINTMENT (OUTPATIENT)
Age: 52
End: 2022-03-11

## 2022-03-11 ENCOUNTER — TRANSCRIPTION ENCOUNTER (OUTPATIENT)
Age: 52
End: 2022-03-11

## 2022-03-11 ENCOUNTER — OUTPATIENT (OUTPATIENT)
Dept: OUTPATIENT SERVICES | Facility: HOSPITAL | Age: 52
LOS: 1 days | Discharge: HOME | End: 2022-03-11
Payer: COMMERCIAL

## 2022-03-11 VITALS
SYSTOLIC BLOOD PRESSURE: 125 MMHG | TEMPERATURE: 98 F | RESPIRATION RATE: 16 BRPM | OXYGEN SATURATION: 100 % | HEART RATE: 49 BPM | DIASTOLIC BLOOD PRESSURE: 78 MMHG

## 2022-03-11 VITALS
SYSTOLIC BLOOD PRESSURE: 109 MMHG | HEART RATE: 58 BPM | OXYGEN SATURATION: 100 % | DIASTOLIC BLOOD PRESSURE: 67 MMHG | RESPIRATION RATE: 16 BRPM

## 2022-03-11 DIAGNOSIS — Z95.1 PRESENCE OF AORTOCORONARY BYPASS GRAFT: Chronic | ICD-10-CM

## 2022-03-11 DIAGNOSIS — Z95.1 PRESENCE OF AORTOCORONARY BYPASS GRAFT: ICD-10-CM

## 2022-03-11 LAB
A1C WITH ESTIMATED AVERAGE GLUCOSE RESULT: 6.5 % — HIGH (ref 4–5.6)
ALBUMIN SERPL ELPH-MCNC: 4.7 G/DL — SIGNIFICANT CHANGE UP (ref 3.3–5.2)
ALP SERPL-CCNC: 47 U/L — SIGNIFICANT CHANGE UP (ref 40–120)
ALT FLD-CCNC: 15 U/L — SIGNIFICANT CHANGE UP
ANION GAP SERPL CALC-SCNC: 10 MMOL/L — SIGNIFICANT CHANGE UP (ref 5–17)
AST SERPL-CCNC: 19 U/L — SIGNIFICANT CHANGE UP
BASOPHILS # BLD AUTO: 0 K/UL — SIGNIFICANT CHANGE UP (ref 0–0.2)
BASOPHILS NFR BLD AUTO: 0 % — SIGNIFICANT CHANGE UP (ref 0–2)
BILIRUB SERPL-MCNC: 0.4 MG/DL — SIGNIFICANT CHANGE UP (ref 0.4–2)
BLD GP AB SCN SERPL QL: SIGNIFICANT CHANGE UP
BUN SERPL-MCNC: 13.1 MG/DL — SIGNIFICANT CHANGE UP (ref 8–20)
CALCIUM SERPL-MCNC: 9.2 MG/DL — SIGNIFICANT CHANGE UP (ref 8.6–10.2)
CHLORIDE SERPL-SCNC: 104 MMOL/L — SIGNIFICANT CHANGE UP (ref 98–107)
CO2 SERPL-SCNC: 25 MMOL/L — SIGNIFICANT CHANGE UP (ref 22–29)
CREAT SERPL-MCNC: 0.98 MG/DL — SIGNIFICANT CHANGE UP (ref 0.5–1.3)
EGFR: 92 ML/MIN/1.73M2 — SIGNIFICANT CHANGE UP
EOSINOPHIL # BLD AUTO: 0.05 K/UL — SIGNIFICANT CHANGE UP (ref 0–0.5)
EOSINOPHIL NFR BLD AUTO: 0.9 % — SIGNIFICANT CHANGE UP (ref 0–6)
ESTIMATED AVERAGE GLUCOSE: 140 MG/DL — HIGH (ref 68–114)
GIANT PLATELETS BLD QL SMEAR: PRESENT — SIGNIFICANT CHANGE UP
GLUCOSE SERPL-MCNC: 123 MG/DL — HIGH (ref 70–99)
HCT VFR BLD CALC: 40.9 % — SIGNIFICANT CHANGE UP (ref 39–50)
HGB BLD-MCNC: 13.8 G/DL — SIGNIFICANT CHANGE UP (ref 13–17)
LYMPHOCYTES # BLD AUTO: 1.81 K/UL — SIGNIFICANT CHANGE UP (ref 1–3.3)
LYMPHOCYTES # BLD AUTO: 33 % — SIGNIFICANT CHANGE UP (ref 13–44)
MAGNESIUM SERPL-MCNC: 2 MG/DL — SIGNIFICANT CHANGE UP (ref 1.6–2.6)
MANUAL SMEAR VERIFICATION: SIGNIFICANT CHANGE UP
MCHC RBC-ENTMCNC: 28.4 PG — SIGNIFICANT CHANGE UP (ref 27–34)
MCHC RBC-ENTMCNC: 33.7 GM/DL — SIGNIFICANT CHANGE UP (ref 32–36)
MCV RBC AUTO: 84.2 FL — SIGNIFICANT CHANGE UP (ref 80–100)
MONOCYTES # BLD AUTO: 0.38 K/UL — SIGNIFICANT CHANGE UP (ref 0–0.9)
MONOCYTES NFR BLD AUTO: 7 % — SIGNIFICANT CHANGE UP (ref 2–14)
NEUTROPHILS # BLD AUTO: 2.96 K/UL — SIGNIFICANT CHANGE UP (ref 1.8–7.4)
NEUTROPHILS NFR BLD AUTO: 53.9 % — SIGNIFICANT CHANGE UP (ref 43–77)
PLAT MORPH BLD: NORMAL — SIGNIFICANT CHANGE UP
PLATELET # BLD AUTO: 121 K/UL — LOW (ref 150–400)
POTASSIUM SERPL-MCNC: 4.6 MMOL/L — SIGNIFICANT CHANGE UP (ref 3.5–5.3)
POTASSIUM SERPL-SCNC: 4.6 MMOL/L — SIGNIFICANT CHANGE UP (ref 3.5–5.3)
PROT SERPL-MCNC: 7.5 G/DL — SIGNIFICANT CHANGE UP (ref 6.6–8.7)
RBC # BLD: 4.86 M/UL — SIGNIFICANT CHANGE UP (ref 4.2–5.8)
RBC # FLD: 13.1 % — SIGNIFICANT CHANGE UP (ref 10.3–14.5)
RBC BLD AUTO: NORMAL — SIGNIFICANT CHANGE UP
SODIUM SERPL-SCNC: 139 MMOL/L — SIGNIFICANT CHANGE UP (ref 135–145)
VARIANT LYMPHS # BLD: 5.2 % — SIGNIFICANT CHANGE UP (ref 0–6)
WBC # BLD: 5.49 K/UL — SIGNIFICANT CHANGE UP (ref 3.8–10.5)
WBC # FLD AUTO: 5.49 K/UL — SIGNIFICANT CHANGE UP (ref 3.8–10.5)

## 2022-03-11 PROCEDURE — 80053 COMPREHEN METABOLIC PANEL: CPT

## 2022-03-11 PROCEDURE — 83735 ASSAY OF MAGNESIUM: CPT

## 2022-03-11 PROCEDURE — 93005 ELECTROCARDIOGRAM TRACING: CPT

## 2022-03-11 PROCEDURE — 85025 COMPLETE CBC W/AUTO DIFF WBC: CPT

## 2022-03-11 PROCEDURE — 36415 COLL VENOUS BLD VENIPUNCTURE: CPT

## 2022-03-11 PROCEDURE — 99152 MOD SED SAME PHYS/QHP 5/>YRS: CPT

## 2022-03-11 PROCEDURE — 86850 RBC ANTIBODY SCREEN: CPT

## 2022-03-11 PROCEDURE — 99153 MOD SED SAME PHYS/QHP EA: CPT

## 2022-03-11 PROCEDURE — 86900 BLOOD TYPING SEROLOGIC ABO: CPT

## 2022-03-11 PROCEDURE — C1760: CPT

## 2022-03-11 PROCEDURE — 93459 L HRT ART/GRFT ANGIO: CPT

## 2022-03-11 PROCEDURE — 83036 HEMOGLOBIN GLYCOSYLATED A1C: CPT

## 2022-03-11 PROCEDURE — 93459 L HRT ART/GRFT ANGIO: CPT | Mod: 26

## 2022-03-11 PROCEDURE — 93010 ELECTROCARDIOGRAM REPORT: CPT

## 2022-03-11 PROCEDURE — C1769: CPT

## 2022-03-11 PROCEDURE — 86901 BLOOD TYPING SEROLOGIC RH(D): CPT

## 2022-03-11 PROCEDURE — C1894: CPT

## 2022-03-11 PROCEDURE — C1887: CPT

## 2022-03-11 RX ORDER — METFORMIN HYDROCHLORIDE 850 MG/1
0 TABLET ORAL
Qty: 0 | Refills: 1 | DISCHARGE

## 2022-03-11 RX ORDER — CLOPIDOGREL BISULFATE 75 MG/1
1 TABLET, FILM COATED ORAL
Qty: 90 | Refills: 3
Start: 2022-03-11 | End: 2023-03-05

## 2022-03-11 RX ORDER — CLOPIDOGREL BISULFATE 75 MG/1
75 TABLET, FILM COATED ORAL DAILY
Refills: 0 | Status: DISCONTINUED | OUTPATIENT
Start: 2022-03-11 | End: 2022-03-25

## 2022-03-11 NOTE — DISCHARGE NOTE PROVIDER - HOSPITAL COURSE
52 y/o M with PMH HLD, DM, CAD S/P CABG(LIMA-LAD, SVG-OM, SVG-Diag, SVG-RPDA 7/9/18) with c/o chest pain and NST with paty infarct ischemia. Presents for [facility] for further evaluation which revealed: SVG to Diag, circ, RCA patent.  LIMA to LAD atretic.  LAD with diffuse dz ().  Pt to return for staged procedure to  of LAD.

## 2022-03-11 NOTE — DISCHARGE NOTE PROVIDER - CARE PROVIDERS DIRECT ADDRESSES
[email or web address]

## 2022-03-11 NOTE — ASU PATIENT PROFILE, ADULT - FALL HARM RISK - UNIVERSAL INTERVENTIONS
Bed in lowest position, wheels locked, appropriate side rails in place/Call bell, personal items and telephone in reach/Instruct patient to call for assistance before getting out of bed or chair/Non-slip footwear when patient is out of bed/[place] to call system/Physically safe environment - no spills, clutter or unnecessary equipment/Purposeful Proactive Rounding/Room/bathroom lighting operational, light cord in reach

## 2022-03-11 NOTE — DISCHARGE NOTE PROVIDER - NSDCMRMEDTOKEN_GEN_ALL_CORE_FT
amLODIPine 5 mg oral tablet: 1 tab(s) orally once a day  aspirin 81 mg oral tablet, chewable: 1 tab(s) orally once a day  atenolol 25 mg oral tablet: 1 tab(s) orally once a day  Basaglar KwikPen 100 units/mL subcutaneous solution: 18 unit(s) subcutaneous once a day  clopidogrel 75 mg oral tablet: 1 tab(s) orally once a day  folic acid 0.8 mg oral tablet: 1 tab(s) orally once a day  losartan 50 mg oral tablet: 1 tab(s) orally once a day  metFORMIN 750 mg oral tablet, extended release: hold for 48 hours  pravastatin 40 mg oral tablet: 1 tab(s) orally once a day

## 2022-03-11 NOTE — DISCHARGE NOTE PROVIDER - NSDCCPCAREPLAN_GEN_ALL_CORE_FT
PRINCIPAL DISCHARGE DIAGNOSIS  Diagnosis: Coronary artery chronic total occlusion  Assessment and Plan of Treatment: LAD

## 2022-03-11 NOTE — PROGRESS NOTE ADULT - SUBJECTIVE AND OBJECTIVE BOX
Nurse Practitioner Progress note:     INTERVAL HISTORY: 50 y/o M with PMH HLD, DM, CAD S/P CABG(LIMA-LAD, SVG-OM, SVG-Diag, SVG-RPDA 7/9/18) with c/o chest pain and NST with paty infarct ischemia. Presents for [facility] for further evaluation        TELEMETRY: SB    T(C): 36.8 (03-11-22 @ 10:15), Max: 36.8 (03-11-22 @ 10:15)  HR: 53 (03-11-22 @ 11:55) (49 - 53)  BP: 124/62 (03-11-22 @ 11:55) (124/62 - 125/78)  RR: 16 (03-11-22 @ 11:55) (16 - 16)  SpO2: 100% (03-11-22 @ 11:55) (100% - 100%)  Wt(kg): --    PHYSICAL EXAM:  Appearance: Normal	  Cardiovascular: Normal S1 S2, No JVD, No murmurs, No edema  Respiratory: Lungs clear to auscultation	  Psychiatry: A & O x 3, Mood & affect appropriate  Gastrointestinal:  Soft, Non-tender, + BS	  Skin: No rashes, No ecchymoses, No cyanosis  Neurologic: Non-focal, A&O X3.  No neuro deficits  Extremities: Normal range of motion, No clubbing, cyanosis or edema  Vascular: Peripheral pulses palpable 2+ bilaterally  Procedure Site: Right groin with mynx closure device benign.  No bleeding/hematoma/ecchymosis.  + palp pedal pulse.     	    MEDICATIONS DURING PROCEDURE:  versed 1 mg  fentanyl 50 mcg  omnipaque 76 ml    PROCEDURE RESULTS: S/P LHC which revealed: SVG to Diag, circ, RCA patent.  LIMA to LAD atretic.  LAD with diffuse dz ().  Pt to return for staged procedure to  of LAD.     ASSESSMENT/PLAN: 	  -Groin precautions  -Bedrest X 2h  -Resume home meds  -Initiate plavix  -Follow up with Dr. Fink for Staged procedure  -F/u Dr. Majano  -Discharge to home when criteria met  -Check labs/EKG/site check in AM  -Probable discharge in AM

## 2022-03-11 NOTE — DISCHARGE NOTE NURSING/CASE MANAGEMENT/SOCIAL WORK - PATIENT PORTAL LINK FT
You can access the FollowMyHealth Patient Portal offered by [facility] by registering at the following website: [email or web address]. By joining [facility]’s FollowMyHealth portal, you will also be able to view your health information using other applications (apps) compatible with our system.

## 2022-03-11 NOTE — DISCHARGE NOTE NURSING/CASE MANAGEMENT/SOCIAL WORK - NSDCPEFALRISK_GEN_ALL_CORE
For information on Fall & Injury Prevention, visit: [email or web address] OR  [email or web address] OR  https://www.cdc.gov/steadi/patient.html

## 2022-03-11 NOTE — DISCHARGE NOTE PROVIDER - CARE PROVIDER_API CALL
Manny Martinez)  Cardiovascular Disease; Internal Medicine  [address] [facility], [address] [place] [address]  Phone: [phone]  Fax: [phone]  Follow Up Time:     Stefano Fink)  Cardiovascular Disease; Interventional Cardiology  [address] [facility], [place] [address]  Phone: [phone]  Fax: [phone]  Follow Up Time:

## 2022-03-16 DIAGNOSIS — I25.118 ATHEROSCLEROTIC HEART DISEASE OF NATIVE CORONARY ARTERY WITH OTHER FORMS OF ANGINA PECTORIS: ICD-10-CM

## 2022-03-23 PROBLEM — I25.10 ATHEROSCLEROTIC HEART DISEASE OF NATIVE CORONARY ARTERY WITHOUT ANGINA PECTORIS: Chronic | Status: ACTIVE | Noted: 2022-03-11

## 2022-03-23 PROBLEM — Z86.16 PERSONAL HISTORY OF COVID-19: Chronic | Status: ACTIVE | Noted: 2022-03-11

## 2022-04-05 VITALS
HEART RATE: 60 BPM | WEIGHT: 130.07 LBS | RESPIRATION RATE: 21 BRPM | DIASTOLIC BLOOD PRESSURE: 81 MMHG | SYSTOLIC BLOOD PRESSURE: 155 MMHG | HEIGHT: 62 IN | OXYGEN SATURATION: 99 %

## 2022-04-05 RX ORDER — CHLORHEXIDINE GLUCONATE 213 G/1000ML
1 SOLUTION TOPICAL ONCE
Refills: 0 | Status: DISCONTINUED | OUTPATIENT
Start: 2022-04-06 | End: 2022-04-07

## 2022-04-05 NOTE — H&P PST ADULT - NSICDXPASTMEDICALHX_GEN_ALL_CORE_FT
PAST MEDICAL HISTORY:  CAD (coronary artery disease)     History of 2019 novel coronavirus disease (COVID-19) 12/21    HLD (hyperlipidemia)     HTN (hypertension)     IDDM (insulin dependent diabetes mellitus)

## 2022-04-05 NOTE — H&P PST ADULT - NEGATIVE NEUROLOGICAL SYMPTOMS
no transient paralysis/no weakness/no paresthesias/no generalized seizures/no focal seizures/no tremors/no difficulty walking/no headache

## 2022-04-05 NOTE — H&P PST ADULT - HISTORY OF PRESENT ILLNESS
50 y/o M with PMH HLD, DM, CAD S/P CABG(LIMA-LAD, SVG-OM, SVG-Diag, SVG-RPDA 7/9/18), COVID 12/21 with c/o chest pain and NST with paty infarct ischemia. Recent LHC revealed: SVG to Diag, circ, RCA patent.  LIMA to LAD atretic.  LAD with diffuse dz ().  Pt to return for staged procedure to  of LAD.     Symptoms  Angina Class II  Ischemic Symptoms CABALLERO    Heart Failure  Systolic/Diastolic/Combined: NA  NYHA Class (within 2 weeks):     Assessment of LVEF (Must be within 6 months):       EF: 74%       Assessed by: NST       Date: 12/31/21    Prior Cardiac Interventions (LHC, stents, CABG):       CABG (Date, Grafts): CABG(LIMA-LAD, SVG-OM, SVG-Diag, SVG-RPDA 7/9/18)  < from: Cardiac Catheterization (03.11.22 @ 10:56) >  VENTRICLES: No LV gram was performed; however, a recent echocardiogram  demonstrated an EF of 55 %.  CORONARY VESSELS: The coronary circulation is right dominant.  LM:   --  Ostial LM: There was a 20 % stenosis.  --  Distal left main: There was a 50 % stenosis.  LAD:   --  Proximal LAD: There was a diffuse 90 % stenosis.  --  Mid LAD: There was a diffuse 95 % stenosis.  --  Distal LAD: There was a diffuse 70 % stenosis.  CX:   --  Circumflex: Diffuse 50-70% disease with distal occlusion. SVG to  OM patent.  RCA:   --  Proximal RCA: There was a diffuse 40 % stenosis.  --  Mid RCA: There was a diffuse 40 % stenosis.  --  RPDA: Diffusely diseased, small vessel. 80-90% lesions.  GRAFTS:   --  Graft to the distal LAD: The graft was a LIMA. Atretic.  --  Graft to the 1st diagonal: The graft was a saphenous vein y-graft.  --  Graft to the 1st obtuse marginal: The graft was a saphenous vein  y-graft.  --  Graft to the RPDA: The graft was a saphenous vein graft. Graft  angiography showed moderate atherosclerosis.  COMPLICATIONS: No complications occurred during the cath lab visit.  DIAGNOSTIC IMPRESSIONS: Patent SVG to OM/Diagonal, SVG to RCA.  Atretic LIMA to LAD.  Diffusely diseased LAD.  DIAGNOSTIC RECOMMENDATIONS: Due to high risk nature of LAD disease, plan  for staged PCI with rotational atherectomy.    < end of copied text >      Noninvasive Testing:   Stress Test: Date:        < from: Nuclear Stress Test-Exercise (Nuclear Stress Test-Exercise .) (12.23.21 @ 09:16) >    STRESS TEST IMPRESSIONS:  Exercise capacity: 14 METS, Excellent for age and gender.  78% of MPHR.  Chest Pain: No chest pain with exercise.  Symptom: dizziness.  HR Response: Appropriate.  BP Response: Appropriate.  Heart Rhythm: Normal Sinus Rhythm.  ECG Abnormalities: st depression.  ECG Changes: ST Depression: 1 mm horizontal in leads I  ,  V4, V5, V6.  Arrhythmia: None.    ------------------------------------------------------------------------    NUCLEAR FINDINGS:  The left ventricle was normal LV size. There are small,  moderate to severe defects in mid and apical inferior  walls that are predominantly reversible, suggestive of  infarction with moderate paty-infarct ischemia.  ------------------------------------------------------------------------      GATED ANALYSIS:   Mild hypokineis of mid and apical inferior wall. Post  Stress LVEF 74%.  ------------------------------------------------------------------------    IMPRESSIONS:  * Exercise capacity: 14 METS, Excellent for age and  gender. 78% of MPHR.  * Chest Pain: No chest pain with exercise.  * Symptom: dizziness.  * HR Response: Appropriate.  * BP Response: Appropriate.  * Heart Rhythm: Normal Sinus Rhythm.  * ECG Abnormalities: st depression.  * ECG Changes: ST Depression: 1 mm horizontal in leads I  , V4, V5, V6.  * Arrhythmia: None.  * The left ventricle was normal LV size. There are small,  moderate to severe defects in mid and apical inferior  walls that are predominantly reversible, suggestive of  infarction with moderate paty-infarct ischemia.  *  Mild hypokineis of mid and apical inferior wall. Post  Stress LVEF 74%.      Echo (Date, Findings):   PROCEDURE DATE:  Nov 17 2021     2D AND M-MODE MEASUREMENTS (normal ranges within parentheses):  Left                 Normal   Aorta/Left            Normal  Ventricle:                    Atrium:  IVIDs         2.55    PHYSICIAN INTERPRETATION:  Left Ventricle: The left ventricular internal cavity size is normal. Left ventricular wall thickness is severely increased.  Global LV systolic function was hyperdynamic. Left ventricular ejection fraction, by visual estimation, is >75%. Spectral Doppler shows impaired relaxation pattern of left ventricular myocardial filling (Grade I diastolic dysfunction).  Right Ventricle: The right ventricular size is normal. RV systolic function is normal.  Left Atrium: Normal left atrial size.  Right Atrium: Normal right atrial size.  Pericardium: There is no evidence of pericardial effusion.  Mitral Valve: The mitral valve is normal in structure. Mild mitral valve regurgitation is seen.  Tricuspid Valve: The tricuspid valve is normal in structure. Trivial tricuspid regurgitation is visualized.  Aortic Valve: The aortic valve is trileaflet. Trivial aortic valve regurgitation is seen.  Pulmonic Valve: Structurally normal pulmonic valve, with normal leaflet excursion. Trace pulmonic valve regurgitation.  Aorta: The aortic root is normal in size and structure.  Pulmonary Artery: The main pulmonary artery is normal in size.  Venous: The inferior vena cava was normal sized, with respiratory size variation greater than 50%.      Summary:   1. Left ventricular ejection fraction, by visual estimation, is >75%.   2. Hyperdynamic global left ventricular systolic function.   3. Severely increased LV wall thickness.   4. Spectral Doppler shows impaired relaxation pattern of left ventricular myocardial filling (Grade I diastolic dysfunction).   5. There is severe concentric left ventricular hypertrophy.   6. Mild mitral valve regurgitation.          Antianginal Therapies:        Beta Blockers:  yes       Calcium Channel Blockers: yes       Long Acting Nitrates:        Ranexa:     Associated Risk Factors:        Cerebrovascular Disease: N/A       Chronic Lung Disease: N/A       Peripheral Arterial Disease: N/A       Chronic Kidney Disease (if yes, what is GFR): N/A       Uncontrolled Diabetes (if yes, what is HgbA1C or FBS): N/A       Poorly Controlled Hypertension (if yes, what is SBP): N/A       Morbid Obesity (if yes, what is BMI): N/A       History of Recent Ventricular Arrhythmia: N/A       Inability to Ambulate Safely: N/A       Need for Therapeutic Anticoagulation: N/A       Antiplatelet or Contrast Allergy: N/A     52 y/o M with PMH HLD, DM, CAD S/P CABG(LIMA-LAD, SVG-OM, SVG-Diag, SVG-RPDA 7/9/18), COVID 12/21 with c/o chest pain and NST with paty infarct ischemia. Recent LHC revealed: SVG to Diag, circ, RCA patent.  LIMA to LAD atretic.  LAD with diffuse dz ().  Pt to return for staged procedure to  of LAD.     ASA 2  Mallampati 2  GFR  Creat  Bleeding Risk  COVID19 - Negative 4/2/22    Symptoms  Angina Class II  Ischemic Symptoms CABALLERO    Heart Failure  Systolic/Diastolic/Combined: NA  NYHA Class (within 2 weeks):     Assessment of LVEF (Must be within 6 months):       EF: 74%       Assessed by: NST       Date: 12/31/21    Prior Cardiac Interventions (LHC, stents, CABG):       CABG (Date, Grafts): CABG(LIMA-LAD, SVG-OM, SVG-Diag, SVG-RPDA 7/9/18)  < from: Cardiac Catheterization (03.11.22 @ 10:56) >  VENTRICLES: No LV gram was performed; however, a recent echocardiogram  demonstrated an EF of 55 %.  CORONARY VESSELS: The coronary circulation is right dominant.  LM:   --  Ostial LM: There was a 20 % stenosis.  --  Distal left main: There was a 50 % stenosis.  LAD:   --  Proximal LAD: There was a diffuse 90 % stenosis.  --  Mid LAD: There was a diffuse 95 % stenosis.  --  Distal LAD: There was a diffuse 70 % stenosis.  CX:   --  Circumflex: Diffuse 50-70% disease with distal occlusion. SVG to  OM patent.  RCA:   --  Proximal RCA: There was a diffuse 40 % stenosis.  --  Mid RCA: There was a diffuse 40 % stenosis.  --  RPDA: Diffusely diseased, small vessel. 80-90% lesions.  GRAFTS:   --  Graft to the distal LAD: The graft was a LIMA. Atretic.  --  Graft to the 1st diagonal: The graft was a saphenous vein y-graft.  --  Graft to the 1st obtuse marginal: The graft was a saphenous vein  y-graft.  --  Graft to the RPDA: The graft was a saphenous vein graft. Graft  angiography showed moderate atherosclerosis.  COMPLICATIONS: No complications occurred during the cath lab visit.  DIAGNOSTIC IMPRESSIONS: Patent SVG to OM/Diagonal, SVG to RCA.  Atretic LIMA to LAD.  Diffusely diseased LAD.  DIAGNOSTIC RECOMMENDATIONS: Due to high risk nature of LAD disease, plan  for staged PCI with rotational atherectomy.    < end of copied text >      Noninvasive Testing:   Stress Test: Date:        < from: Nuclear Stress Test-Exercise (Nuclear Stress Test-Exercise .) (12.23.21 @ 09:16) >    STRESS TEST IMPRESSIONS:  Exercise capacity: 14 METS, Excellent for age and gender.  78% of MPHR.  Chest Pain: No chest pain with exercise.  Symptom: dizziness.  HR Response: Appropriate.  BP Response: Appropriate.  Heart Rhythm: Normal Sinus Rhythm.  ECG Abnormalities: st depression.  ECG Changes: ST Depression: 1 mm horizontal in leads I  ,  V4, V5, V6.  Arrhythmia: None.    ------------------------------------------------------------------------    NUCLEAR FINDINGS:  The left ventricle was normal LV size. There are small,  moderate to severe defects in mid and apical inferior  walls that are predominantly reversible, suggestive of  infarction with moderate paty-infarct ischemia.  ------------------------------------------------------------------------      GATED ANALYSIS:   Mild hypokineis of mid and apical inferior wall. Post  Stress LVEF 74%.  ------------------------------------------------------------------------    IMPRESSIONS:  * Exercise capacity: 14 METS, Excellent for age and  gender. 78% of MPHR.  * Chest Pain: No chest pain with exercise.  * Symptom: dizziness.  * HR Response: Appropriate.  * BP Response: Appropriate.  * Heart Rhythm: Normal Sinus Rhythm.  * ECG Abnormalities: st depression.  * ECG Changes: ST Depression: 1 mm horizontal in leads I  , V4, V5, V6.  * Arrhythmia: None.  * The left ventricle was normal LV size. There are small,  moderate to severe defects in mid and apical inferior  walls that are predominantly reversible, suggestive of  infarction with moderate paty-infarct ischemia.  *  Mild hypokineis of mid and apical inferior wall. Post  Stress LVEF 74%.      Echo (Date, Findings):   PROCEDURE DATE:  Nov 17 2021     2D AND M-MODE MEASUREMENTS (normal ranges within parentheses):  Left                 Normal   Aorta/Left            Normal  Ventricle:                    Atrium:  IVIDs         2.55    PHYSICIAN INTERPRETATION:  Left Ventricle: The left ventricular internal cavity size is normal. Left ventricular wall thickness is severely increased.  Global LV systolic function was hyperdynamic. Left ventricular ejection fraction, by visual estimation, is >75%. Spectral Doppler shows impaired relaxation pattern of left ventricular myocardial filling (Grade I diastolic dysfunction).  Right Ventricle: The right ventricular size is normal. RV systolic function is normal.  Left Atrium: Normal left atrial size.  Right Atrium: Normal right atrial size.  Pericardium: There is no evidence of pericardial effusion.  Mitral Valve: The mitral valve is normal in structure. Mild mitral valve regurgitation is seen.  Tricuspid Valve: The tricuspid valve is normal in structure. Trivial tricuspid regurgitation is visualized.  Aortic Valve: The aortic valve is trileaflet. Trivial aortic valve regurgitation is seen.  Pulmonic Valve: Structurally normal pulmonic valve, with normal leaflet excursion. Trace pulmonic valve regurgitation.  Aorta: The aortic root is normal in size and structure.  Pulmonary Artery: The main pulmonary artery is normal in size.  Venous: The inferior vena cava was normal sized, with respiratory size variation greater than 50%.      Summary:   1. Left ventricular ejection fraction, by visual estimation, is >75%.   2. Hyperdynamic global left ventricular systolic function.   3. Severely increased LV wall thickness.   4. Spectral Doppler shows impaired relaxation pattern of left ventricular myocardial filling (Grade I diastolic dysfunction).   5. There is severe concentric left ventricular hypertrophy.   6. Mild mitral valve regurgitation.          Antianginal Therapies:        Beta Blockers:  yes       Calcium Channel Blockers: yes       Long Acting Nitrates:        Ranexa:     Associated Risk Factors:        Cerebrovascular Disease: N/A       Chronic Lung Disease: N/A       Peripheral Arterial Disease: N/A       Chronic Kidney Disease (if yes, what is GFR): N/A       Uncontrolled Diabetes (if yes, what is HgbA1C or FBS): N/A       Poorly Controlled Hypertension (if yes, what is SBP): N/A       Morbid Obesity (if yes, what is BMI): N/A       History of Recent Ventricular Arrhythmia: N/A       Inability to Ambulate Safely: N/A       Need for Therapeutic Anticoagulation: N/A       Antiplatelet or Contrast Allergy: N/A

## 2022-04-05 NOTE — H&P PST ADULT - ASSESSMENT
50 y/o M with PMH HLD, DM, CAD S/P CABG(LIMA-LAD, SVG-OM, SVG-Diag, SVG-RPDA 7/9/18), COVID 12/21 with c/o chest pain and NST with paty infarct ischemia. Recent LHC revealed: SVG to Diag, circ, RCA patent.  LIMA to LAD atretic.  LAD with diffuse dz ().  Pt  for staged procedure to  of LAD.     ASA  Mallampati  GFR  BRA     50 y/o M with PMH HLD, DM, CAD S/P CABG(LIMA-LAD, SVG-OM, SVG-Diag, SVG-RPDA 7/9/18), COVID 12/21 with c/o chest pain and NST with paty infarct ischemia. Recent LHC revealed: SVG to Diag, circ, RCA patent.  LIMA to LAD atretic.  LAD with diffuse dz ().  Pt  for staged procedure to  of LAD.      Plan: PRE-PROCEDURE ASSESSMENT    -Patient seen and examined  PRE-PROCEDURE ASSESSMENT  -NPO after midnight confirmed  -IV insert  -Patient seen and examined  -Labs reviewed  -Pre-procedure teaching completed with patient   -consent obtained   -Questions answered

## 2022-04-06 ENCOUNTER — INPATIENT (INPATIENT)
Facility: HOSPITAL | Age: 52
LOS: 0 days | Discharge: HOME | DRG: 247 | End: 2022-04-07
Attending: INTERNAL MEDICINE | Admitting: INTERNAL MEDICINE
Payer: COMMERCIAL

## 2022-04-06 DIAGNOSIS — I25.10 ATHEROSCLEROTIC HEART DISEASE OF NATIVE CORONARY ARTERY WITHOUT ANGINA PECTORIS: ICD-10-CM

## 2022-04-06 DIAGNOSIS — Z95.1 PRESENCE OF AORTOCORONARY BYPASS GRAFT: Chronic | ICD-10-CM

## 2022-04-06 LAB
ALBUMIN SERPL ELPH-MCNC: 4.9 G/DL — SIGNIFICANT CHANGE UP (ref 3.3–5.2)
ALP SERPL-CCNC: 50 U/L — SIGNIFICANT CHANGE UP (ref 40–120)
ALT FLD-CCNC: 17 U/L — SIGNIFICANT CHANGE UP
ANION GAP SERPL CALC-SCNC: 13 MMOL/L — SIGNIFICANT CHANGE UP (ref 5–17)
AST SERPL-CCNC: 23 U/L — SIGNIFICANT CHANGE UP
BILIRUB SERPL-MCNC: 0.4 MG/DL — SIGNIFICANT CHANGE UP (ref 0.4–2)
BLD GP AB SCN SERPL QL: SIGNIFICANT CHANGE UP
BUN SERPL-MCNC: 9.8 MG/DL — SIGNIFICANT CHANGE UP (ref 8–20)
CALCIUM SERPL-MCNC: 9.8 MG/DL — SIGNIFICANT CHANGE UP (ref 8.6–10.2)
CHLORIDE SERPL-SCNC: 103 MMOL/L — SIGNIFICANT CHANGE UP (ref 98–107)
CO2 SERPL-SCNC: 24 MMOL/L — SIGNIFICANT CHANGE UP (ref 22–29)
CREAT SERPL-MCNC: 0.95 MG/DL — SIGNIFICANT CHANGE UP (ref 0.5–1.3)
EGFR: 96 ML/MIN/1.73M2 — SIGNIFICANT CHANGE UP
GLUCOSE BLDC GLUCOMTR-MCNC: 144 MG/DL — HIGH (ref 70–99)
GLUCOSE SERPL-MCNC: 116 MG/DL — HIGH (ref 70–99)
HCT VFR BLD CALC: 42.1 % — SIGNIFICANT CHANGE UP (ref 39–50)
HGB BLD-MCNC: 14.1 G/DL — SIGNIFICANT CHANGE UP (ref 13–17)
MAGNESIUM SERPL-MCNC: 2 MG/DL — SIGNIFICANT CHANGE UP (ref 1.6–2.6)
MCHC RBC-ENTMCNC: 28.8 PG — SIGNIFICANT CHANGE UP (ref 27–34)
MCHC RBC-ENTMCNC: 33.5 GM/DL — SIGNIFICANT CHANGE UP (ref 32–36)
MCV RBC AUTO: 85.9 FL — SIGNIFICANT CHANGE UP (ref 80–100)
PLATELET # BLD AUTO: 155 K/UL — SIGNIFICANT CHANGE UP (ref 150–400)
POTASSIUM SERPL-MCNC: 4.2 MMOL/L — SIGNIFICANT CHANGE UP (ref 3.5–5.3)
POTASSIUM SERPL-SCNC: 4.2 MMOL/L — SIGNIFICANT CHANGE UP (ref 3.5–5.3)
PROT SERPL-MCNC: 7.8 G/DL — SIGNIFICANT CHANGE UP (ref 6.6–8.7)
RBC # BLD: 4.9 M/UL — SIGNIFICANT CHANGE UP (ref 4.2–5.8)
RBC # FLD: 13.1 % — SIGNIFICANT CHANGE UP (ref 10.3–14.5)
SODIUM SERPL-SCNC: 140 MMOL/L — SIGNIFICANT CHANGE UP (ref 135–145)
WBC # BLD: 4.69 K/UL — SIGNIFICANT CHANGE UP (ref 3.8–10.5)
WBC # FLD AUTO: 4.69 K/UL — SIGNIFICANT CHANGE UP (ref 3.8–10.5)

## 2022-04-06 PROCEDURE — 93010 ELECTROCARDIOGRAM REPORT: CPT

## 2022-04-06 PROCEDURE — 99223 1ST HOSP IP/OBS HIGH 75: CPT | Mod: 25

## 2022-04-06 RX ORDER — FAMOTIDINE 10 MG/ML
40 INJECTION INTRAVENOUS
Refills: 0 | Status: DISCONTINUED | OUTPATIENT
Start: 2022-04-06 | End: 2022-04-07

## 2022-04-06 RX ORDER — DEXTROSE 50 % IN WATER 50 %
25 SYRINGE (ML) INTRAVENOUS ONCE
Refills: 0 | Status: DISCONTINUED | OUTPATIENT
Start: 2022-04-06 | End: 2022-04-07

## 2022-04-06 RX ORDER — ASPIRIN/CALCIUM CARB/MAGNESIUM 324 MG
1 TABLET ORAL
Qty: 0 | Refills: 0 | DISCHARGE

## 2022-04-06 RX ORDER — ATENOLOL 25 MG/1
1 TABLET ORAL
Qty: 0 | Refills: 1 | DISCHARGE

## 2022-04-06 RX ORDER — ASPIRIN/CALCIUM CARB/MAGNESIUM 324 MG
81 TABLET ORAL DAILY
Refills: 0 | Status: DISCONTINUED | OUTPATIENT
Start: 2022-04-06 | End: 2022-04-07

## 2022-04-06 RX ORDER — AMLODIPINE BESYLATE 2.5 MG/1
5 TABLET ORAL DAILY
Refills: 0 | Status: DISCONTINUED | OUTPATIENT
Start: 2022-04-06 | End: 2022-04-07

## 2022-04-06 RX ORDER — DEXTROSE 50 % IN WATER 50 %
12.5 SYRINGE (ML) INTRAVENOUS ONCE
Refills: 0 | Status: DISCONTINUED | OUTPATIENT
Start: 2022-04-06 | End: 2022-04-07

## 2022-04-06 RX ORDER — CLOPIDOGREL BISULFATE 75 MG/1
75 TABLET, FILM COATED ORAL DAILY
Refills: 0 | Status: DISCONTINUED | OUTPATIENT
Start: 2022-04-06 | End: 2022-04-07

## 2022-04-06 RX ORDER — NITROGLYCERIN 6.5 MG
0.4 CAPSULE, EXTENDED RELEASE ORAL ONCE
Refills: 0 | Status: COMPLETED | OUTPATIENT
Start: 2022-04-06 | End: 2022-04-06

## 2022-04-06 RX ORDER — DEXTROSE 50 % IN WATER 50 %
15 SYRINGE (ML) INTRAVENOUS ONCE
Refills: 0 | Status: DISCONTINUED | OUTPATIENT
Start: 2022-04-06 | End: 2022-04-07

## 2022-04-06 RX ORDER — GLUCAGON INJECTION, SOLUTION 0.5 MG/.1ML
1 INJECTION, SOLUTION SUBCUTANEOUS ONCE
Refills: 0 | Status: DISCONTINUED | OUTPATIENT
Start: 2022-04-06 | End: 2022-04-07

## 2022-04-06 RX ORDER — SODIUM CHLORIDE 9 MG/ML
1000 INJECTION, SOLUTION INTRAVENOUS
Refills: 0 | Status: DISCONTINUED | OUTPATIENT
Start: 2022-04-06 | End: 2022-04-07

## 2022-04-06 RX ORDER — FERROUS SULFATE 325(65) MG
0 TABLET ORAL
Qty: 0 | Refills: 0 | DISCHARGE

## 2022-04-06 RX ORDER — SODIUM CHLORIDE 9 MG/ML
250 INJECTION INTRAMUSCULAR; INTRAVENOUS; SUBCUTANEOUS ONCE
Refills: 0 | Status: DISCONTINUED | OUTPATIENT
Start: 2022-04-06 | End: 2022-04-07

## 2022-04-06 RX ORDER — INSULIN LISPRO 100/ML
VIAL (ML) SUBCUTANEOUS AT BEDTIME
Refills: 0 | Status: DISCONTINUED | OUTPATIENT
Start: 2022-04-06 | End: 2022-04-07

## 2022-04-06 RX ORDER — LOSARTAN POTASSIUM 100 MG/1
50 TABLET, FILM COATED ORAL DAILY
Refills: 0 | Status: DISCONTINUED | OUTPATIENT
Start: 2022-04-06 | End: 2022-04-07

## 2022-04-06 RX ORDER — INSULIN LISPRO 100/ML
VIAL (ML) SUBCUTANEOUS
Refills: 0 | Status: DISCONTINUED | OUTPATIENT
Start: 2022-04-06 | End: 2022-04-07

## 2022-04-06 RX ORDER — ATENOLOL 25 MG/1
25 TABLET ORAL DAILY
Refills: 0 | Status: DISCONTINUED | OUTPATIENT
Start: 2022-04-06 | End: 2022-04-07

## 2022-04-06 RX ORDER — METFORMIN HYDROCHLORIDE 850 MG/1
1 TABLET ORAL
Qty: 0 | Refills: 0 | DISCHARGE

## 2022-04-06 RX ORDER — ATORVASTATIN CALCIUM 80 MG/1
10 TABLET, FILM COATED ORAL AT BEDTIME
Refills: 0 | Status: DISCONTINUED | OUTPATIENT
Start: 2022-04-06 | End: 2022-04-07

## 2022-04-06 RX ADMIN — Medication 81 MILLIGRAM(S): at 12:31

## 2022-04-06 RX ADMIN — Medication 30 MILLILITER(S): at 16:50

## 2022-04-06 RX ADMIN — Medication 0.4 MILLIGRAM(S): at 16:50

## 2022-04-06 RX ADMIN — FAMOTIDINE 40 MILLIGRAM(S): 10 INJECTION INTRAVENOUS at 20:12

## 2022-04-06 RX ADMIN — ATORVASTATIN CALCIUM 10 MILLIGRAM(S): 80 TABLET, FILM COATED ORAL at 21:23

## 2022-04-06 RX ADMIN — AMLODIPINE BESYLATE 5 MILLIGRAM(S): 2.5 TABLET ORAL at 21:23

## 2022-04-06 RX ADMIN — CLOPIDOGREL BISULFATE 75 MILLIGRAM(S): 75 TABLET, FILM COATED ORAL at 12:32

## 2022-04-06 NOTE — PROGRESS NOTE ADULT - SUBJECTIVE AND OBJECTIVE BOX
Nurse Practitioner Progress note:   s/p [facility] with successful PCI and JOSE to to the prox and mid LAD with 2 JOSE  Patient loaded with Plavix 300 mg po intra procedurally        Patient feels well.  Denied chest pain initially when received from lab then after approx. 20 min he complained of 10/10 burning in the throat. No shortness of breath, dizziness or palpitations at this time. EKG post procedure and when c/o cP where unchanged from baseline pre procedure EKG of Sinus Rhythm with T wave inversions in V2-6 and Iead I.      Patient A&O x 3  Lungs CTA  S1S2 no MRG  Right groin procedure site with angioseal to the RAA, and the #6Fr RV sheath removed with manual pressure upon arrival to holding room. Site CDI.  No bleeding, no hematoma, site soft, non tender, positive pedal pulses bilaterally.        T(C): 36.7 (04-06-22 @ 12:39), Max: 36.7 (04-06-22 @ 12:39)  HR: 54 (04-06-22 @ 16:30) (49 - 60)  BP: 130/74 (04-06-22 @ 16:30) (125/70 - 155/81)  RR: 12 (04-06-22 @ 16:30) (12 - 21)  SpO2: 98% (04-06-22 @ 16:30) (98% - 100%)    CBC Full  -  ( 06 Apr 2022 12:19 )  WBC Count : 4.69 K/uL  RBC Count : 4.90 M/uL  Hemoglobin : 14.1 g/dL  Hematocrit : 42.1 %  Platelet Count - Automated : 155 K/uL  Mean Cell Volume : 85.9 fl  Mean Cell Hemoglobin : 28.8 pg  Mean Cell Hemoglobin Concentration : 33.5 gm/dL      04-06    140  |  103  |  9.8  ----------------------------<  116<H>  4.2   |  24.0  |  0.95    Ca    9.8      06 Apr 2022 12:19  Mg     2.0     04-06    TPro  7.8  /  Alb  4.9  /  TBili  0.4  /  DBili  x   /  AST  23  /  ALT  17  /  AlkPhos  50  04-06      MEDICATIONS  (STANDING):  amLODIPine   Tablet 5 milliGRAM(s) Oral daily  aspirin  chewable 81 milliGRAM(s) Oral daily  ATENolol  Tablet 25 milliGRAM(s) Oral daily  atorvastatin 10 milliGRAM(s) Oral at bedtime  chlorhexidine 4% Liquid 1 Application(s) Topical Once  clopidogrel Tablet 75 milliGRAM(s) Oral daily  dextrose 5%. 1000 milliLiter(s) (50 mL/Hr) IV Continuous <Continuous>  dextrose 5%. 1000 milliLiter(s) (100 mL/Hr) IV Continuous <Continuous>  dextrose 50% Injectable 25 Gram(s) IV Push once  dextrose 50% Injectable 12.5 Gram(s) IV Push once  dextrose 50% Injectable 25 Gram(s) IV Push once  glucagon  Injectable 1 milliGRAM(s) IntraMuscular once  insulin lispro (ADMELOG) corrective regimen sliding scale   SubCutaneous three times a day before meals  insulin lispro (ADMELOG) corrective regimen sliding scale   SubCutaneous at bedtime  losartan 50 milliGRAM(s) Oral daily  nitroglycerin     SubLingual 0.4 milliGRAM(s) SubLingual once    MEDICATIONS  (PRN):  aluminum hydroxide/magnesium hydroxide/simethicone Suspension 30 milliLiter(s) Oral every 4 hours PRN Dyspepsia  dextrose Oral Gel 15 Gram(s) Oral once PRN Blood Glucose LESS THAN 70 milliGRAM(s)/deciliter        HPI:  50 y/o M with PMH HLD, DM, CAD S/P CABG(LIMA-LAD, SVG-OM, SVG-Diag, SVG-RPDA 7/9/18), COVID 12/21 with c/o chest pain and NST with paty infarct ischemia. Recent LHC revealed: SVG to Diag, circ, RCA patent.  LIMA to LAD atretic.  LAD with diffuse dz ().  Pt to return for staged procedure to  of LAD.    (05 Apr 2022 18:50)    now s/p LHC with succesful PCI and JOSE x 2 to the prox and mid LAD    ASSESSMENT/PLAN:    Coronary artery disease  -Admit to telemetry overnight secondary to high risk LAD intervention  -VS, labs, diet, activity as per PCI orders  -IV hydration  -Encourage PO fluids  -Aspirin 81 mg PO daily  -Plavix 75mg PO daily  -Atenolol 25 mg PO daily  -Losartan 50 mg PO daily  -atorvastatin 40mg PO QHS   -Plan of care discussed with patient, family and MD  -likely d/c in AM if patient remains stable overnight  -NP to see in AM to evaluate labs, EKG and procedure site check  -Cardiac rehab info provided/referral and communication to cardiac rehab completed  -Follow-up with attending Dr Fink  within 1 week  -Discussed therapeutic lifestyle changes to reduce risk factors such as following a cardiac diet, weight loss, maintaining a healthy weight, exercise, smoking cessation, medication compliance, and regular follow-up  with MD to know your numbers (BP, cholesterol, weight, and glucose) Nurse Practitioner Progress note:   s/p [facility] with successful PCI and JOSE to to the prox and mid LAD with 2 JOSE  Patient loaded with Plavix 300 mg po intra procedurally        Patient feels well.  Denied chest pain initially when received from lab then after approx. 20 min he complained of 10/10 burning in the throat. No shortness of breath, dizziness or palpitations at this time. EKG post procedure and when c/o cP where unchanged from baseline pre procedure EKG of Sinus Rhythm with T wave inversions in V2-6 and Iead I.      Patient A&O x 3  Lungs CTA  S1S2 no MRG  Right groin procedure site with angioseal to the RAA, and the #6Fr RV sheath removed with manual pressure upon arrival to holding room. Site CDI.  No bleeding, no hematoma, site soft, non tender, positive pedal pulses bilaterally.        T(C): 36.7 (04-06-22 @ 12:39), Max: 36.7 (04-06-22 @ 12:39)  HR: 54 (04-06-22 @ 16:30) (49 - 60)  BP: 130/74 (04-06-22 @ 16:30) (125/70 - 155/81)  RR: 12 (04-06-22 @ 16:30) (12 - 21)  SpO2: 98% (04-06-22 @ 16:30) (98% - 100%)    CBC Full  -  ( 06 Apr 2022 12:19 )  WBC Count : 4.69 K/uL  RBC Count : 4.90 M/uL  Hemoglobin : 14.1 g/dL  Hematocrit : 42.1 %  Platelet Count - Automated : 155 K/uL  Mean Cell Volume : 85.9 fl  Mean Cell Hemoglobin : 28.8 pg  Mean Cell Hemoglobin Concentration : 33.5 gm/dL      04-06    140  |  103  |  9.8  ----------------------------<  116<H>  4.2   |  24.0  |  0.95    Ca    9.8      06 Apr 2022 12:19  Mg     2.0     04-06    TPro  7.8  /  Alb  4.9  /  TBili  0.4  /  DBili  x   /  AST  23  /  ALT  17  /  AlkPhos  50  04-06      MEDICATIONS  (STANDING):  amLODIPine   Tablet 5 milliGRAM(s) Oral daily  aspirin  chewable 81 milliGRAM(s) Oral daily  ATENolol  Tablet 25 milliGRAM(s) Oral daily  atorvastatin 10 milliGRAM(s) Oral at bedtime  chlorhexidine 4% Liquid 1 Application(s) Topical Once  clopidogrel Tablet 75 milliGRAM(s) Oral daily  dextrose 5%. 1000 milliLiter(s) (50 mL/Hr) IV Continuous <Continuous>  dextrose 5%. 1000 milliLiter(s) (100 mL/Hr) IV Continuous <Continuous>  dextrose 50% Injectable 25 Gram(s) IV Push once  dextrose 50% Injectable 12.5 Gram(s) IV Push once  dextrose 50% Injectable 25 Gram(s) IV Push once  glucagon  Injectable 1 milliGRAM(s) IntraMuscular once  insulin lispro (ADMELOG) corrective regimen sliding scale   SubCutaneous three times a day before meals  insulin lispro (ADMELOG) corrective regimen sliding scale   SubCutaneous at bedtime  losartan 50 milliGRAM(s) Oral daily  nitroglycerin     SubLingual 0.4 milliGRAM(s) SubLingual once    MEDICATIONS  (PRN):  aluminum hydroxide/magnesium hydroxide/simethicone Suspension 30 milliLiter(s) Oral every 4 hours PRN Dyspepsia  dextrose Oral Gel 15 Gram(s) Oral once PRN Blood Glucose LESS THAN 70 milliGRAM(s)/deciliter        HPI:  50 y/o M with PMH HLD, DM, CAD S/P CABG(LIMA-LAD, SVG-OM, SVG-Diag, SVG-RPDA 7/9/18), COVID 12/21 with c/o chest pain and NST with paty infarct ischemia. Recent LHC revealed: SVG to Diag, circ, RCA patent.  LIMA to LAD atretic.  LAD with diffuse dz ().  Pt to return for staged procedure to  of LAD.    (05 Apr 2022 18:50)    now s/p LHC with succesful PCI and JOSE x 2 to the prox and mid LAD    ASSESSMENT/PLAN:    Coronary artery disease  -Admit to telemetry overnight secondary to high risk LAD intervention  -VS, labs, diet, activity as per PCI orders  -IV hydration  -Encourage PO fluids  -Aspirin 81 mg PO daily  -Plavix 75mg PO daily  -Atenolol 25 mg PO daily  -Losartan 50 mg PO daily  -atorvastatin 40mg PO QHS   -Plan of care discussed with patient, family and MD  -likely d/c in AM if patient remains stable overnight  -NP to see in AM to evaluate labs, EKG and procedure site check  -Cardiac rehab info provided/referral and communication to cardiac rehab completed  -Follow-up with attending Dr Fink  within 1 week  -Discussed therapeutic lifestyle changes to reduce risk factors such as following a cardiac diet, weight loss, maintaining a healthy weight, exercise, smoking cessation, medication compliance, and regular follow-up  with MD to know your numbers (BP, cholesterol, weight, and glucose)    Chest Burning  -Stat EKG  -Maalox 30 cc po PRN Q4H  -Stat SLNTG  -Pepcid 40 mg po BID  -Pain reduced to 5/10 p 10 minutes

## 2022-04-07 ENCOUNTER — TRANSCRIPTION ENCOUNTER (OUTPATIENT)
Age: 52
End: 2022-04-07

## 2022-04-07 VITALS
OXYGEN SATURATION: 98 % | HEART RATE: 53 BPM | SYSTOLIC BLOOD PRESSURE: 132 MMHG | DIASTOLIC BLOOD PRESSURE: 72 MMHG | RESPIRATION RATE: 16 BRPM

## 2022-04-07 LAB
ANION GAP SERPL CALC-SCNC: 12 MMOL/L — SIGNIFICANT CHANGE UP (ref 5–17)
BASOPHILS # BLD AUTO: 0.01 K/UL — SIGNIFICANT CHANGE UP (ref 0–0.2)
BASOPHILS NFR BLD AUTO: 0.2 % — SIGNIFICANT CHANGE UP (ref 0–2)
BUN SERPL-MCNC: 9.7 MG/DL — SIGNIFICANT CHANGE UP (ref 8–20)
CALCIUM SERPL-MCNC: 9.1 MG/DL — SIGNIFICANT CHANGE UP (ref 8.6–10.2)
CHLORIDE SERPL-SCNC: 102 MMOL/L — SIGNIFICANT CHANGE UP (ref 98–107)
CHOLEST SERPL-MCNC: 142 MG/DL — SIGNIFICANT CHANGE UP
CO2 SERPL-SCNC: 25 MMOL/L — SIGNIFICANT CHANGE UP (ref 22–29)
CREAT SERPL-MCNC: 0.89 MG/DL — SIGNIFICANT CHANGE UP (ref 0.5–1.3)
EGFR: 102 ML/MIN/1.73M2 — SIGNIFICANT CHANGE UP
EOSINOPHIL # BLD AUTO: 0.14 K/UL — SIGNIFICANT CHANGE UP (ref 0–0.5)
EOSINOPHIL NFR BLD AUTO: 2.4 % — SIGNIFICANT CHANGE UP (ref 0–6)
GLUCOSE SERPL-MCNC: 130 MG/DL — HIGH (ref 70–99)
HCT VFR BLD CALC: 40.8 % — SIGNIFICANT CHANGE UP (ref 39–50)
HDLC SERPL-MCNC: 41 MG/DL — SIGNIFICANT CHANGE UP
HGB BLD-MCNC: 14.1 G/DL — SIGNIFICANT CHANGE UP (ref 13–17)
IMM GRANULOCYTES NFR BLD AUTO: 0.2 % — SIGNIFICANT CHANGE UP (ref 0–1.5)
LIPID PNL WITH DIRECT LDL SERPL: 68 MG/DL — SIGNIFICANT CHANGE UP
LYMPHOCYTES # BLD AUTO: 1.17 K/UL — SIGNIFICANT CHANGE UP (ref 1–3.3)
LYMPHOCYTES # BLD AUTO: 20.2 % — SIGNIFICANT CHANGE UP (ref 13–44)
MCHC RBC-ENTMCNC: 29.3 PG — SIGNIFICANT CHANGE UP (ref 27–34)
MCHC RBC-ENTMCNC: 34.6 GM/DL — SIGNIFICANT CHANGE UP (ref 32–36)
MCV RBC AUTO: 84.6 FL — SIGNIFICANT CHANGE UP (ref 80–100)
MONOCYTES # BLD AUTO: 0.52 K/UL — SIGNIFICANT CHANGE UP (ref 0–0.9)
MONOCYTES NFR BLD AUTO: 9 % — SIGNIFICANT CHANGE UP (ref 2–14)
NEUTROPHILS # BLD AUTO: 3.94 K/UL — SIGNIFICANT CHANGE UP (ref 1.8–7.4)
NEUTROPHILS NFR BLD AUTO: 68 % — SIGNIFICANT CHANGE UP (ref 43–77)
NON HDL CHOLESTEROL: 101 MG/DL — SIGNIFICANT CHANGE UP
PLATELET # BLD AUTO: 149 K/UL — LOW (ref 150–400)
POTASSIUM SERPL-MCNC: 4.2 MMOL/L — SIGNIFICANT CHANGE UP (ref 3.5–5.3)
POTASSIUM SERPL-SCNC: 4.2 MMOL/L — SIGNIFICANT CHANGE UP (ref 3.5–5.3)
RBC # BLD: 4.82 M/UL — SIGNIFICANT CHANGE UP (ref 4.2–5.8)
RBC # FLD: 13.1 % — SIGNIFICANT CHANGE UP (ref 10.3–14.5)
SODIUM SERPL-SCNC: 139 MMOL/L — SIGNIFICANT CHANGE UP (ref 135–145)
TRIGL SERPL-MCNC: 166 MG/DL — HIGH
WBC # BLD: 5.79 K/UL — SIGNIFICANT CHANGE UP (ref 3.8–10.5)
WBC # FLD AUTO: 5.79 K/UL — SIGNIFICANT CHANGE UP (ref 3.8–10.5)

## 2022-04-07 PROCEDURE — 86850 RBC ANTIBODY SCREEN: CPT

## 2022-04-07 PROCEDURE — C1889: CPT

## 2022-04-07 PROCEDURE — 83735 ASSAY OF MAGNESIUM: CPT

## 2022-04-07 PROCEDURE — 85027 COMPLETE CBC AUTOMATED: CPT

## 2022-04-07 PROCEDURE — C1769: CPT

## 2022-04-07 PROCEDURE — 80061 LIPID PANEL: CPT

## 2022-04-07 PROCEDURE — C1725: CPT

## 2022-04-07 PROCEDURE — C1894: CPT

## 2022-04-07 PROCEDURE — C1724: CPT

## 2022-04-07 PROCEDURE — 86901 BLOOD TYPING SEROLOGIC RH(D): CPT

## 2022-04-07 PROCEDURE — 80048 BASIC METABOLIC PNL TOTAL CA: CPT

## 2022-04-07 PROCEDURE — 80053 COMPREHEN METABOLIC PANEL: CPT

## 2022-04-07 PROCEDURE — 92978 ENDOLUMINL IVUS OCT C 1ST: CPT | Mod: LD

## 2022-04-07 PROCEDURE — 99232 SBSQ HOSP IP/OBS MODERATE 35: CPT

## 2022-04-07 PROCEDURE — 82962 GLUCOSE BLOOD TEST: CPT

## 2022-04-07 PROCEDURE — C1760: CPT

## 2022-04-07 PROCEDURE — C9600: CPT | Mod: RC

## 2022-04-07 PROCEDURE — 36415 COLL VENOUS BLD VENIPUNCTURE: CPT

## 2022-04-07 PROCEDURE — 86900 BLOOD TYPING SEROLOGIC ABO: CPT

## 2022-04-07 PROCEDURE — C1753: CPT

## 2022-04-07 PROCEDURE — C1887: CPT

## 2022-04-07 PROCEDURE — C9607: CPT | Mod: LD

## 2022-04-07 PROCEDURE — C1874: CPT

## 2022-04-07 PROCEDURE — 93010 ELECTROCARDIOGRAM REPORT: CPT

## 2022-04-07 PROCEDURE — 99152 MOD SED SAME PHYS/QHP 5/>YRS: CPT

## 2022-04-07 PROCEDURE — 85025 COMPLETE CBC W/AUTO DIFF WBC: CPT

## 2022-04-07 PROCEDURE — 93005 ELECTROCARDIOGRAM TRACING: CPT

## 2022-04-07 PROCEDURE — 99153 MOD SED SAME PHYS/QHP EA: CPT

## 2022-04-07 RX ORDER — DOCUSATE SODIUM 100 MG
0 CAPSULE ORAL
Qty: 0 | Refills: 0 | DISCHARGE

## 2022-04-07 RX ORDER — METFORMIN HYDROCHLORIDE 850 MG/1
0 TABLET ORAL
Qty: 0 | Refills: 1 | DISCHARGE

## 2022-04-07 RX ORDER — ENOXAPARIN SODIUM 100 MG/ML
0 INJECTION SUBCUTANEOUS
Qty: 0 | Refills: 0 | DISCHARGE

## 2022-04-07 RX ORDER — ATENOLOL 25 MG/1
1 TABLET ORAL
Qty: 0 | Refills: 1 | DISCHARGE

## 2022-04-07 RX ORDER — FAMOTIDINE 10 MG/ML
1 INJECTION INTRAVENOUS
Qty: 0 | Refills: 0 | DISCHARGE
Start: 2022-04-07

## 2022-04-07 RX ORDER — ATORVASTATIN CALCIUM 80 MG/1
1 TABLET, FILM COATED ORAL
Qty: 90 | Refills: 0
Start: 2022-04-07 | End: 2022-07-05

## 2022-04-07 RX ORDER — LOSARTAN POTASSIUM 100 MG/1
1 TABLET, FILM COATED ORAL
Qty: 0 | Refills: 0 | DISCHARGE

## 2022-04-07 RX ORDER — INSULIN GLARGINE 100 [IU]/ML
18 INJECTION, SOLUTION SUBCUTANEOUS
Qty: 0 | Refills: 0 | DISCHARGE

## 2022-04-07 RX ORDER — AMLODIPINE BESYLATE 2.5 MG/1
1 TABLET ORAL
Qty: 0 | Refills: 0 | DISCHARGE

## 2022-04-07 RX ADMIN — FAMOTIDINE 40 MILLIGRAM(S): 10 INJECTION INTRAVENOUS at 05:52

## 2022-04-07 RX ADMIN — ATENOLOL 25 MILLIGRAM(S): 25 TABLET ORAL at 05:52

## 2022-04-07 RX ADMIN — LOSARTAN POTASSIUM 50 MILLIGRAM(S): 100 TABLET, FILM COATED ORAL at 05:52

## 2022-04-07 NOTE — DISCHARGE NOTE NURSING/CASE MANAGEMENT/SOCIAL WORK - PATIENT PORTAL LINK FT
You can access the FollowMyHealth Patient Portal offered by [facility] by registering at the following website: [email or web address]. By joining [facility]’s FollowMyHealth portal, you will also be able to view your health information using other applications (apps) compatible with our system.

## 2022-04-07 NOTE — DISCHARGE NOTE NURSING/CASE MANAGEMENT/SOCIAL WORK - NSDCPEFALRISK_GEN_ALL_CORE
For information on Fall & Injury Prevention, visit: [email or web address] OR  [email or web address] OR  https://www.cdc.gov/steadi/patient.html

## 2022-04-07 NOTE — PROGRESS NOTE ADULT - SUBJECTIVE AND OBJECTIVE BOX
Department of Cardiology                                                                  [facility]/[place] [address]  [place]                                                            Telephone: [phone]. Fax:[phone]                                                                                         CARDIOLOGY NOTE       Subjective:  51y Male who had a left heart catheterization which showed:  CORONARY VESSELS: The coronary circulation is right dominant.  LM:     --  Distal left main: There was a 50 % stenosis.  LAD:     --  LAD: There was a stenosis. This lesion is a chronic total occlusion. It appears amenable to percutaneous intervention.  --  Prox: LAD: 90% stenosis which was treated with a SYNERGY XD 3.50 X 16MM drug-eluting stent post dilated with a NC EMERGE 4.00 X 15MM balloon.  --  Mid LAD: There was a diffuse 99 % stenosis. The lesion was heavily calcified. This lesion is a chronic total occlusion. It appears amenable to percutaneous intervention. It was treated with rotational atherectomy and a SYNERGY XD 2.75 X 38MM drug-eluting stent post dilated with a 3.0 X 15 NC EUPHORA balloon.  --  Distal LAD: Diffuse 60-90% disease.  CX:     --  Circumflex: Diffuse 50-80% disease. SVG patent on previous angiogram.  RCA:     --  Proximal RCA: This vessel was not injected.    Interval history: No chest pain, SOB, or palpitations overnight.    HPI: 52 y/o M with PMH HLD, DM, CAD S/P CABG(LIMA-LAD, SVG-OM, SVG-Diag, SVG-RPDA 18), COVID  with c/o chest pain and NST with paty infarct ischemia. Recent LHC revealed: SVG to Diag, circ, RCA patent.  LIMA to LAD atretic.  LAD with diffuse dz ().  Pt to return for staged procedure to  of LAD.     PAST MEDICAL & SURGICAL HISTORY:  DM2  HTN (hypertension)  HLD (hyperlipidemia)  CAD (coronary artery disease)  History of 2019 novel coronavirus disease (COVID-19):   S/P CABG x 4    Allergies: No Known Allergies    Home Medications:  amLODIPine 5 mg oral tablet: 1 tab(s) orally once a day (2022 12:21)  aspirin 81 mg oral tablet, chewable: 1 tab(s) orally once a day (2022 12:21)  atenolol 25 mg oral tablet: 1 tab(s) orally once a day (2022 19:55)  Basaglar KwikPen 100 units/mL subcutaneous solution: 18 unit(s) subcutaneous once a day (2022 12:21)  Colace:  (2022 19:55)  Lantus Solostar Pen 100 units/mL subcutaneous solution:  (2022 19:55)  losartan 50 mg oral tablet: 1 tab(s) orally once a day (2022 12:21)  metFORMIN 750 mg oral tablet, extended release: hold for 48 hours (2022 12:21)  pravastatin 40 mg oral tablet: 1 tab(s) orally once a day (2022 12:21)    MEDICATIONS  (STANDING):  amLODIPine   Tablet 5 milliGRAM(s) Oral daily  aspirin  chewable 81 milliGRAM(s) Oral daily  ATENolol  Tablet 25 milliGRAM(s) Oral daily  atorvastatin 10 milliGRAM(s) Oral at bedtime  chlorhexidine 4% Liquid 1 Application(s) Topical Once  clopidogrel Tablet 75 milliGRAM(s) Oral daily  dextrose 5%. 1000 milliLiter(s) (50 mL/Hr) IV Continuous <Continuous>  dextrose 5%. 1000 milliLiter(s) (100 mL/Hr) IV Continuous <Continuous>  dextrose 50% Injectable 25 Gram(s) IV Push once  dextrose 50% Injectable 12.5 Gram(s) IV Push once  dextrose 50% Injectable 25 Gram(s) IV Push once  famotidine    Tablet 40 milliGRAM(s) Oral two times a day  glucagon  Injectable 1 milliGRAM(s) IntraMuscular once  insulin lispro (ADMELOG) corrective regimen sliding scale   SubCutaneous three times a day before meals  insulin lispro (ADMELOG) corrective regimen sliding scale   SubCutaneous at bedtime  losartan 50 milliGRAM(s) Oral daily  sodium chloride 0.9% Bolus 250 milliLiter(s) IV Bolus once    MEDICATIONS  (PRN):  aluminum hydroxide/magnesium hydroxide/simethicone Suspension 30 milliLiter(s) Oral every 4 hours PRN Dyspepsia  dextrose Oral Gel 15 Gram(s) Oral once PRN Blood Glucose LESS THAN 70 milliGRAM(s)/deciliter    Objective:  Vital Signs Last 24 Hrs  T(C): 36.4 (2022 06:00), Max: 36.7 (2022 12:39)  T(F): 97.5 (2022 06:00), Max: 98.1 (2022 12:39)  HR: 59 (2022 06:00) (48 - 61)  BP: 125/79 (2022 06:00) (109/66 - 155/81)  BP(mean): 97 (2022 06:00) (82 - 108)  RR: 17 (2022 06:00) (12 - 17)  SpO2: 100% (2022 20:30) (96% - 100%)    CM: SR  Neuro: A&OX3, CN 2-12 intact  HEENT: NC, AT  Lungs: CTA B/L  CV: S1, S2, no murmur, RRR  Abd: Soft  Right Groin: Soft, no bleeding, no hematoma  Extremity: + distal pulses    EK.1   5.79  )-----------( 149      ( 2022 06:06 )             40.8     04-07    139  |  102  |  9.7  ----------------------------<  130  4.2   |  25.0  |  0.89    Ca    9.1      2022 06:05  Mg     2.0     04-06    TPro  7.8  /  Alb  4.9  /  TBili  0.4  /  DBili  x   /  AST  23  /  ALT  17  /  AlkPhos  50  04-06    Lipids       Total Cholesterol:        Triglycerides:        HDL:        Non-HDL:        LDL:     HgbA1C: 6.5% on 3/11/2022                                                                              Department of Cardiology                                                                  [facility]/[place] [address]  [place]                                                            Telephone: [phone]. Fax:[phone]                                                                                         CARDIOLOGY NOTE       Subjective:  51y Male who had a left heart catheterization which showed:  CORONARY VESSELS: The coronary circulation is right dominant.  LM:     --  Distal left main: There was a 50 % stenosis.  LAD:     --  LAD: There was a stenosis. This lesion is a chronic total occlusion. It appears amenable to percutaneous intervention.  --  Prox: LAD: 90% stenosis which was treated with a SYNERGY XD 3.50 X 16MM drug-eluting stent post dilated with a NC EMERGE 4.00 X 15MM balloon.  --  Mid LAD: There was a diffuse 99 % stenosis. The lesion was heavily calcified. This lesion is a chronic total occlusion. It appears amenable to percutaneous intervention. It was treated with rotational atherectomy and a SYNERGY XD 2.75 X 38MM drug-eluting stent post dilated with a 3.0 X 15 NC EUPHORA balloon.  --  Distal LAD: Diffuse 60-90% disease.  CX:     --  Circumflex: Diffuse 50-80% disease. SVG patent on previous angiogram.  RCA:     --  Proximal RCA: This vessel was not injected.    Interval history: No chest pain, SOB, or palpitations overnight.    HPI: 52 y/o M with PMH HLD, DM, CAD S/P CABG(LIMA-LAD, SVG-OM, SVG-Diag, SVG-RPDA 7/9/18), COVID 12/21 with c/o chest pain and NST with paty infarct ischemia. Recent LHC revealed: SVG to Diag, circ, RCA patent.  LIMA to LAD atretic.  LAD with diffuse dz ().  Pt to return for staged procedure to  of LAD.     PAST MEDICAL & SURGICAL HISTORY:  DM2  HTN (hypertension)  HLD (hyperlipidemia)  CAD (coronary artery disease)  History of 2019 novel coronavirus disease (COVID-19): 12/21  S/P CABG x 4    Allergies: No Known Allergies    Home Medications:  amLODIPine 5 mg oral tablet: 1 tab(s) orally once a day (06 Apr 2022 12:21)  aspirin 81 mg oral tablet, chewable: 1 tab(s) orally once a day (06 Apr 2022 12:21)  atenolol 25 mg oral tablet: 1 tab(s) orally once a day (06 Apr 2022 19:55)  Basaglar KwikPen 100 units/mL subcutaneous solution: 18 unit(s) subcutaneous once a day (06 Apr 2022 12:21)  Colace:  (06 Apr 2022 19:55)  Lantus Solostar Pen 100 units/mL subcutaneous solution:  (06 Apr 2022 19:55)  losartan 50 mg oral tablet: 1 tab(s) orally once a day (06 Apr 2022 12:21)  metFORMIN 750 mg oral tablet, extended release: hold for 48 hours (06 Apr 2022 12:21)  pravastatin 40 mg oral tablet: 1 tab(s) orally once a day (06 Apr 2022 12:21)    MEDICATIONS  (STANDING):  amLODIPine   Tablet 5 milliGRAM(s) Oral daily  aspirin  chewable 81 milliGRAM(s) Oral daily  ATENolol  Tablet 25 milliGRAM(s) Oral daily  atorvastatin 10 milliGRAM(s) Oral at bedtime  chlorhexidine 4% Liquid 1 Application(s) Topical Once  clopidogrel Tablet 75 milliGRAM(s) Oral daily  dextrose 5%. 1000 milliLiter(s) (50 mL/Hr) IV Continuous <Continuous>  dextrose 5%. 1000 milliLiter(s) (100 mL/Hr) IV Continuous <Continuous>  dextrose 50% Injectable 25 Gram(s) IV Push once  dextrose 50% Injectable 12.5 Gram(s) IV Push once  dextrose 50% Injectable 25 Gram(s) IV Push once  famotidine    Tablet 40 milliGRAM(s) Oral two times a day  glucagon  Injectable 1 milliGRAM(s) IntraMuscular once  insulin lispro (ADMELOG) corrective regimen sliding scale   SubCutaneous three times a day before meals  insulin lispro (ADMELOG) corrective regimen sliding scale   SubCutaneous at bedtime  losartan 50 milliGRAM(s) Oral daily  sodium chloride 0.9% Bolus 250 milliLiter(s) IV Bolus once    MEDICATIONS  (PRN):  aluminum hydroxide/magnesium hydroxide/simethicone Suspension 30 milliLiter(s) Oral every 4 hours PRN Dyspepsia  dextrose Oral Gel 15 Gram(s) Oral once PRN Blood Glucose LESS THAN 70 milliGRAM(s)/deciliter    Objective:  Vital Signs Last 24 Hrs  T(C): 36.4 (07 Apr 2022 06:00), Max: 36.7 (06 Apr 2022 12:39)  T(F): 97.5 (07 Apr 2022 06:00), Max: 98.1 (06 Apr 2022 12:39)  HR: 59 (07 Apr 2022 06:00) (48 - 61)  BP: 125/79 (07 Apr 2022 06:00) (109/66 - 155/81)  BP(mean): 97 (07 Apr 2022 06:00) (82 - 108)  RR: 17 (07 Apr 2022 06:00) (12 - 17)  SpO2: 100% (06 Apr 2022 20:30) (96% - 100%)    CM: SB to SR  Neuro: A&OX3, CN 2-12 intact  HEENT: NC, AT  Lungs: CTA B/L  CV: S1, S2, no murmur, RRR  Abd: Soft  Right Groin: Soft, no bleeding, no hematoma  Extremity: + distal pulses    EKG: SB, LAD, LVH with repolarization abnormality                          14.1   5.79  )-----------( 149      ( 07 Apr 2022 06:06 )             40.8     04-07    139  |  102  |  9.7  ----------------------------<  130  4.2   |  25.0  |  0.89    Ca    9.1      07 Apr 2022 06:05  Mg     2.0     04-06    TPro  7.8  /  Alb  4.9  /  TBili  0.4  /  DBili  x   /  AST  23  /  ALT  17  /  AlkPhos  50  04-06    Lipids       Total Cholesterol:        Triglycerides:        HDL:        Non-HDL:        LDL:     HgbA1C: 6.5% on 3/11/2022                                                                              Department of Cardiology                                                                  [facility]/[place] [address]  [place]                                                            Telephone: [phone]. Fax:[phone]                                                                                         CARDIOLOGY NOTE       Subjective:  51y Male who had a left heart catheterization which showed:  CORONARY VESSELS: The coronary circulation is right dominant.  LM:     --  Distal left main: There was a 50 % stenosis.  LAD:     --  LAD: There was a stenosis. This lesion is a chronic total occlusion. It appears amenable to percutaneous intervention.  --  Prox: LAD: 90% stenosis which was treated with a SYNERGY XD 3.50 X 16MM drug-eluting stent post dilated with a NC EMERGE 4.00 X 15MM balloon.  --  Mid LAD: There was a diffuse 99 % stenosis. The lesion was heavily calcified. This lesion is a chronic total occlusion. It appears amenable to percutaneous intervention. It was treated with rotational atherectomy and a SYNERGY XD 2.75 X 38MM drug-eluting stent post dilated with a 3.0 X 15 NC EUPHORA balloon.  --  Distal LAD: Diffuse 60-90% disease.  CX:     --  Circumflex: Diffuse 50-80% disease. SVG patent on previous angiogram.  RCA:     --  Proximal RCA: This vessel was not injected.    Interval history: No chest pain, SOB, or palpitations overnight.    HPI: 50 y/o M with PMH HLD, DM, CAD S/P CABG(LIMA-LAD, SVG-OM, SVG-Diag, SVG-RPDA 7/9/18), COVID 12/21 with c/o chest pain and NST with paty infarct ischemia. Recent LHC revealed: SVG to Diag, circ, RCA patent.  LIMA to LAD atretic.  LAD with diffuse dz ().  Pt to return for staged procedure to  of LAD.     PAST MEDICAL & SURGICAL HISTORY:  DM2  HTN (hypertension)  HLD (hyperlipidemia)  CAD (coronary artery disease)  History of 2019 novel coronavirus disease (COVID-19): 12/21  S/P CABG x 4    Allergies: No Known Allergies    Home Medications:  amLODIPine 5 mg oral tablet: 1 tab(s) orally once a day (06 Apr 2022 12:21)  aspirin 81 mg oral tablet, chewable: 1 tab(s) orally once a day (06 Apr 2022 12:21)  atenolol 25 mg oral tablet: 1 tab(s) orally once a day (06 Apr 2022 19:55)  Basaglar KwikPen 100 units/mL subcutaneous solution: 18 unit(s) subcutaneous once a day (06 Apr 2022 12:21)  Colace:  (06 Apr 2022 19:55)  Lantus Solostar Pen 100 units/mL subcutaneous solution:  (06 Apr 2022 19:55)  losartan 50 mg oral tablet: 1 tab(s) orally once a day (06 Apr 2022 12:21)  metFORMIN 750 mg oral tablet, extended release: hold for 48 hours (06 Apr 2022 12:21)  pravastatin 40 mg oral tablet: 1 tab(s) orally once a day (06 Apr 2022 12:21)    MEDICATIONS  (STANDING):  amLODIPine   Tablet 5 milliGRAM(s) Oral daily  aspirin  chewable 81 milliGRAM(s) Oral daily  ATENolol  Tablet 25 milliGRAM(s) Oral daily  atorvastatin 10 milliGRAM(s) Oral at bedtime  chlorhexidine 4% Liquid 1 Application(s) Topical Once  clopidogrel Tablet 75 milliGRAM(s) Oral daily  dextrose 5%. 1000 milliLiter(s) (50 mL/Hr) IV Continuous <Continuous>  dextrose 5%. 1000 milliLiter(s) (100 mL/Hr) IV Continuous <Continuous>  dextrose 50% Injectable 25 Gram(s) IV Push once  dextrose 50% Injectable 12.5 Gram(s) IV Push once  dextrose 50% Injectable 25 Gram(s) IV Push once  famotidine    Tablet 40 milliGRAM(s) Oral two times a day  glucagon  Injectable 1 milliGRAM(s) IntraMuscular once  insulin lispro (ADMELOG) corrective regimen sliding scale   SubCutaneous three times a day before meals  insulin lispro (ADMELOG) corrective regimen sliding scale   SubCutaneous at bedtime  losartan 50 milliGRAM(s) Oral daily  sodium chloride 0.9% Bolus 250 milliLiter(s) IV Bolus once    MEDICATIONS  (PRN):  aluminum hydroxide/magnesium hydroxide/simethicone Suspension 30 milliLiter(s) Oral every 4 hours PRN Dyspepsia  dextrose Oral Gel 15 Gram(s) Oral once PRN Blood Glucose LESS THAN 70 milliGRAM(s)/deciliter    Objective:  Vital Signs Last 24 Hrs  T(C): 36.4 (07 Apr 2022 06:00), Max: 36.7 (06 Apr 2022 12:39)  T(F): 97.5 (07 Apr 2022 06:00), Max: 98.1 (06 Apr 2022 12:39)  HR: 59 (07 Apr 2022 06:00) (48 - 61)  BP: 125/79 (07 Apr 2022 06:00) (109/66 - 155/81)  BP(mean): 97 (07 Apr 2022 06:00) (82 - 108)  RR: 17 (07 Apr 2022 06:00) (12 - 17)  SpO2: 100% (06 Apr 2022 20:30) (96% - 100%)    CM: SB to SR  Neuro: A&OX3, CN 2-12 intact  HEENT: NC, AT  Lungs: CTA B/L  CV: S1, S2, no murmur, RRR  Abd: Soft  Right Groin: Soft, no bleeding, no hematoma  Extremity: + distal pulses    EKG: SB, LAD, LVH with repolarization abnormality                          14.1   5.79  )-----------( 149      ( 07 Apr 2022 06:06 )             40.8     04-07    139  |  102  |  9.7  ----------------------------<  130  4.2   |  25.0  |  0.89    Ca    9.1      07 Apr 2022 06:05  Mg     2.0     04-06    TPro  7.8  /  Alb  4.9  /  TBili  0.4  /  DBili  x   /  AST  23  /  ALT  17  /  AlkPhos  50  04-06    Lipids       Total Cholesterol: 142       Triglycerides: 166       HDL: 41       Non-HDL: 101       LDL: 68    HgbA1C: 6.5% on 3/11/2022

## 2022-04-07 NOTE — DISCHARGE NOTE PROVIDER - NSDCMRMEDTOKEN_GEN_ALL_CORE_FT
amLODIPine 5 mg oral tablet: 1 tab(s) orally once a day  aspirin 81 mg oral tablet, chewable: 1 tab(s) orally once a day  atenolol 25 mg oral tablet: 1 tab(s) orally once a day  Basaglar KwikPen 100 units/mL subcutaneous solution: 18 unit(s) subcutaneous once a day  clopidogrel 75 mg oral tablet: 1 tab(s) orally once a day  Colace:   losartan 50 mg oral tablet: 1 tab(s) orally once a day  metFORMIN 750 mg oral tablet, extended release: hold for 48 hours  pravastatin 40 mg oral tablet: 1 tab(s) orally once a day   amLODIPine 5 mg oral tablet: 1 tab(s) orally once a day  aspirin 81 mg oral tablet, chewable: 1 tab(s) orally once a day  atenolol 25 mg oral tablet: 1 tab(s) orally once a day  atorvastatin 40 mg oral tablet: 1 tab(s) orally once a day  Basaglar KwikPen 100 units/mL subcutaneous solution: 18 unit(s) subcutaneous once a day  clopidogrel 75 mg oral tablet: 1 tab(s) orally once a day  Colace:   famotidine 40 mg oral tablet: 1 tab(s) orally 2 times a day  losartan 50 mg oral tablet: 1 tab(s) orally once a day  metFORMIN 750 mg oral tablet, extended release: RESTART ON APRIL 9, 2022

## 2022-04-07 NOTE — DISCHARGE NOTE PROVIDER - NSDCCPTREATMENT_GEN_ALL_CORE_FT
PRINCIPAL PROCEDURE  Procedure: Atherectomy of left anterior descending (LAD) coronary artery with insertion of drug eluting stent  Findings and Treatment:   CORONARY VESSELS: The coronary circulation is right dominant.  LM:     --  Distal left main: There was a 50 % stenosis.  LAD:     --  LAD: There was a stenosis. This lesion is a chronic total occlusion. It appears amenable to percutaneous intervention.  --  Prox: LAD: 90% stenosis which was treated with a SYNERGY XD 3.50 X 16MM drug-eluting stent post dilated with a NC EMERGE 4.00 X 15MM balloon.  --  Mid LAD: There was a diffuse 99 % stenosis. The lesion was heavily calcified. This lesion is a chronic total occlusion. It appears amenable to percutaneous intervention. It was treated with rotational atherectomy and a SYNERGY XD 2.75 X 38MM drug-eluting stent post dilated with a 3.0 X 15 NC EUPHORA balloon.  --  Distal LAD: Diffuse 60-90% disease.  CX:     --  Circumflex: Diffuse 50-80% disease. SVG patent on previous angiogram.  RCA:     --  Proximal RCA: This vessel was not injected.

## 2022-04-07 NOTE — PROGRESS NOTE ADULT - ASSESSMENT
Procedure: LHC and PCI of the     1. S/P LHC and PCI with rotational atherectomy and JOSE of the proximal and mid LAD  ·	Post procedure instructions explained.  ·	Medications:   ·	     DAPT: Plavix 75 mg daily and aspirin 81 mg daily  ·	     Statin:   ·	     Beta Blocker:   ·	     RAAS Inhibition:   ·	     Other Medications:   ·	Cardiac rehab info provided/referral and communication to cardiac rehab completed  ·	Aggressive lifestyle modification.    2. DM2  ·	GDMT:   ·	     Diabetic diet.  ·	     FS Q AC and HS with coverage  ·	     HgbA1C:   ·	     Basal Insulin:   ·	     Nutritional Insulin:   ·	     Oral Antihyperglycemics:   ·	     SGLT2i/GLP1-RA:     3. Mixed hyperlipidemia  ·	Goal Non-HDL < 100, LDL < 70  ·	Lipid Panel:   ·	Statin:   ·	Other:     4. HTN  ·	    Discharge Planning:   ·	Discharge home today  ·	Follow up with: Dr. Muñiz   Procedure: LHC and PCI of the proximal and mid LAD    1. S/P LHC and PCI with rotational atherectomy and JOSE of the proximal and mid LAD  ·	Post procedure instructions explained.  ·	Medications:   ·	     DAPT: Plavix 75 mg daily and aspirin 81 mg daily  ·	     Statin:   ·	     Beta Blocker: Atenolol 25 mg daily  ·	     RAAS Inhibition: Losartan 25 mg daily  ·	     Other Medications: Amlodipine 5 mg daily  ·	Cardiac rehab info provided/referral and communication to cardiac rehab completed  ·	Aggressive lifestyle modification.    2. DM2  ·	GDMT:   ·	     Diabetic diet.  ·	     FS Q AC and HS with coverage  ·	     HgbA1C: 6.5% on 3/11/2022  ·	     Basal Insulin: Restart Basaglar 18 units daily  ·	     Nutritional Insulin: N/A  ·	     Oral Antihyperglycemics: Resume metformin 750 mg BID on April 9, 2022  ·	     SGLT2i/GLP1-RA: N/A    3. Mixed hyperlipidemia  ·	Goal Non-HDL < 100, LDL < 70  ·	Lipid Panel:   ·	Statin:   ·	Other:     4. HTN  ·	    Discharge Planning:   ·	Discharge home today  ·	Follow up with: Dr. Muñiz   Procedure: LHC and PCI of the proximal and mid LAD    1. S/P LHC and PCI with rotational atherectomy and JOSE of the proximal and mid LAD  ·	Post procedure instructions explained.  ·	Medications:   ·	     DAPT: Plavix 75 mg daily and aspirin 81 mg daily  ·	     Statin: Will change to Lipitor 40 mg daily as per ACC guidelines  ·	     Beta Blocker: Atenolol 25 mg daily  ·	     RAAS Inhibition: Losartan 25 mg daily  ·	     Other Medications: Amlodipine 5 mg daily  ·	Cardiac rehab info provided/referral and communication to cardiac rehab completed  ·	Aggressive lifestyle modification.    2. DM2  ·	GDMT:   ·	     Diabetic diet.  ·	     FS Q AC and HS with coverage  ·	     HgbA1C: 6.5% on 3/11/2022  ·	     Basal Insulin: Restart Basaglar 18 units daily  ·	     Nutritional Insulin: N/A  ·	     Oral Antihyperglycemics: Resume metformin 750 mg BID on April 9, 2022  ·	     SGLT2i/GLP1-RA: N/A    3. Mixed hyperlipidemia  ·	Goal Non-HDL < 100, LDL < 70  ·	Lipid Panel: Triglycerides elevated  ·	Statin: Will change to Lipitor 40 mg daily as per ACC guidelines  ·	Other: N/A    4. HTN  ·	Continue atenolol 25 mg daily  ·	Continue losartan 25 mg daily  ·	Continue amlodipine 5 mg daily    Discharge Planning:   ·	Discharge home today  ·	Follow up with: Dr. Muñiz

## 2022-04-07 NOTE — DISCHARGE NOTE PROVIDER - NSDCCPCAREPLAN_GEN_ALL_CORE_FT
PRINCIPAL DISCHARGE DIAGNOSIS  Diagnosis: Coronary artery disease of native artery of native heart with stable angina pectoris  Assessment and Plan of Treatment:   Post procedure instructions explained.  Medications:        DAPT: Plavix 75 mg daily and aspirin 81 mg daily       Statin: Will change to Lipitor 40 mg daily as per ACC guidelines       Beta Blocker: Atenolol 25 mg daily       RAAS Inhibition: Losartan 25 mg daily       Other Medications: Amlodipine 5 mg daily  Cardiac rehab info provided/referral and communication to cardiac rehab completed  Aggressive lifestyle modification.      SECONDARY DISCHARGE DIAGNOSES  Diagnosis: Type 2 diabetes mellitus with other circulatory complication, with long-term current use of insulin  Assessment and Plan of Treatment:   GDMT:        Diabetic diet.       FS Q AC and HS with coverage       HgbA1C: 6.5% on 3/11/2022       Basal Insulin: Restart Basaglar 18 units daily       Nutritional Insulin: N/A       Oral Antihyperglycemics: Resume metformin 750 mg BID on April 9, 2022       SGLT2i/GLP1-RA: N/A    Diagnosis: Mixed hyperlipidemia  Assessment and Plan of Treatment:   Goal Non-HDL < 100, LDL < 70  Lipid Panel: Triglycerides elevated  Statin: Will change to Lipitor 40 mg daily as per ACC guidelines  Other: N/A    Diagnosis: Primary hypertension  Assessment and Plan of Treatment:   Continue atenolol 25 mg daily  Continue losartan 25 mg daily  Continue amlodipine 5 mg daily

## 2022-04-07 NOTE — DISCHARGE NOTE PROVIDER - HOSPITAL COURSE
50 y/o M with PMH HLD, DM, CAD S/P CABG(LIMA-LAD, SVG-OM, SVG-Diag, SVG-RPDA 7/9/18), COVID 12/21 with c/o chest pain and NST with paty infarct ischemia. Recent LHC revealed: SVG to Diag, circ, RCA patent.  LIMA to LAD atretic.  LAD with diffuse dz ().  Pt to return for staged procedure to  of LAD. He had a LHC and PCI with rotational atherectomy and JOSE of the proximal and mid LAD.

## 2022-04-07 NOTE — DISCHARGE NOTE NURSING/CASE MANAGEMENT/SOCIAL WORK - HAVE YOU HAD A FIRST COVID-19 BOOSTER?
Low Back Pain H & P    Chief Complaint: Patient presents with:  Low Back Pain: Patient presents for follow up on low back pain, had Right L1-2 facet joint aspiration and injection under fluoroscopy, local 10/24/18.  States he only got 15 relief from the inj Genitourinary: Negative for dysuria, hematuria and urgency. Musculoskeletal: Positive for back pain and neck pain. Negative for falls. Feels with poor posture his neck is painful occas   Skin: Negative for itching and rash.    Neurological: Negat Transportation needs - medical: Not on file      Transportation needs - non-medical: Not on file    Occupational History      Occupation: [address],     Tobacco Use      Smoking status: Former Smoker        Packs/day: 1.00        Years: help. He will call with a status update and if no improvement we can discuss doing left L3 TFESI.     Procedure note: Trigger point injections  Procedure Diagnosis: Myofascial pain    Summary of Procedure:   Risks and benefits of the procedure were discusse No

## 2022-04-07 NOTE — DISCHARGE NOTE PROVIDER - CARE PROVIDER_API CALL
Dereck Muñiz)  Cardiology; Cardiovascular Disease; Internal Medicine  [address] [place] [address]  Phone: [phone]  Fax: [phone]  Established Patient  Follow Up Time: 2 weeks

## 2022-04-07 NOTE — DISCHARGE NOTE PROVIDER - CARE PROVIDERS DIRECT ADDRESSES
,[email or web address], [email or web address]

## 2022-06-20 NOTE — ED CLERICAL - DIVISION
Request for refill of   Name from pharmacy: VALACYCLOVIR 500MG TABLETS   Will file in chart as: valACYclovir (VALTREX) 500 MG tablet   The original prescription was reordered on 6/20/2022 by Maribell Messer MD. Renewing this prescription may not be appropriate.    Possible duplicate: Hover to review recent actions on this medication   Sig: TAKE 1 TABLET BY MOUTH TWICE DAILY   Disp:  60 tablet    Refills:  0 (Pharmacy requested: Not specified)   Start: 6/20/2022   Non-formulary For: Genital herpes simplex, unspecified site   Last ordered: 3 months ago by Maribell Messer MD Last refill: 3/1/2022      [id number] [id number] AT [facility] [address] [facility]      [place]...

## 2022-07-05 ENCOUNTER — RX RENEWAL (OUTPATIENT)
Age: 52
End: 2022-07-05

## 2022-07-10 NOTE — ED PROVIDER NOTE - PMH
Hospital Medicine History & Physical      PCP: Sammie Merrill DO    Date of Admission: 7/6/2018    Date of Service: Pt seen/examined on 7/7/18 and Placed in Observation. Chief Complaint:  Headache and shortness of breath      History Of Present Illness:      66 y.o. male who presented to [facility] with with past medical history of coronary artery disease status post stents ×3 in 2005 and angioplasty in 1995 following non-STEMI ×2, hypertension, hyperlipidemia and ascending aortic aneurysm being followed by vascular surgery. Patient just left the hospital 4 days ago following hospitalization for chest pain and had negative workup. He was seen by cardiologist for bradycardia which is being followed and managed conservatively. Patient was discharged home on Imdur. After an hour of taking the 1st dose, he started having frontal headache. He describes this as constant, severe, dull pain that was minimally relieved with Tylenol. Got some relief after migraine cocktail in the ER. This was associated with nausea but no vomiting, he, according to his wife had behavioral changes at home which is unusual for him. He was frustrated and irritable because of the headache. He has not been eating or drinking much for the past 5 days. No dizziness. No focal weakness. He continues to have shortness of breath on occasion. No chest pain. No palpitations. His wife called PCP and he recommended reevaluation in the hospital.    Vitals notable for blood pressure 166/86, pulse rate ranged between 54 and 66. Labs are normal including CBC, chemistry, troponin and urinalysis showed small amount of ketone. Glucose 125. Chest x-ray is negative. CT scan of the head shows left maxillary sinus disease. EKG shows sinus bradycardia rate of 59 unchanged from prior. He is being admitted for further management.     Past Medical History:          Diagnosis Date    CAD (coronary artery disease)     COPD (chronic obstructive pulmonary disease) ([address]     Epigastric pain     Hyperlipidemia     Hypertension     Kidney stones     MI (myocardial infarction) 2005    On aspirin at home     history cad with stents    Pre-diabetes 7/3/2018       Past Surgical History:          Procedure Laterality Date   [address]    angioplasty   Cam Jonathan CARDIAC SURGERY  2005    stents    CHOLECYSTECTOMY  6/2012    COLONOSCOPY      ENDOSCOPY, COLON, DIAGNOSTIC  06/13/2017    biopsy    ERCP  2015    ERCP  9/15/2015    ERCP  08/10/2017    with balloon sweep    TONSILLECTOMY  1961       Medications Prior to Admission:      Prior to Admission medications    Medication Sig Start Date End Date Taking? Authorizing Provider   aspirin 81 MG EC tablet Take 1 tablet by mouth every morning Last dose 8-2-17 1/15/18  Yes Lonnie Kohler DO   omeprazole (PRILOSEC) 20 MG delayed release capsule Take 1 capsule by mouth daily 1/15/18  Yes Lonnie Kohler DO   rosuvastatin (CRESTOR) 40 MG tablet Take 1 tablet by mouth every morning  Patient taking differently: Take 20 mg by mouth every morning  12/11/17  Yes Lonnie Kohler, DO   nitroGLYCERIN (NITROSTAT) 0.4 MG SL tablet up to max of 3 total doses. If no relief after 1 dose, call 911. 5/24/17  Yes Huey Perez MD   isosorbide mononitrate (IMDUR) 30 MG extended release tablet Take 1 tablet by mouth daily 7/3/18   Mari Jarrett MD       Allergies:  Patient has no known allergies. Social History:      The patient currently lives At home with his wife. TOBACCO:   reports that he has never smoked. He has never used smokeless tobacco.  ETOH:   reports that he does not drink alcohol. Family History:     Reviewed in detail Positive as follows: Hypertension and suicide in father, coronary disease and stroke in mother, sister had coronary artery disease, CHF and COPD    History reviewed. No pertinent family history. REVIEW OF SYSTEMS:   Pertinent positives as noted in the HPI.  All other systems reviewed and negative. PHYSICAL EXAM:    BP (!) 166/86   Pulse 66   Temp 97.2 °F (36.2 °C) (Temporal)   Resp 16   Ht 6' 1\" (1.854 m)   Wt 191 lb 6 oz (86.8 kg)   SpO2 96%   BMI 25.25 kg/m²     General appearance:  Elderly male, weak, mild painful distress, afebrile. appears stated age and cooperative. HEENT:  Normal cephalic, atraumatic without obvious deformity. Pupils equal, round, and reactive to light. Extra ocular muscles intact. Conjunctivae/corneas clear. Neck: Supple, with full range of motion. No jugular venous distention. Trachea midline. Respiratory:  Normal respiratory effort. Clear to auscultation, bilaterally without Rales/Wheezes/Rhonchi. Cardiovascular:  Regular rate and rhythm with normal S1/S2 without murmurs, rubs or gallops. Abdomen: Soft, non-tender, non-distended with normal bowel sounds. Musculoskeletal:  No clubbing, cyanosis or edema bilaterally. Full range of motion without deformity. Skin: Skin color, texture, turgor normal.  No rashes or lesions. Neurologic:  Neurovascularly intact without any focal sensory/motor deficits. Cranial nerves: II-XII intact, grossly non-focal.  Psychiatric:  Alert and oriented, thought content appropriate, normal insight  Capillary Refill: Brisk,< 3 seconds   Peripheral Pulses: +2 palpable, equal bilaterally       Labs:     Recent Labs      07/06/18   1805   WBC  7.5   HGB  15.6   HCT  45.1   PLT  166     Recent Labs      07/06/18   1805   NA  144   K  4.3   CL  106   CO2  25   BUN  20   CREATININE  0.9   CALCIUM  9.6     Recent Labs      07/06/18   1805   AST  12   ALT  10   BILITOT  1.3*   ALKPHOS  52     No results for input(s): INR in the last 72 hours.   Recent Labs      07/06/18   1805   TROPONINI  <0.01       Urinalysis:      Lab Results   Component Value Date    NITRU Negative 07/06/2018    WBCUA 0-1 06/01/2012    BACTERIA RARE 06/01/2012    RBCUA NONE 06/01/2012    BLOODU Negative 07/06/2018    SPECGRAV 1.025 07/06/2018    GLUCOSEU Negative 07/06/2018    Arabella Gonzalez 994 NEGATIVE 06/01/2012       Radiology:     CXR: I have reviewed the CXR with the following interpretation: Negative  EKG:  I have reviewed the EKG with the following interpretation: Documented above    CT Head WO Contrast   Final Result      1. No evidence of acute intracranial hemorrhage or edema. 2. Findings suggestive of left maxillary sinusitis. XR CHEST STANDARD (2 VW)   Final Result   1. No acute pleuroparenchymal disease. ASSESSMENT:    Active Hospital Problems    Diagnosis Date Noted    Intractable headache caused by drug [G44.41] 07/07/2018    Maxillary sinusitis [J32.0] 07/07/2018    Bradycardia [R00.1] 07/07/2018    SOB (shortness of breath) [R06.02] 07/02/2018    Accelerated hypertension [I10] 07/02/2018    Ascending aortic aneurysm ([address] [I71.2] 07/02/2018    Emphysema lung ([address] [J43.9] 07/02/2018    CAD (coronary artery disease) [I25.10] 05/24/2017    Hyperlipidemia [E78.5] 05/24/2017     PLAN:  #1. Intractable headache secondary to imdur usage/sinus disease/uncontrolled hypertension. CT scan of the head is negative for any acute intracranial findings. Stop Imdur, if persistent headache, consider MRI of the brain. Tylenol as needed for pain. #2. Sinus bradycardia, continuous cardiac monitoring. Normal TSH and magnesium level recently. #3. Accelerated hypertension, patient is currently not on any medications at home. May need to start low-dose Norvasc or lisinopril. Monitor BP closely. Was normal on arrival.  #4. Left maxillary sinusitis, Flonase and nasal saline as needed. #5. Shortness of breath, emphysema on prior chest CT. Clerance Riis Breathing treatments as needed. He does not have PE, he recently had negative stress test and normal echo 4 days ago. EF 55-60%. .  #6. Dehydration with anorexia and ketonuria from starvation. IV fluids. Encourage oral intake. .  #7. Coronary artery disease, no chest pain at this time.   8. HLD (hyperlipidemia)    HTN (hypertension)    IDDM (insulin dependent diabetes mellitus) [facility]

## 2023-02-09 NOTE — ED ADULT NURSE NOTE - NS ED NURSE LEVEL OF CONSCIOUSNESS ORIENTATION
Oriented - self; Oriented - place; Oriented - time [FreeTextEntry1] : Weight management referral\par Return as needed

## 2023-06-12 NOTE — ED PROVIDER NOTE - ATTENDING CONTRIBUTION TO CARE
I personally saw the patient with the resident, and completed the key components of the history and physical exam. I then discussed the management plan with the resident.    50 y/o M with PMH HTN, DM, CAD s/p CABG presents with syncopal episode today after seeing his daughter syncopize in the bathroom. He ran to her when he heard her fall, was trying to get her up, sat on the edge of the bathtub, felt dizzy and passed out. He is unsure how long he was out, but woke up on the floor with EMTs around him. He also notes that 5 nights ago, which was his first day of being symptomatic with Covid with high fevers, at about 3 am he got up in the middle of the night to use the bathroom, felt dizzy and woke up on the floor. Again, he was unsure how long he was out, but attributed it to the fever, got up and went  back to bed. He says he hasn't told anyone about that episode. He received MAB 2 days ago and states since then his fever has gone away. He is fully vaccinated. Cards: Bernie.    AP - NAD, well appearing, bradycardic, lungs CTA B/L, abd soft, NT/ND, no LE edema, pulses symmetrical bilaterally.    Will check cardiac labs, CXR, EKG, tele monitoring, cardiology consult. Today's episode seems vasovagal, however as this is the second time in a week, will consult cardiology for further work up. Metronidazole Counseling:  I discussed with the patient the risks of metronidazole including but not limited to seizures, nausea/vomiting, a metallic taste in the mouth, nausea/vomiting and severe allergy.

## 2023-12-28 ENCOUNTER — EMERGENCY (EMERGENCY)
Facility: HOSPITAL | Age: 53
LOS: 1 days | Discharge: HOME | End: 2023-12-28
Attending: EMERGENCY MEDICINE
Payer: COMMERCIAL

## 2023-12-28 VITALS
OXYGEN SATURATION: 97 % | RESPIRATION RATE: 20 BRPM | SYSTOLIC BLOOD PRESSURE: 146 MMHG | TEMPERATURE: 100 F | HEART RATE: 88 BPM | DIASTOLIC BLOOD PRESSURE: 92 MMHG | WEIGHT: 127.87 LBS

## 2023-12-28 DIAGNOSIS — Z95.1 PRESENCE OF AORTOCORONARY BYPASS GRAFT: Chronic | ICD-10-CM

## 2023-12-28 LAB
FLUAV AG NPH QL: SIGNIFICANT CHANGE UP
FLUAV AG NPH QL: SIGNIFICANT CHANGE UP
FLUBV AG NPH QL: SIGNIFICANT CHANGE UP
FLUBV AG NPH QL: SIGNIFICANT CHANGE UP
RSV RNA NPH QL NAA+NON-PROBE: SIGNIFICANT CHANGE UP
RSV RNA NPH QL NAA+NON-PROBE: SIGNIFICANT CHANGE UP
SARS-COV-2 RNA SPEC QL NAA+PROBE: SIGNIFICANT CHANGE UP
SARS-COV-2 RNA SPEC QL NAA+PROBE: SIGNIFICANT CHANGE UP

## 2023-12-28 PROCEDURE — 73030 X-RAY EXAM OF SHOULDER: CPT

## 2023-12-28 PROCEDURE — 87637 SARSCOV2&INF A&B&RSV AMP PRB: CPT

## 2023-12-28 PROCEDURE — 99284 EMERGENCY DEPT VISIT MOD MDM: CPT

## 2023-12-28 PROCEDURE — 73030 X-RAY EXAM OF SHOULDER: CPT | Mod: 26,RT

## 2023-12-28 PROCEDURE — 99284 EMERGENCY DEPT VISIT MOD MDM: CPT | Mod: 25

## 2023-12-28 PROCEDURE — 71046 X-RAY EXAM CHEST 2 VIEWS: CPT

## 2023-12-28 PROCEDURE — 71046 X-RAY EXAM CHEST 2 VIEWS: CPT | Mod: 26

## 2023-12-28 NOTE — ED PROVIDER NOTE - PATIENT PORTAL LINK FT
You can access the FollowMyHealth Patient Portal offered by [facility] by registering at the following website: [email or web address]. By joining [facility]’s FollowMyHealth portal, you will also be able to view your health information using other applications (apps) compatible with our system. You can access the FollowMyHealth Patient Portal offered by [facility] by registering at the following website: [email or web address]. By joining [facility]’s FollowMyHealth portal, you will also be able to view your health information using other applications (apps) compatible with our system.

## 2023-12-28 NOTE — ED ADULT NURSE NOTE - OBJECTIVE STATEMENT
Pt presents to ED c/o fever and shoulder pain. Pt tested positive for covid at urgent care last week. Pt awaiting XR.

## 2023-12-28 NOTE — ED PROVIDER NOTE - CLINICAL SUMMARY MEDICAL DECISION MAKING FREE TEXT BOX
54 yo M hx CABG HTN DM on metformin presents c/o fever. Pt reports 10 days ago began having fever cough congestion, seen at  tested +covid. sx improved and had no fevers for at least 3-4 days. fever now returned x 3 days , has HA, body aches. notes R shoulder pain as well although notes shoulder has been hurting him for months, worse w movement. afebrile in ED thraot clear, lungs cta. full ROM shoulder no erythema/warmth. has multiple sick family members. CXR clear no pna, shoulder xr unremarkable. swab sent. likely another viral URI, advised supportive care f/u pcp and return precautions

## 2023-12-28 NOTE — ED PROVIDER NOTE - ATTENDING APP SHARED VISIT CONTRIBUTION OF CARE
I, Walker Paredes, performed a face to face bedside interview with this patient regarding history of present illness, review of symptoms and relevant past medical, social and family history.  I completed an independent physical examination. I have communicated the patient’s plan of care and disposition with the ACP.  53 year old male presents with fever. pt reports that 10 days ago he had fever, cough, congestion, got diagnosed with covid. States he felt better and fever resolved but then Sx returned 3 days ago with renewed congestion and fever.   Gen: NAD, well appearing  CV: RRR  Pul: CTA b/l  Abd: Soft, non-distended, non-tender  Neuro: no focal deficits  Pt improved, cxr clear, well appearing, likely viral uri, stable for dc

## 2023-12-28 NOTE — ED PROVIDER NOTE - OBJECTIVE STATEMENT
54 yo M hx CABG HTN DM on metformin presents c/o fever. Pt reports 10 days ago began having fever cough congestion, seen at  tested +covid. sx improved and had no fevers for at least 3-4 days. fever now returned x 3 days , has HA, body aches. notes R shoulder pain as well although notes shoulder has been hurting him for months, worse w movement. denies n/v/d rashes, denies any current cough/congestion/sore throat, denies dysuria/urgency/frequency, denies abd pain, denies any other complaints at this time. 52 yo M hx CABG HTN DM on metformin presents c/o fever. Pt reports 10 days ago began having fever cough congestion, seen at  tested +covid. sx improved and had no fevers for at least 3-4 days. fever now returned x 3 days , has HA, body aches. notes R shoulder pain as well although notes shoulder has been hurting him for months, worse w movement. denies n/v/d rashes, denies any current cough/congestion/sore throat, denies dysuria/urgency/frequency, denies abd pain, denies any other complaints at this time.

## 2023-12-28 NOTE — ED ADULT NURSE NOTE - TEMPLATE
Called and gave VO per Dr. Crespo for requested PT orders    Damaris Mondragon RN    
[facility] phone call message - order or referral request for patient:     Order or referral being requested: Ongoing PT ONCE A WEEK FOR 4 WEEKS, AND THEN EVERY OTHER WEEK FOR 4 WEEKS       Additional Comments:     OK to leave a message on voice mail? Yes    Primary language: English      needed? No    Call taken on August 3, 2023 at 2:33 PM by Adin Santos    
Yes!  Gayle  
General

## 2023-12-28 NOTE — ED PROVIDER NOTE - NSFOLLOWUPINSTRUCTIONS_ED_ALL_ED_FT
Please take tylenol 650mg every 6hours as needed for pain or fever  Follow up with primary care doctor in 2-3 days  Follow up with orthopedics for further evaluation of shoulder pain  Return to ER for any new or worsening symptoms    Viral Respiratory Infection    A viral respiratory infection is an illness that affects parts of the body used for breathing, like the lungs, nose, and throat. It is caused by a germ called a virus. Symptoms can include runny nose, coughing, sneezing, fatigue, body aches, sore throat, fever, or headache. Over the counter medicine can be used to manage the symptoms but the infection typically goes away on its own in 5 to 10 days.     SEEK IMMEDIATE MEDICAL CARE IF YOU HAVE ANY OF THE FOLLOWING SYMPTOMS: shortness of breath, chest pain, fever over 10 days, or lightheadedness/dizziness.

## 2023-12-28 NOTE — ED PROVIDER NOTE - PHYSICAL EXAMINATION
Gen: No acute distress, non toxic  HEENT: Mucous membranes moist, pink conjunctivae, EOMI. Pharynx clear, no erythema or exudates. No lymphadenopathy.   CV: RRR, nl s1/s2.  Resp: CTAB, normal rate and effort  GI: Abdomen soft, NT, ND. No rebound, no guarding  : No CVAT  Neuro: A&O x 3, moving all 4 extremities  MSK: Full ROM R shoulder no erythema/warmth, 2+ distal pulses. No spine or joint tenderness to palpation  Skin: No rashes. intact and perfused.

## 2023-12-28 NOTE — ED PROVIDER NOTE - CARE PROVIDER_API CALL
Amy Maradiaga  Orthopaedic Surgery  [address] [place] [address]  Phone: [phone]  Fax: [phone]  Follow Up Time:    Amy Maradiaga  Orthopaedic Surgery  [address] [place] [address]  Phone: [phone]  Fax: [phone]  Follow Up Time:

## 2023-12-28 NOTE — ED ADULT NURSE NOTE - NSFALLUNIVINTERV_ED_ALL_ED
Bed/Stretcher in lowest position, wheels locked, appropriate side rails in place/Call bell, personal items and telephone in reach/Instruct patient to call for assistance before getting out of bed/chair/stretcher/Non-slip footwear applied when patient is off stretcher/[place] to call system/Physically safe environment - no spills, clutter or unnecessary equipment/Purposeful proactive rounding/Room/bathroom lighting operational, light cord in reach Bed/Stretcher in lowest position, wheels locked, appropriate side rails in place/Call bell, personal items and telephone in reach/Instruct patient to call for assistance before getting out of bed/chair/stretcher/Non-slip footwear applied when patient is off stretcher/[place] to call system/Physically safe environment - no spills, clutter or unnecessary equipment/Purposeful proactive rounding/Room/bathroom lighting operational, light cord in reach

## 2023-12-28 NOTE — ED ADULT NURSE NOTE - CHIEF COMPLAINT QUOTE
From home c/o fever & shoulder pain for 4 days now, Last week was tested positive for Covid by [facility], yesterday patient said that he did an home test result is Negative From home c/o fever & shoulder pain for 4 days now, Last week was tested positive for Covid by [facility], yesterday patient said that he did an home test result is Negative